# Patient Record
Sex: MALE | Race: WHITE | Employment: FULL TIME | URBAN - METROPOLITAN AREA
[De-identification: names, ages, dates, MRNs, and addresses within clinical notes are randomized per-mention and may not be internally consistent; named-entity substitution may affect disease eponyms.]

---

## 2017-01-04 ENCOUNTER — TELEPHONE (OUTPATIENT)
Dept: INTERNAL MEDICINE CLINIC | Age: 47
End: 2017-01-04

## 2017-01-04 ENCOUNTER — OFFICE VISIT (OUTPATIENT)
Dept: INTERNAL MEDICINE CLINIC | Age: 47
End: 2017-01-04

## 2017-01-04 VITALS
DIASTOLIC BLOOD PRESSURE: 88 MMHG | HEART RATE: 84 BPM | WEIGHT: 259 LBS | SYSTOLIC BLOOD PRESSURE: 138 MMHG | TEMPERATURE: 98.2 F | BODY MASS INDEX: 35.13 KG/M2

## 2017-01-04 DIAGNOSIS — J06.9 UPPER RESPIRATORY TRACT INFECTION, UNSPECIFIED TYPE: Primary | ICD-10-CM

## 2017-01-04 PROCEDURE — 99213 OFFICE O/P EST LOW 20 MIN: CPT | Performed by: NURSE PRACTITIONER

## 2017-01-04 RX ORDER — AZITHROMYCIN 250 MG/1
TABLET, FILM COATED ORAL
Qty: 6 TABLET | Refills: 0 | Status: SHIPPED | OUTPATIENT
Start: 2017-01-04 | End: 2017-01-10 | Stop reason: ALTCHOICE

## 2017-01-04 RX ORDER — BENZONATATE 100 MG/1
200 CAPSULE ORAL 3 TIMES DAILY PRN
Qty: 30 CAPSULE | Refills: 0 | Status: SHIPPED | OUTPATIENT
Start: 2017-01-04 | End: 2017-01-10 | Stop reason: ALTCHOICE

## 2017-01-04 RX ORDER — CODEINE PHOSPHATE AND GUAIFENESIN 10; 100 MG/5ML; MG/5ML
5 SOLUTION ORAL NIGHTLY PRN
Qty: 420 ML | Refills: 0 | Status: SHIPPED | OUTPATIENT
Start: 2017-01-04 | End: 2017-01-10 | Stop reason: ALTCHOICE

## 2017-01-10 ENCOUNTER — OFFICE VISIT (OUTPATIENT)
Dept: INTERNAL MEDICINE CLINIC | Age: 47
End: 2017-01-10

## 2017-01-10 VITALS
SYSTOLIC BLOOD PRESSURE: 120 MMHG | WEIGHT: 259 LBS | HEIGHT: 72 IN | DIASTOLIC BLOOD PRESSURE: 80 MMHG | BODY MASS INDEX: 35.08 KG/M2 | HEART RATE: 80 BPM | RESPIRATION RATE: 16 BRPM

## 2017-01-10 DIAGNOSIS — E11.9 TYPE 2 DIABETES MELLITUS WITHOUT COMPLICATION, WITHOUT LONG-TERM CURRENT USE OF INSULIN (HCC): Primary | ICD-10-CM

## 2017-01-10 DIAGNOSIS — N40.1 BENIGN NON-NODULAR PROSTATIC HYPERPLASIA WITH LOWER URINARY TRACT SYMPTOMS: ICD-10-CM

## 2017-01-10 DIAGNOSIS — M1A.0720 CHRONIC IDIOPATHIC GOUT INVOLVING TOE OF LEFT FOOT WITHOUT TOPHUS: ICD-10-CM

## 2017-01-10 DIAGNOSIS — I10 BENIGN ESSENTIAL HTN: ICD-10-CM

## 2017-01-10 DIAGNOSIS — E78.5 DYSLIPIDEMIA: ICD-10-CM

## 2017-01-10 DIAGNOSIS — N52.9 ERECTILE DYSFUNCTION, UNSPECIFIED ERECTILE DYSFUNCTION TYPE: ICD-10-CM

## 2017-01-10 LAB
A/G RATIO: 1.4 (ref 1.1–2.2)
ALBUMIN SERPL-MCNC: 4.6 G/DL (ref 3.4–5)
ALP BLD-CCNC: 70 U/L (ref 40–129)
ALT SERPL-CCNC: 55 U/L (ref 10–40)
ANION GAP SERPL CALCULATED.3IONS-SCNC: 19 MMOL/L (ref 3–16)
AST SERPL-CCNC: 43 U/L (ref 15–37)
BILIRUB SERPL-MCNC: 0.4 MG/DL (ref 0–1)
BUN BLDV-MCNC: 13 MG/DL (ref 7–20)
CALCIUM SERPL-MCNC: 10.2 MG/DL (ref 8.3–10.6)
CHLORIDE BLD-SCNC: 96 MMOL/L (ref 99–110)
CHOLESTEROL, TOTAL: 119 MG/DL (ref 0–199)
CO2: 25 MMOL/L (ref 21–32)
CREAT SERPL-MCNC: 0.8 MG/DL (ref 0.9–1.3)
GFR AFRICAN AMERICAN: >60
GFR NON-AFRICAN AMERICAN: >60
GLOBULIN: 3.4 G/DL
GLUCOSE BLD-MCNC: 176 MG/DL (ref 70–99)
HDLC SERPL-MCNC: 27 MG/DL (ref 40–60)
LDL CHOLESTEROL CALCULATED: 54 MG/DL
POTASSIUM SERPL-SCNC: 4.7 MMOL/L (ref 3.5–5.1)
SODIUM BLD-SCNC: 140 MMOL/L (ref 136–145)
TOTAL PROTEIN: 8 G/DL (ref 6.4–8.2)
TRIGL SERPL-MCNC: 191 MG/DL (ref 0–150)
TSH SERPL DL<=0.05 MIU/L-ACNC: 2.38 UIU/ML (ref 0.27–4.2)
URIC ACID, SERUM: 6.5 MG/DL (ref 3.5–7.2)
VLDLC SERPL CALC-MCNC: 38 MG/DL

## 2017-01-10 PROCEDURE — 99214 OFFICE O/P EST MOD 30 MIN: CPT | Performed by: INTERNAL MEDICINE

## 2017-01-10 RX ORDER — TAMSULOSIN HYDROCHLORIDE 0.4 MG/1
0.4 CAPSULE ORAL DAILY
Qty: 30 CAPSULE | Refills: 3 | Status: SHIPPED | OUTPATIENT
Start: 2017-01-10 | End: 2017-05-30 | Stop reason: ALTCHOICE

## 2017-01-10 RX ORDER — SILDENAFIL 100 MG/1
50 TABLET, FILM COATED ORAL PRN
Qty: 4 TABLET | Refills: 0 | COMMUNITY
Start: 2017-01-10 | End: 2018-06-13 | Stop reason: ALTCHOICE

## 2017-01-11 LAB
ESTIMATED AVERAGE GLUCOSE: 154.2 MG/DL
HBA1C MFR BLD: 7 %

## 2017-03-13 RX ORDER — LISINOPRIL 5 MG/1
TABLET ORAL
Qty: 90 TABLET | Refills: 3 | Status: SHIPPED | OUTPATIENT
Start: 2017-03-13 | End: 2017-04-04 | Stop reason: SDUPTHER

## 2017-03-14 ENCOUNTER — OFFICE VISIT (OUTPATIENT)
Dept: ORTHOPEDIC SURGERY | Age: 47
End: 2017-03-14

## 2017-03-14 VITALS — BODY MASS INDEX: 35.09 KG/M2 | WEIGHT: 259.04 LBS | HEIGHT: 72 IN

## 2017-03-14 DIAGNOSIS — M25.562 CHRONIC PAIN OF BOTH KNEES: ICD-10-CM

## 2017-03-14 DIAGNOSIS — M22.42 CHONDROMALACIA OF BOTH PATELLAE: Primary | ICD-10-CM

## 2017-03-14 DIAGNOSIS — M25.561 CHRONIC PAIN OF BOTH KNEES: ICD-10-CM

## 2017-03-14 DIAGNOSIS — G89.29 CHRONIC PAIN OF BOTH KNEES: ICD-10-CM

## 2017-03-14 DIAGNOSIS — M22.41 CHONDROMALACIA OF BOTH PATELLAE: Primary | ICD-10-CM

## 2017-03-14 DIAGNOSIS — M17.10 LOCALIZED OSTEOARTHROSIS, LOWER LEG: ICD-10-CM

## 2017-03-14 PROCEDURE — 99214 OFFICE O/P EST MOD 30 MIN: CPT | Performed by: FAMILY MEDICINE

## 2017-03-14 PROCEDURE — 73564 X-RAY EXAM KNEE 4 OR MORE: CPT | Performed by: FAMILY MEDICINE

## 2017-03-20 ENCOUNTER — TELEPHONE (OUTPATIENT)
Dept: INTERNAL MEDICINE CLINIC | Age: 47
End: 2017-03-20

## 2017-03-21 ENCOUNTER — OFFICE VISIT (OUTPATIENT)
Dept: ORTHOPEDIC SURGERY | Age: 47
End: 2017-03-21

## 2017-03-21 VITALS — BODY MASS INDEX: 35.09 KG/M2 | HEIGHT: 72 IN | WEIGHT: 259.04 LBS

## 2017-03-21 DIAGNOSIS — M22.42 CHONDROMALACIA OF BOTH PATELLAE: Primary | ICD-10-CM

## 2017-03-21 DIAGNOSIS — M25.561 CHRONIC PAIN OF BOTH KNEES: ICD-10-CM

## 2017-03-21 DIAGNOSIS — G89.29 CHRONIC PAIN OF BOTH KNEES: ICD-10-CM

## 2017-03-21 DIAGNOSIS — M17.10 LOCALIZED OSTEOARTHROSIS, LOWER LEG: ICD-10-CM

## 2017-03-21 DIAGNOSIS — M25.562 CHRONIC PAIN OF BOTH KNEES: ICD-10-CM

## 2017-03-21 DIAGNOSIS — M22.41 CHONDROMALACIA OF BOTH PATELLAE: Primary | ICD-10-CM

## 2017-03-21 PROCEDURE — 99213 OFFICE O/P EST LOW 20 MIN: CPT | Performed by: FAMILY MEDICINE

## 2017-03-21 PROCEDURE — 20610 DRAIN/INJ JOINT/BURSA W/O US: CPT | Performed by: FAMILY MEDICINE

## 2017-03-21 RX ORDER — ALLOPURINOL 100 MG/1
TABLET ORAL
Qty: 180 TABLET | Refills: 2 | Status: SHIPPED | OUTPATIENT
Start: 2017-03-21 | End: 2018-05-21 | Stop reason: SDUPTHER

## 2017-03-28 ENCOUNTER — OFFICE VISIT (OUTPATIENT)
Dept: ORTHOPEDIC SURGERY | Age: 47
End: 2017-03-28

## 2017-03-28 VITALS — BODY MASS INDEX: 35.09 KG/M2 | HEIGHT: 72 IN | WEIGHT: 259.04 LBS

## 2017-03-28 DIAGNOSIS — G89.29 CHRONIC PAIN OF BOTH KNEES: Primary | ICD-10-CM

## 2017-03-28 DIAGNOSIS — M25.562 CHRONIC PAIN OF BOTH KNEES: Primary | ICD-10-CM

## 2017-03-28 DIAGNOSIS — M25.561 CHRONIC PAIN OF BOTH KNEES: Primary | ICD-10-CM

## 2017-03-28 DIAGNOSIS — M22.42 CHONDROMALACIA OF BOTH PATELLAE: ICD-10-CM

## 2017-03-28 DIAGNOSIS — M22.41 CHONDROMALACIA OF BOTH PATELLAE: ICD-10-CM

## 2017-03-28 PROCEDURE — 20610 DRAIN/INJ JOINT/BURSA W/O US: CPT | Performed by: FAMILY MEDICINE

## 2017-03-28 PROCEDURE — 99213 OFFICE O/P EST LOW 20 MIN: CPT | Performed by: FAMILY MEDICINE

## 2017-04-03 ENCOUNTER — TELEPHONE (OUTPATIENT)
Dept: INTERNAL MEDICINE CLINIC | Age: 47
End: 2017-04-03

## 2017-04-04 ENCOUNTER — HOSPITAL ENCOUNTER (OUTPATIENT)
Dept: OTHER | Age: 47
Discharge: HOME | End: 2017-04-04
Attending: INTERNAL MEDICINE | Admitting: INTERNAL MEDICINE

## 2017-04-04 ENCOUNTER — OFFICE VISIT (OUTPATIENT)
Dept: INTERNAL MEDICINE CLINIC | Age: 47
End: 2017-04-04

## 2017-04-04 VITALS
DIASTOLIC BLOOD PRESSURE: 84 MMHG | WEIGHT: 250 LBS | SYSTOLIC BLOOD PRESSURE: 120 MMHG | BODY MASS INDEX: 33.86 KG/M2 | RESPIRATION RATE: 16 BRPM | TEMPERATURE: 98.3 F

## 2017-04-04 DIAGNOSIS — J40 BRONCHITIS: ICD-10-CM

## 2017-04-04 DIAGNOSIS — J40 BRONCHITIS: Primary | ICD-10-CM

## 2017-04-04 PROCEDURE — 99213 OFFICE O/P EST LOW 20 MIN: CPT | Performed by: INTERNAL MEDICINE

## 2017-04-04 RX ORDER — GUAIFENESIN AND DEXTROMETHORPHAN HYDROBROMIDE 600; 30 MG/1; MG/1
1 TABLET, EXTENDED RELEASE ORAL 2 TIMES DAILY
Qty: 28 TABLET | Refills: 0 | COMMUNITY
Start: 2017-04-04 | End: 2017-05-30 | Stop reason: ALTCHOICE

## 2017-04-04 RX ORDER — PROMETHAZINE HYDROCHLORIDE AND CODEINE PHOSPHATE 6.25; 1 MG/5ML; MG/5ML
5 SYRUP ORAL 4 TIMES DAILY PRN
Qty: 120 ML | Refills: 0 | Status: SHIPPED | OUTPATIENT
Start: 2017-04-04 | End: 2017-04-10

## 2017-04-04 RX ORDER — ALBUTEROL SULFATE 90 UG/1
2 AEROSOL, METERED RESPIRATORY (INHALATION) EVERY 6 HOURS PRN
Qty: 1 INHALER | Refills: 3 | Status: SHIPPED | OUTPATIENT
Start: 2017-04-04 | End: 2018-06-13 | Stop reason: ALTCHOICE

## 2017-04-04 RX ORDER — LEVOFLOXACIN 500 MG/1
500 TABLET, FILM COATED ORAL DAILY
Qty: 7 TABLET | Refills: 0 | Status: SHIPPED | OUTPATIENT
Start: 2017-04-04 | End: 2017-04-11

## 2017-04-10 ENCOUNTER — TELEPHONE (OUTPATIENT)
Dept: INTERNAL MEDICINE CLINIC | Age: 47
End: 2017-04-10

## 2017-04-11 ENCOUNTER — OFFICE VISIT (OUTPATIENT)
Dept: ORTHOPEDIC SURGERY | Age: 47
End: 2017-04-11

## 2017-04-11 VITALS — WEIGHT: 250 LBS | BODY MASS INDEX: 33.86 KG/M2 | HEIGHT: 72 IN

## 2017-04-11 DIAGNOSIS — M22.41 CHONDROMALACIA OF BOTH PATELLAE: Primary | ICD-10-CM

## 2017-04-11 DIAGNOSIS — M22.42 CHONDROMALACIA OF BOTH PATELLAE: Primary | ICD-10-CM

## 2017-04-11 PROCEDURE — 20610 DRAIN/INJ JOINT/BURSA W/O US: CPT | Performed by: FAMILY MEDICINE

## 2017-04-18 ENCOUNTER — OFFICE VISIT (OUTPATIENT)
Dept: ORTHOPEDIC SURGERY | Age: 47
End: 2017-04-18

## 2017-04-18 VITALS — HEIGHT: 72 IN | WEIGHT: 250 LBS | BODY MASS INDEX: 33.86 KG/M2

## 2017-04-18 DIAGNOSIS — M22.41 CHONDROMALACIA OF BOTH PATELLAE: Primary | ICD-10-CM

## 2017-04-18 DIAGNOSIS — M25.561 CHRONIC PAIN OF BOTH KNEES: ICD-10-CM

## 2017-04-18 DIAGNOSIS — M22.42 CHONDROMALACIA OF BOTH PATELLAE: Primary | ICD-10-CM

## 2017-04-18 DIAGNOSIS — G89.29 CHRONIC PAIN OF BOTH KNEES: ICD-10-CM

## 2017-04-18 DIAGNOSIS — M25.562 CHRONIC PAIN OF BOTH KNEES: ICD-10-CM

## 2017-04-18 PROCEDURE — 20610 DRAIN/INJ JOINT/BURSA W/O US: CPT | Performed by: FAMILY MEDICINE

## 2017-04-19 ENCOUNTER — TELEPHONE (OUTPATIENT)
Dept: ORTHOPEDIC SURGERY | Age: 47
End: 2017-04-19

## 2017-05-30 ENCOUNTER — OFFICE VISIT (OUTPATIENT)
Dept: INTERNAL MEDICINE CLINIC | Age: 47
End: 2017-05-30

## 2017-05-30 VITALS
DIASTOLIC BLOOD PRESSURE: 83 MMHG | SYSTOLIC BLOOD PRESSURE: 144 MMHG | OXYGEN SATURATION: 97 % | BODY MASS INDEX: 33.86 KG/M2 | HEART RATE: 77 BPM | WEIGHT: 250 LBS

## 2017-05-30 DIAGNOSIS — R11.2 NON-INTRACTABLE VOMITING WITH NAUSEA, UNSPECIFIED VOMITING TYPE: ICD-10-CM

## 2017-05-30 DIAGNOSIS — R19.7 DIARRHEA, UNSPECIFIED TYPE: ICD-10-CM

## 2017-05-30 DIAGNOSIS — R10.10 UPPER ABDOMINAL PAIN: Primary | ICD-10-CM

## 2017-05-30 DIAGNOSIS — R50.9 ELEVATED TEMPERATURE: ICD-10-CM

## 2017-05-30 LAB
A/G RATIO: 1.2 (ref 1.1–2.2)
ALBUMIN SERPL-MCNC: 4.3 G/DL (ref 3.4–5)
ALP BLD-CCNC: 57 U/L (ref 40–129)
ALT SERPL-CCNC: 31 U/L (ref 10–40)
AMYLASE: 42 U/L (ref 25–115)
ANION GAP SERPL CALCULATED.3IONS-SCNC: 19 MMOL/L (ref 3–16)
AST SERPL-CCNC: 22 U/L (ref 15–37)
BASOPHILS ABSOLUTE: 0.1 K/UL (ref 0–0.2)
BASOPHILS RELATIVE PERCENT: 0.4 %
BILIRUB SERPL-MCNC: 0.6 MG/DL (ref 0–1)
BUN BLDV-MCNC: 18 MG/DL (ref 7–20)
CALCIUM SERPL-MCNC: 9.3 MG/DL (ref 8.3–10.6)
CHLORIDE BLD-SCNC: 98 MMOL/L (ref 99–110)
CO2: 24 MMOL/L (ref 21–32)
CREAT SERPL-MCNC: 0.9 MG/DL (ref 0.9–1.3)
EOSINOPHILS ABSOLUTE: 0.8 K/UL (ref 0–0.6)
EOSINOPHILS RELATIVE PERCENT: 4.2 %
GFR AFRICAN AMERICAN: >60
GFR NON-AFRICAN AMERICAN: >60
GLOBULIN: 3.5 G/DL
GLUCOSE BLD-MCNC: 106 MG/DL (ref 70–99)
HCT VFR BLD CALC: 51.3 % (ref 40.5–52.5)
HEMOGLOBIN: 16.7 G/DL (ref 13.5–17.5)
LIPASE: 18 U/L (ref 13–60)
LYMPHOCYTES ABSOLUTE: 4.3 K/UL (ref 1–5.1)
LYMPHOCYTES RELATIVE PERCENT: 23.8 %
MCH RBC QN AUTO: 30.7 PG (ref 26–34)
MCHC RBC AUTO-ENTMCNC: 32.6 G/DL (ref 31–36)
MCV RBC AUTO: 94.2 FL (ref 80–100)
MONOCYTES ABSOLUTE: 0.8 K/UL (ref 0–1.3)
MONOCYTES RELATIVE PERCENT: 4.2 %
NEUTROPHILS ABSOLUTE: 12.2 K/UL (ref 1.7–7.7)
NEUTROPHILS RELATIVE PERCENT: 67.4 %
PDW BLD-RTO: 13.9 % (ref 12.4–15.4)
PLATELET # BLD: 308 K/UL (ref 135–450)
PMV BLD AUTO: 10 FL (ref 5–10.5)
POTASSIUM SERPL-SCNC: 4.7 MMOL/L (ref 3.5–5.1)
RBC # BLD: 5.45 M/UL (ref 4.2–5.9)
SODIUM BLD-SCNC: 141 MMOL/L (ref 136–145)
TOTAL PROTEIN: 7.8 G/DL (ref 6.4–8.2)
WBC # BLD: 18.1 K/UL (ref 4–11)

## 2017-05-30 PROCEDURE — 99213 OFFICE O/P EST LOW 20 MIN: CPT | Performed by: INTERNAL MEDICINE

## 2017-05-30 RX ORDER — ONDANSETRON 4 MG/1
4 TABLET, FILM COATED ORAL EVERY 6 HOURS PRN
Qty: 30 TABLET | Refills: 0 | Status: SHIPPED | OUTPATIENT
Start: 2017-05-30 | End: 2017-10-20 | Stop reason: ALTCHOICE

## 2017-05-30 RX ORDER — LOPERAMIDE HYDROCHLORIDE 2 MG/1
2 CAPSULE ORAL 4 TIMES DAILY PRN
COMMUNITY
Start: 2017-05-30 | End: 2017-10-20 | Stop reason: ALTCHOICE

## 2017-05-31 DIAGNOSIS — A09 DIARRHEA OF INFECTIOUS ORIGIN: Primary | ICD-10-CM

## 2017-06-01 ENCOUNTER — HOSPITAL ENCOUNTER (OUTPATIENT)
Dept: ULTRASOUND IMAGING | Age: 47
Discharge: HOME | End: 2017-06-01
Attending: INTERNAL MEDICINE | Admitting: INTERNAL MEDICINE

## 2017-06-01 DIAGNOSIS — R10.10 UPPER ABDOMINAL PAIN: ICD-10-CM

## 2017-06-01 DIAGNOSIS — R11.2 NON-INTRACTABLE VOMITING WITH NAUSEA, UNSPECIFIED VOMITING TYPE: ICD-10-CM

## 2017-06-02 ENCOUNTER — TELEPHONE (OUTPATIENT)
Dept: INTERNAL MEDICINE CLINIC | Age: 47
End: 2017-06-02

## 2017-06-02 DIAGNOSIS — R10.10 UPPER ABDOMINAL PAIN: Primary | ICD-10-CM

## 2017-06-02 DIAGNOSIS — R14.0 ABDOMINAL BLOATING: ICD-10-CM

## 2017-06-05 ENCOUNTER — HOSPITAL ENCOUNTER (OUTPATIENT)
Dept: OTHER | Age: 47
Discharge: HOME | End: 2017-06-05
Attending: INTERNAL MEDICINE | Admitting: INTERNAL MEDICINE

## 2017-06-05 ENCOUNTER — TELEPHONE (OUTPATIENT)
Dept: INTERNAL MEDICINE CLINIC | Age: 47
End: 2017-06-05

## 2017-06-05 LAB — OCCULT BLOOD SCREENING: NORMAL

## 2017-06-07 ENCOUNTER — TELEPHONE (OUTPATIENT)
Dept: INTERNAL MEDICINE CLINIC | Age: 47
End: 2017-06-07

## 2017-06-19 ENCOUNTER — OFFICE VISIT (OUTPATIENT)
Dept: INTERNAL MEDICINE CLINIC | Age: 47
End: 2017-06-19

## 2017-06-19 VITALS — DIASTOLIC BLOOD PRESSURE: 82 MMHG | SYSTOLIC BLOOD PRESSURE: 130 MMHG

## 2017-06-19 DIAGNOSIS — M54.41 ACUTE RIGHT-SIDED LOW BACK PAIN WITH RIGHT-SIDED SCIATICA: Primary | ICD-10-CM

## 2017-06-19 DIAGNOSIS — R35.0 URINARY FREQUENCY: ICD-10-CM

## 2017-06-19 DIAGNOSIS — N40.1 BENIGN NON-NODULAR PROSTATIC HYPERPLASIA WITH LOWER URINARY TRACT SYMPTOMS: ICD-10-CM

## 2017-06-19 PROCEDURE — 99212 OFFICE O/P EST SF 10 MIN: CPT | Performed by: INTERNAL MEDICINE

## 2017-06-19 RX ORDER — DICYCLOMINE HCL 20 MG
20 TABLET ORAL 3 TIMES DAILY
COMMUNITY
End: 2018-06-13 | Stop reason: ALTCHOICE

## 2017-08-07 RX ORDER — SITAGLIPTIN AND METFORMIN HYDROCHLORIDE 1000; 50 MG/1; MG/1
TABLET, FILM COATED ORAL
Qty: 180 TABLET | Refills: 2 | Status: SHIPPED | OUTPATIENT
Start: 2017-08-07 | End: 2017-09-12 | Stop reason: SDUPTHER

## 2017-09-12 ENCOUNTER — OFFICE VISIT (OUTPATIENT)
Dept: ORTHOPEDIC SURGERY | Age: 47
End: 2017-09-12

## 2017-09-12 VITALS
DIASTOLIC BLOOD PRESSURE: 81 MMHG | SYSTOLIC BLOOD PRESSURE: 130 MMHG | BODY MASS INDEX: 33.86 KG/M2 | HEART RATE: 71 BPM | WEIGHT: 250 LBS | HEIGHT: 72 IN

## 2017-09-12 DIAGNOSIS — M22.42 CHONDROMALACIA OF BOTH PATELLAE: Primary | ICD-10-CM

## 2017-09-12 DIAGNOSIS — M25.562 CHRONIC PAIN OF BOTH KNEES: ICD-10-CM

## 2017-09-12 DIAGNOSIS — M25.561 CHRONIC PAIN OF BOTH KNEES: ICD-10-CM

## 2017-09-12 DIAGNOSIS — G89.29 CHRONIC PAIN OF BOTH KNEES: ICD-10-CM

## 2017-09-12 DIAGNOSIS — S83.282A ACUTE LATERAL MENISCUS TEAR OF LEFT KNEE, INITIAL ENCOUNTER: ICD-10-CM

## 2017-09-12 DIAGNOSIS — M22.41 CHONDROMALACIA OF BOTH PATELLAE: Primary | ICD-10-CM

## 2017-09-12 PROCEDURE — 99213 OFFICE O/P EST LOW 20 MIN: CPT | Performed by: FAMILY MEDICINE

## 2017-09-12 PROCEDURE — 20610 DRAIN/INJ JOINT/BURSA W/O US: CPT | Performed by: FAMILY MEDICINE

## 2017-09-18 RX ORDER — PRAVASTATIN SODIUM 80 MG/1
TABLET ORAL
Qty: 90 TABLET | Refills: 3 | Status: SHIPPED | OUTPATIENT
Start: 2017-09-18 | End: 2018-10-29 | Stop reason: SDUPTHER

## 2017-09-20 ENCOUNTER — TELEPHONE (OUTPATIENT)
Dept: ORTHOPEDIC SURGERY | Age: 47
End: 2017-09-20

## 2017-10-20 ENCOUNTER — OFFICE VISIT (OUTPATIENT)
Dept: INTERNAL MEDICINE CLINIC | Age: 47
End: 2017-10-20

## 2017-10-20 VITALS
SYSTOLIC BLOOD PRESSURE: 126 MMHG | DIASTOLIC BLOOD PRESSURE: 73 MMHG | WEIGHT: 255 LBS | RESPIRATION RATE: 12 BRPM | BODY MASS INDEX: 34.54 KG/M2 | HEART RATE: 85 BPM | HEIGHT: 72 IN

## 2017-10-20 DIAGNOSIS — E78.5 DYSLIPIDEMIA: ICD-10-CM

## 2017-10-20 DIAGNOSIS — E11.9 TYPE 2 DIABETES MELLITUS WITHOUT COMPLICATION, WITHOUT LONG-TERM CURRENT USE OF INSULIN (HCC): Primary | ICD-10-CM

## 2017-10-20 DIAGNOSIS — I10 BENIGN ESSENTIAL HTN: ICD-10-CM

## 2017-10-20 DIAGNOSIS — M1A.0720 CHRONIC IDIOPATHIC GOUT INVOLVING TOE OF LEFT FOOT WITHOUT TOPHUS: ICD-10-CM

## 2017-10-20 DIAGNOSIS — E66.9 OBESITY (BMI 30-39.9): ICD-10-CM

## 2017-10-20 DIAGNOSIS — Z23 NEED FOR INFLUENZA VACCINATION: ICD-10-CM

## 2017-10-20 DIAGNOSIS — K75.81 NASH (NONALCOHOLIC STEATOHEPATITIS): ICD-10-CM

## 2017-10-20 LAB
A/G RATIO: 1.3 (ref 1.1–2.2)
ALBUMIN SERPL-MCNC: 4.5 G/DL (ref 3.4–5)
ALP BLD-CCNC: 73 U/L (ref 40–129)
ALT SERPL-CCNC: 74 U/L (ref 10–40)
ANION GAP SERPL CALCULATED.3IONS-SCNC: 17 MMOL/L (ref 3–16)
AST SERPL-CCNC: 62 U/L (ref 15–37)
BASOPHILS ABSOLUTE: 0.1 K/UL (ref 0–0.2)
BASOPHILS RELATIVE PERCENT: 0.6 %
BILIRUB SERPL-MCNC: 0.5 MG/DL (ref 0–1)
BUN BLDV-MCNC: 13 MG/DL (ref 7–20)
CALCIUM SERPL-MCNC: 9.9 MG/DL (ref 8.3–10.6)
CHLORIDE BLD-SCNC: 96 MMOL/L (ref 99–110)
CHOLESTEROL, TOTAL: 140 MG/DL (ref 0–199)
CO2: 27 MMOL/L (ref 21–32)
CREAT SERPL-MCNC: 0.9 MG/DL (ref 0.9–1.3)
CREATININE URINE: 139.8 MG/DL (ref 39–259)
EOSINOPHILS ABSOLUTE: 0.1 K/UL (ref 0–0.6)
EOSINOPHILS RELATIVE PERCENT: 1 %
GFR AFRICAN AMERICAN: >60
GFR NON-AFRICAN AMERICAN: >60
GLOBULIN: 3.4 G/DL
GLUCOSE BLD-MCNC: 181 MG/DL (ref 70–99)
HCT VFR BLD CALC: 44.6 % (ref 40.5–52.5)
HDLC SERPL-MCNC: 28 MG/DL (ref 40–60)
HEMOGLOBIN: 15.1 G/DL (ref 13.5–17.5)
LDL CHOLESTEROL CALCULATED: 67 MG/DL
LYMPHOCYTES ABSOLUTE: 3.4 K/UL (ref 1–5.1)
LYMPHOCYTES RELATIVE PERCENT: 29.5 %
MCH RBC QN AUTO: 31 PG (ref 26–34)
MCHC RBC AUTO-ENTMCNC: 33.8 G/DL (ref 31–36)
MCV RBC AUTO: 91.8 FL (ref 80–100)
MICROALBUMIN UR-MCNC: <1.2 MG/DL
MICROALBUMIN/CREAT UR-RTO: NORMAL MG/G (ref 0–30)
MONOCYTES ABSOLUTE: 0.6 K/UL (ref 0–1.3)
MONOCYTES RELATIVE PERCENT: 5.3 %
NEUTROPHILS ABSOLUTE: 7.3 K/UL (ref 1.7–7.7)
NEUTROPHILS RELATIVE PERCENT: 63.6 %
PDW BLD-RTO: 13.4 % (ref 12.4–15.4)
PLATELET # BLD: 230 K/UL (ref 135–450)
PMV BLD AUTO: 10.3 FL (ref 5–10.5)
POTASSIUM SERPL-SCNC: 4.4 MMOL/L (ref 3.5–5.1)
RBC # BLD: 4.86 M/UL (ref 4.2–5.9)
SODIUM BLD-SCNC: 140 MMOL/L (ref 136–145)
TOTAL PROTEIN: 7.9 G/DL (ref 6.4–8.2)
TRIGL SERPL-MCNC: 226 MG/DL (ref 0–150)
TSH SERPL DL<=0.05 MIU/L-ACNC: 2.41 UIU/ML (ref 0.27–4.2)
URIC ACID, SERUM: 6 MG/DL (ref 3.5–7.2)
VLDLC SERPL CALC-MCNC: 45 MG/DL
WBC # BLD: 11.5 K/UL (ref 4–11)

## 2017-10-20 PROCEDURE — 90471 IMMUNIZATION ADMIN: CPT | Performed by: INTERNAL MEDICINE

## 2017-10-20 PROCEDURE — 99214 OFFICE O/P EST MOD 30 MIN: CPT | Performed by: INTERNAL MEDICINE

## 2017-10-20 PROCEDURE — 90686 IIV4 VACC NO PRSV 0.5 ML IM: CPT | Performed by: INTERNAL MEDICINE

## 2017-10-20 NOTE — PROGRESS NOTES
[address] Internal Medicine  Patient Encounter  Dr. Stephan Hodgkins      Chief Complaint   Patient presents with    Check-Up    Medication Check    Diabetes         HPI: 50 y.o. male with multiple medical problems seen today for follow up regarding the status of those issues listed below along with a medication review. Patient has not had a checkup since January 2017. He is way overdue for review of these medical problems and lab work. He has since had a colonoscopy and EGD. He has seen Dr. Fide Valdez.  He addressed fatty liver. Pt is to enroll in a study for fatty liver. He never had a liver elastography. A drug for fatty liver is being investigated. He is no longer having N/V/D, abd pain. He has an appointment 11/7. Patient is not sure he wants to participate in the study. He is more concerned about what causes his intermittent nausea, vomiting, diarrhea, abdominal pain. In the absence of that any other etiology autonomic neuropathy comes to mind. Diabetes Mellitus Type II, Follow-up--   Lab Results   Component Value Date    LABA1C 7.0 01/10/2017        Lab Results   Component Value Date    .2 01/10/2017     He has been feeling well. Home sugars have been running--100-120?? He has slacked off checking. He has been focusing on low carb. He has not been exercising outside of work. He is taking the Janumet. The Glipizide was stopped. Last Eye Exam: Overdue. No new vision changes  U. Microalbumin/Cr: 10/10/2016, overdue  + ACEi-- Lisinopril-- No cough  Complications-- None. No new dysesthesias in feet. Insulin Treated? No.  + ASA  + Statin  Foot examOverdue    Hyperlipidemia:    Lab Results   Component Value Date    LDLCALC 54 01/10/2017     He has been taking the Pravastatin. He denies new development of myalgias, weakness, or cramps. He has not been adherent to LF diet. HTN-- He denies new concerns with regards to his BP. He denies cough from ACEi.   He denies lightheadedness, Urine Ratio  - Comprehensive Metabolic Panel  - Hemoglobin A1C  - Lipid Panel  - CBC Auto Differential  - TSH without Reflex    2. Benign essential HTN  Condition is well controlled  Continue current medication  Continue no added salt diet and exercise. Continue to observe for hypertensive symptoms  - Comprehensive Metabolic Panel  - Lipid Panel  - CBC Auto Differential  - TSH without Reflex    3. Chronic idiopathic gout involving toe of left foot without tophus  Condition is well controlled  Continue purine-restricted diet. Patient counseled  - Uric Acid    4. Dyslipidemia  Condition stability is uncertain. Due for lab  Continue pravastatin 80 mg daily  Continue low-fat diet as well  - Comprehensive Metabolic Panel  - Lipid Panel    5. KOEHLER (nonalcoholic steatohepatitis)  Condition stability is uncertain. He's not had a liver biopsy in a couple of years. Patient still needs to have his liver elastography. He is contemplating entering a study for fatty liver  Lifestyle modification discussed. Patient was counseled on how critically important this aspect of his care is  - Comprehensive Metabolic Panel    6. Obesity (BMI 30-39. 9)  As above    7.  Need for influenza vaccination    - INFLUENZA, QUADV, 3 YRS AND OLDER, IM, PF, PREFILL SYR OR SDV, 0.5ML (FLUZONE QUADV, PF)

## 2017-10-20 NOTE — PATIENT INSTRUCTIONS
Continue aggressive lifestyle modification with low carb, low-fat, portion control diet along with increasing cardiovascular exercise. Another option for weight loss assistance would be referral to the [id number] [place] [address] [facility] to discuss the possibility of medical weight loss versus bariatric surgery. Please schedule your diabetic retinal eye exam      Patient Education        Learning About Diabetes Food Guidelines  Your Care Instructions  Meal planning is important to manage diabetes. It helps keep your blood sugar at a target level (which you set with your doctor). You don't have to eat special foods. You can eat what your family eats, including sweets once in a while. But you do have to pay attention to how often you eat and how much you eat of certain foods. You may want to work with a dietitian or a certified diabetes educator (CDE) to help you plan meals and snacks. A dietitian or CDE can also help you lose weight if that is one of your goals. What should you know about eating carbs? Managing the amount of carbohydrate (carbs) you eat is an important part of healthy meals when you have diabetes. Carbohydrate is found in many foods. · Learn which foods have carbs. And learn the amounts of carbs in different foods. ¨ Bread, cereal, pasta, and rice have about 15 grams of carbs in a serving. A serving is 1 slice of bread (1 ounce), ½ cup of cooked cereal, or 1/3 cup of cooked pasta or rice. ¨ Fruits have 15 grams of carbs in a serving. A serving is 1 small fresh fruit, such as an apple or orange; ½ of a banana; ½ cup of cooked or canned fruit; ½ cup of fruit juice; 1 cup of melon or raspberries; or 2 tablespoons of dried fruit. ¨ Milk and no-sugar-added yogurt have 15 grams of carbs in a serving. A serving is 1 cup of milk or 2/3 cup of no-sugar-added yogurt. ¨ Starchy vegetables have 15 grams of carbs in a serving.  A serving is ½ cup of mashed potatoes or sweet potato; 1 cup winter squash; ½ exercise. This will help lower your blood sugar. What else should you know? · Limit saturated fat, such as the fat from meat and dairy products. This is a healthy choice because people who have diabetes are at higher risk of heart disease. So choose lean cuts of meat and nonfat or low-fat dairy products. Use olive or canola oil instead of butter or shortening when cooking. · Don't skip meals. Your blood sugar may drop too low if you skip meals and take insulin or certain medicines for diabetes. · Check with your doctor before you drink alcohol. Alcohol can cause your blood sugar to drop too low. Alcohol can also cause a bad reaction if you take certain diabetes medicines. Follow-up care is a key part of your treatment and safety. Be sure to make and go to all appointments, and call your doctor if you are having problems. It's also a good idea to know your test results and keep a list of the medicines you take. Where can you learn more? Go to https://[employer].VisionGate. org and sign in to your Boom.fm account. Enter F830 in the GateGuru box to learn more about \"Learning About Diabetes Food Guidelines. \"     If you do not have an account, please click on the \"Sign Up Now\" link. Current as of: March 13, 2017  Content Version: 11.3  © 5385-4151 [employer]. Care instructions adapted under license by [facility] ([facility]). If you have questions about a medical condition or this instruction, always ask your healthcare professional. [place] [address] any warranty or liability for your use of this information. Patient Education        Starting a Weight Loss Plan: Care Instructions  Your Care Instructions  If you are thinking about losing weight, it can be hard to know where to start. Your doctor can help you set up a weight loss plan that best meets your needs. You may want to take a class on nutrition or exercise, or join a weight loss support group.  If you have support group for people who are trying to lose weight. Your doctor can suggest groups in your area. Where can you learn more? Go to https://chpepicewsultana.Sanera. org and sign in to your [employer] account. Enter F965 in the [facility] box to learn more about \"Starting a Weight Loss Plan: Care Instructions. \"     If you do not have an account, please click on the \"Sign Up Now\" link. Current as of: October 13, 2016  Content Version: 11.3  © 3270-1384 [employer]. Care instructions adapted under license by [facility] ([facility]). If you have questions about a medical condition or this instruction, always ask your healthcare professional. [address] any warranty or liability for your use of this information. Patient Education        Nonalcoholic Steatohepatitis (KOEHLER): Care Instructions  Your Care Instructions    Nonalcoholic steatohepatitis (KOEHLER) is liver inflammation. It is caused by a buildup of fat in the liver. The fat buildup is not caused by drinking alcohol. Because of the inflammation, the liver does not work as well as it should. KOEHLER is part of a group of liver diseases called nonalcoholic fatty liver disease. In these diseases, fat builds up in the liver and sometimes causes liver damage. This damage can get worse over time. Follow-up care is a key part of your treatment and safety. Be sure to make and go to all appointments, and call your doctor if you are having problems. It's also a good idea to know your test results and keep a list of the medicines you take. How can you care for yourself at home? · Stay at a healthy weight. Or if you need to, slowly get to a healthy weight. · Control your cholesterol. Talk to your doctor about ways to lower your cholesterol, if needed. You might try getting active, taking medicines, and making healthy changes to your diet. · Eat healthy foods.  This includes fruits, vegetables, lean meats and dairy, and whole

## 2017-10-21 LAB
ESTIMATED AVERAGE GLUCOSE: 211.6 MG/DL
HBA1C MFR BLD: 9 %

## 2017-10-23 ENCOUNTER — TELEPHONE (OUTPATIENT)
Dept: ORTHOPEDIC SURGERY | Age: 47
End: 2017-10-23

## 2017-10-24 ENCOUNTER — OFFICE VISIT (OUTPATIENT)
Dept: ORTHOPEDIC SURGERY | Age: 47
End: 2017-10-24

## 2017-10-24 ENCOUNTER — TELEPHONE (OUTPATIENT)
Dept: INTERNAL MEDICINE CLINIC | Age: 47
End: 2017-10-24

## 2017-10-24 VITALS — WEIGHT: 255.07 LBS | BODY MASS INDEX: 34.55 KG/M2 | HEIGHT: 72 IN

## 2017-10-24 DIAGNOSIS — M25.561 CHRONIC PAIN OF BOTH KNEES: ICD-10-CM

## 2017-10-24 DIAGNOSIS — G89.29 CHRONIC PAIN OF BOTH KNEES: ICD-10-CM

## 2017-10-24 DIAGNOSIS — M22.42 CHONDROMALACIA OF BOTH PATELLAE: Primary | ICD-10-CM

## 2017-10-24 DIAGNOSIS — M25.562 CHRONIC PAIN OF BOTH KNEES: ICD-10-CM

## 2017-10-24 DIAGNOSIS — M22.41 CHONDROMALACIA OF BOTH PATELLAE: Primary | ICD-10-CM

## 2017-10-24 DIAGNOSIS — M17.10 LOCALIZED OSTEOARTHROSIS, LOWER LEG: ICD-10-CM

## 2017-10-24 PROCEDURE — 99213 OFFICE O/P EST LOW 20 MIN: CPT | Performed by: FAMILY MEDICINE

## 2017-10-24 PROCEDURE — 20610 DRAIN/INJ JOINT/BURSA W/O US: CPT | Performed by: FAMILY MEDICINE

## 2017-10-24 NOTE — PROGRESS NOTES
Chief Complaint  Knee Pain (EUFLEXXA #1 JUDSON KNEES)    followup recurrent bilateral knee pain with substantial patellofemoral arthropathy with underlying knee osteoarthritis    History of Present Illness:  Laura Del Real is a 50 y.o. male Janet Aguilar is a 56-year-old white male type II diabetic who is a walking  for the [employer]. He is being seen back today for a 10+ month hiatus for persistent recurrence of his bilateral anterior and medial knee pain. He does have known osteoarthritis about the knees with chondromalacia. He has been on his Celebrex but has been attempting to perform his exercise program.  He was doing some increased walking on very hilly routes at work and over the last month has noticed increasing pain about the knees bilaterally. Denies locking catching or interim history of injury. He is requesting a steroid injection but we do not have Workmen's Comp. approval for this as yet. Denies true locking catching or instability. He does periodically wear his knee brace. He was last seen in December 2014 and did have bilateral Euflexxa injections completed which did provide reasonable pain relief. Over the last couple of weeks he had some increasing achiness which she was attributing to there whether. He does have some swelling. He has done well with the Visco supplementation in the past is injected in pursuing this. He denies locking catching or instability symptoms. He does only periodically utilizes knee braces and does perform his home based exercises. He was seen in the office on 7/30/2015 and was continued on conservative treatment for his bilateral knee chondromalacia patella. He has no some increasing achiness over the past 4-5 weeks. Work his normal walking mail route. He is done well with Visco supplementation injections in the past.  He does take his anti-inflammatory medications with Celebrex 200 mg daily.   He does periodically utilizes knee throughout the day. He does take his Celebrex does periodically utilizes increases and performs his home-based exercises. Denies locking catching or instability. Medical History     Patient's medications, allergies, past medical, surgical, social and family histories were reviewed and updated as appropriate. Review of Systems  Relevant review of systems reviewed and available in the patient's chart    Vital Signs  There were no vitals filed for this visit. General Exam:       Constitutional: Patient is adequately groomed with no evidence of malnutrition  DTRs: Deep tendon reflexes are intact  Mental Status: The patient is oriented to time, place and person. The patient's mood and affect are appropriate. Vascular: Examination reveals no swelling or calf tenderness. Peripheral pulses are palpable and 2+. Neurological: The patient has good coordination. There is no weakness or sensory deficit. Knee Examination  Inspection:  He is in slight varus and has trace  effusions bilaterally. There is marked patellofemoral crepitation. No high-grade malalignment. Palpation: He has mild recurrent tenderness to palpation over the medial joint line and also over the retropatellar joint. He does have a mildly positive grind testing. This is certainly more prominent than last week. He is most tender along the anterior 3rd medial joint lines. He rates this at about of 5-6 out of 10. Rang of Motion:  Range of motion -5-120 bilaterally. Strength:  Strength testing 4+ out of 5 knee flexion extension. Special Tests:  He is a mildly positive grind test and pain with medial Helen's. I do not feel high-grade click but certainly this worsens his pain bilaterally. This is remarkable for crepitation than actual pop or click. No evidence of instability. Skin: There are no rashes, ulcerations or lesions. Distal neurovascular exam is intact. Gait: Improved gait.     Reflex reflexes are preserved her    Additional Comments:     Additional Examinations:  Right Lower Extremity: Examination of the right lower extremity does not show any tenderness, deformity or injury. Range of motion is unremarkable. There is no gross instability. There are no rashes, ulcerations or lesions. Strength and tone are normal.  Left Lower Extremity: Examination of the left lower extremity does not show any tenderness, deformity or injury. Range of motion is unremarkable. There is no gross instability. There are no rashes, ulcerations or lesions. Strength and tone are normal.  Lower Back: Examination of the lower back does not show any tenderness, deformity or injury. Range of motion is unremarkable. There is no gross instability. There are no rashes, ulcerations or lesions. Strength and tone are normal.  Examination of the bilateral hip reveals intact skin. The patient demonstrates full painless range of motion with regards to flexion, abduction, internal and external rotation. There is not tenderness about the greater trochanter. There is a negative straight leg raise against resistance. Strength is 5/5 thorough out all planes. Diagnostic Test Findings: Bilateral knee MRIs performed 5/5/2017 as listed above    Right knee MRI 5/5/2017  CONCLUSION:    1. Grade 3-4 chondromalacia of lateral facet of the patella with foci of osteochondral erosion    and chondral fissuring. 2. No meniscal tear or acute ligamentous injury. 3. Mild chronic lateral capsulitis and popliteal tendinopathy. 4. Enthesopathy of superior patellar tendon, no tear. Left knee MRI 5/5/2017  CONCLUSION:    1. Stable grade 3-4 chondromalacia of lateral facet of the patella with focal osteochondral    erosion, chondral thinning and fissuring. Grade 3 chondromalacia of superolateral trochlea with    chondral thinning and fissuring.     2. Stable chronic mild-moderate tendinopathy of superior patellar tendon with a 2cm slit-like

## 2017-10-24 NOTE — TELEPHONE ENCOUNTER
Pt stated that he was seen on Friday and 2 rx were called in for a new med. He stated that he called the pharmacy and they do not have it. Pt did not know the name of the med. He stated that the one med is at the pharmacy and he is coming to  the coupon for that one. Please call pt if any questions.

## 2017-10-31 ENCOUNTER — OFFICE VISIT (OUTPATIENT)
Dept: ORTHOPEDIC SURGERY | Age: 47
End: 2017-10-31

## 2017-10-31 VITALS — HEIGHT: 72 IN | WEIGHT: 255.07 LBS | BODY MASS INDEX: 34.55 KG/M2

## 2017-10-31 DIAGNOSIS — M17.10 LOCALIZED OSTEOARTHROSIS, LOWER LEG: ICD-10-CM

## 2017-10-31 DIAGNOSIS — M22.41 CHONDROMALACIA OF RIGHT PATELLA: Primary | ICD-10-CM

## 2017-10-31 DIAGNOSIS — M22.42 CHONDROMALACIA OF LEFT PATELLA: ICD-10-CM

## 2017-10-31 PROCEDURE — 20610 DRAIN/INJ JOINT/BURSA W/O US: CPT | Performed by: FAMILY MEDICINE

## 2017-10-31 NOTE — PROGRESS NOTES
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 Tena Rivas who is being seen today for reevaluation of his knees bilaterally and to continue with his Visco supplementation series. He is doing reasonably well. He did have some increasing achiness with prolonged walking and going up and down stairs and steps but for the most part is doing reasonably well. He has been performing his home exercise program on a fairly regular basis and does take his chronic Celebrex 200 mg daily. He occasionally utilizes his braces and is here today for his 2nd Euflexxa injection bilaterally. PE: no substantial change in exam.    Assessment:  Knee Osteoarthritis with viscosupplementation      PROCEDURE NOTE:    PRE-PROCEDURE DIAGNOSIS: DJD knee    POST-PROCEDURE DIAGNOSIS: DJD knee    Injection #2 of Euflexxa . PROCEDURE:  With the patient's permission, his bilaterally knee was prepped in standard sterile fashion with Betadine and Alcohol and the prefilled 2cc injection of Euflexxa was injected intra-articularly into the bilaterally knee via a superolateral approach without difficulty. The patient tolerated this well without difficulty. A band-aid was applied. POST-PROCEDURE INSTRUCTIONS GIVEN TO PATIENT: The patient was advised to ice the knee for 15-20 minutes to relieve any injection site related pain. FOLLOW-UP: as directed. He will continue with his Celebrex 200 mg daily and importance of complying with his home exercise program and periodic use of his brace and icing was also discussed. He'll be seen back in one week to complete is Euflexxa series.   He has done well with Visco supplementation in the past.

## 2017-11-07 ENCOUNTER — OFFICE VISIT (OUTPATIENT)
Dept: ORTHOPEDIC SURGERY | Age: 47
End: 2017-11-07

## 2017-11-07 VITALS — HEIGHT: 72 IN | WEIGHT: 255.07 LBS | BODY MASS INDEX: 34.55 KG/M2

## 2017-11-07 DIAGNOSIS — M22.42 CHONDROMALACIA OF LEFT PATELLA: ICD-10-CM

## 2017-11-07 DIAGNOSIS — M25.561 CHRONIC PAIN OF BOTH KNEES: Primary | ICD-10-CM

## 2017-11-07 DIAGNOSIS — M22.41 CHONDROMALACIA OF RIGHT PATELLA: ICD-10-CM

## 2017-11-07 DIAGNOSIS — M25.562 CHRONIC PAIN OF BOTH KNEES: Primary | ICD-10-CM

## 2017-11-07 DIAGNOSIS — G89.29 CHRONIC PAIN OF BOTH KNEES: Primary | ICD-10-CM

## 2017-11-07 PROCEDURE — 20610 DRAIN/INJ JOINT/BURSA W/O US: CPT | Performed by: FAMILY MEDICINE

## 2018-03-01 ENCOUNTER — TELEPHONE (OUTPATIENT)
Dept: ORTHOPEDIC SURGERY | Age: 48
End: 2018-03-01

## 2018-03-13 ENCOUNTER — OFFICE VISIT (OUTPATIENT)
Dept: ORTHOPEDIC SURGERY | Age: 48
End: 2018-03-13

## 2018-03-13 VITALS
HEART RATE: 74 BPM | SYSTOLIC BLOOD PRESSURE: 127 MMHG | WEIGHT: 255.07 LBS | DIASTOLIC BLOOD PRESSURE: 73 MMHG | HEIGHT: 72 IN | BODY MASS INDEX: 34.55 KG/M2

## 2018-03-13 DIAGNOSIS — M22.42 CHONDROMALACIA OF LEFT PATELLA: ICD-10-CM

## 2018-03-13 DIAGNOSIS — G89.29 CHRONIC PAIN OF BOTH KNEES: ICD-10-CM

## 2018-03-13 DIAGNOSIS — M17.10 LOCALIZED OSTEOARTHROSIS, LOWER LEG: ICD-10-CM

## 2018-03-13 DIAGNOSIS — M25.561 CHRONIC PAIN OF BOTH KNEES: ICD-10-CM

## 2018-03-13 DIAGNOSIS — M22.41 CHONDROMALACIA OF RIGHT PATELLA: Primary | ICD-10-CM

## 2018-03-13 DIAGNOSIS — M25.562 CHRONIC PAIN OF BOTH KNEES: ICD-10-CM

## 2018-03-13 PROCEDURE — 99213 OFFICE O/P EST LOW 20 MIN: CPT | Performed by: FAMILY MEDICINE

## 2018-03-13 PROCEDURE — 20610 DRAIN/INJ JOINT/BURSA W/O US: CPT | Performed by: FAMILY MEDICINE

## 2018-03-13 NOTE — PROGRESS NOTES
Injection administration details:  Date & Time: 3/13/18 10:35 AM  Site & Comments:Bilateral Knees administered by Dr Gamaliel Schmidt    Betamethasone (30mg/mL)  # of cc: 2  [facility]:4694-6141-56   Lot number: [id number]  EXP: 07/2019    Bupivacaine 0.25%  # of cc:2  [facility]: 3032-2697-78  Lot number: [id number]  EXP: 08/1/2018    1% Xylocaine(10mg/ mL)  # of cc:1  NDC: 39328-498-25  Lot number: [id number]  EXP: 07/2021
throughout the day. He does take his Celebrex does periodically utilizes increases and performs his home-based exercises. Denies locking catching or instability. He was last seen in the office on 11/7/2017 he completed Euflexxa to his knees bilaterally. Once again does have known bilateral knee grade 3-4 chondromalacia patella with primarily grade 2 medial compartment osteoarthritis. He did reasonably well but over the last few weeks has developed achiness involving his knees bilaterally which she rates at about a 5 out of 10. He has continued with his Celebrex and his home-based exercise program does utilize his knee braces. Catching or instability. He is to be done well with Visco supplementation in the past which was last completed on 11/7/2017. He would like to put off knee surgery as long as possible. Medical History     Patient's medications, allergies, past medical, surgical, social and family histories were reviewed and updated as appropriate. Review of Systems  Relevant review of systems reviewed and available in the patient's chart    Vital Signs  Vitals:    03/13/18 1029   BP: 127/73   Pulse: 74       General Exam:       Constitutional: Patient is adequately groomed with no evidence of malnutrition  DTRs: Deep tendon reflexes are intact  Mental Status: The patient is oriented to time, place and person. The patient's mood and affect are appropriate. Vascular: Examination reveals no swelling or calf tenderness. Peripheral pulses are palpable and 2+. Neurological: The patient has good coordination. There is no weakness or sensory deficit. Knee Examination  Inspection:  He is in slight varus and has trace  effusions bilaterally. There is marked patellofemoral crepitation. No high-grade malalignment. Palpation: He has recurrent tenderness to palpation over the medial joint line and also over the retropatellar joint. He does have a mildly positive grind testing.   This is certainly

## 2018-03-19 RX ORDER — LISINOPRIL 5 MG/1
TABLET ORAL
Qty: 30 TABLET | Refills: 0 | Status: SHIPPED | OUTPATIENT
Start: 2018-03-19 | End: 2018-04-20 | Stop reason: SDUPTHER

## 2018-04-24 RX ORDER — LISINOPRIL 5 MG/1
TABLET ORAL
Qty: 15 TABLET | Refills: 0 | Status: SHIPPED | OUTPATIENT
Start: 2018-04-24 | End: 2018-06-13 | Stop reason: SDUPTHER

## 2018-05-21 ENCOUNTER — TELEPHONE (OUTPATIENT)
Dept: INTERNAL MEDICINE CLINIC | Age: 48
End: 2018-05-21

## 2018-05-22 RX ORDER — ALLOPURINOL 100 MG/1
TABLET ORAL
Qty: 180 TABLET | Refills: 1 | Status: SHIPPED | OUTPATIENT
Start: 2018-05-22 | End: 2019-03-25 | Stop reason: SDUPTHER

## 2018-06-13 ENCOUNTER — OFFICE VISIT (OUTPATIENT)
Dept: INTERNAL MEDICINE CLINIC | Age: 48
End: 2018-06-13

## 2018-06-13 VITALS
BODY MASS INDEX: 32.98 KG/M2 | WEIGHT: 243.2 LBS | DIASTOLIC BLOOD PRESSURE: 80 MMHG | RESPIRATION RATE: 14 BRPM | HEART RATE: 62 BPM | SYSTOLIC BLOOD PRESSURE: 132 MMHG

## 2018-06-13 DIAGNOSIS — R55 VASOVAGAL SYNCOPE: ICD-10-CM

## 2018-06-13 DIAGNOSIS — E66.9 OBESITY (BMI 30-39.9): ICD-10-CM

## 2018-06-13 DIAGNOSIS — E78.5 DYSLIPIDEMIA: ICD-10-CM

## 2018-06-13 DIAGNOSIS — Z91.199 MEDICALLY NONCOMPLIANT: ICD-10-CM

## 2018-06-13 DIAGNOSIS — Z12.5 SCREENING FOR PROSTATE CANCER: ICD-10-CM

## 2018-06-13 DIAGNOSIS — I10 BENIGN ESSENTIAL HTN: ICD-10-CM

## 2018-06-13 PROBLEM — S83.282A ACUTE LATERAL MENISCUS TEAR OF LEFT KNEE: Status: RESOLVED | Noted: 2017-09-12 | Resolved: 2018-06-13

## 2018-06-13 LAB
A/G RATIO: 1.5 (ref 1.1–2.2)
ALBUMIN SERPL-MCNC: 4.6 G/DL (ref 3.4–5)
ALP BLD-CCNC: 58 U/L (ref 40–129)
ALT SERPL-CCNC: 52 U/L (ref 10–40)
ANION GAP SERPL CALCULATED.3IONS-SCNC: 17 MMOL/L (ref 3–16)
AST SERPL-CCNC: 50 U/L (ref 15–37)
BILIRUB SERPL-MCNC: 0.5 MG/DL (ref 0–1)
BUN BLDV-MCNC: 13 MG/DL (ref 7–20)
CALCIUM SERPL-MCNC: 9.9 MG/DL (ref 8.3–10.6)
CHLORIDE BLD-SCNC: 99 MMOL/L (ref 99–110)
CHOLESTEROL, TOTAL: 129 MG/DL (ref 0–199)
CO2: 25 MMOL/L (ref 21–32)
CREAT SERPL-MCNC: 0.7 MG/DL (ref 0.9–1.3)
GFR AFRICAN AMERICAN: >60
GFR NON-AFRICAN AMERICAN: >60
GLOBULIN: 3 G/DL
GLUCOSE BLD-MCNC: 121 MG/DL (ref 70–99)
HBA1C MFR BLD: 6.7 %
HDLC SERPL-MCNC: 32 MG/DL (ref 40–60)
LDL CHOLESTEROL CALCULATED: 66 MG/DL
POTASSIUM SERPL-SCNC: 4.6 MMOL/L (ref 3.5–5.1)
PROSTATE SPECIFIC ANTIGEN: 0.47 NG/ML (ref 0–4)
SODIUM BLD-SCNC: 141 MMOL/L (ref 136–145)
TOTAL PROTEIN: 7.6 G/DL (ref 6.4–8.2)
TRIGL SERPL-MCNC: 154 MG/DL (ref 0–150)
VLDLC SERPL CALC-MCNC: 31 MG/DL

## 2018-06-13 PROCEDURE — 83036 HEMOGLOBIN GLYCOSYLATED A1C: CPT | Performed by: INTERNAL MEDICINE

## 2018-06-13 PROCEDURE — 99214 OFFICE O/P EST MOD 30 MIN: CPT | Performed by: INTERNAL MEDICINE

## 2018-06-13 RX ORDER — LISINOPRIL 5 MG/1
TABLET ORAL
Qty: 90 TABLET | Refills: 3 | Status: SHIPPED | OUTPATIENT
Start: 2018-06-13 | End: 2019-06-27 | Stop reason: SDUPTHER

## 2018-06-13 RX ORDER — CELECOXIB 200 MG/1
200 CAPSULE ORAL DAILY
COMMUNITY
End: 2022-09-21 | Stop reason: ALTCHOICE

## 2018-06-13 ASSESSMENT — PATIENT HEALTH QUESTIONNAIRE - PHQ9
2. FEELING DOWN, DEPRESSED OR HOPELESS: 0
1. LITTLE INTEREST OR PLEASURE IN DOING THINGS: 0
SUM OF ALL RESPONSES TO PHQ QUESTIONS 1-9: 0
SUM OF ALL RESPONSES TO PHQ9 QUESTIONS 1 & 2: 0

## 2018-07-03 ENCOUNTER — TELEPHONE (OUTPATIENT)
Dept: INTERNAL MEDICINE CLINIC | Age: 48
End: 2018-07-03

## 2018-09-25 ENCOUNTER — OFFICE VISIT (OUTPATIENT)
Dept: ORTHOPEDIC SURGERY | Age: 48
End: 2018-09-25
Payer: OTHER GOVERNMENT

## 2018-09-25 VITALS
HEART RATE: 73 BPM | HEIGHT: 72 IN | DIASTOLIC BLOOD PRESSURE: 75 MMHG | BODY MASS INDEX: 32.51 KG/M2 | WEIGHT: 240 LBS | SYSTOLIC BLOOD PRESSURE: 128 MMHG

## 2018-09-25 DIAGNOSIS — M25.561 CHRONIC PAIN OF BOTH KNEES: ICD-10-CM

## 2018-09-25 DIAGNOSIS — M22.41 CHONDROMALACIA OF RIGHT PATELLA: Primary | ICD-10-CM

## 2018-09-25 DIAGNOSIS — M22.42 CHONDROMALACIA OF LEFT PATELLA: ICD-10-CM

## 2018-09-25 DIAGNOSIS — G89.29 CHRONIC PAIN OF BOTH KNEES: ICD-10-CM

## 2018-09-25 DIAGNOSIS — M25.562 CHRONIC PAIN OF BOTH KNEES: ICD-10-CM

## 2018-09-25 PROCEDURE — 99213 OFFICE O/P EST LOW 20 MIN: CPT | Performed by: FAMILY MEDICINE

## 2018-09-25 NOTE — PROGRESS NOTES
Chief Complaint  Knee Pain ([facility] JUDSON KNEES. FINISHED JUDSON KNEES EUFLEXXA 11/7/17, CORTISONE JUDSON KNEES 3/13/18)    followup recurrent bilateral knee pain with substantial patellofemoral arthropathy with underlying knee osteoarthritis    History of Present Illness:  Derek Harrison is a 46 y.o. male Jay Garcia is a 24-year-old white male type II diabetic who is a walking  for the [employer]. He is being seen back today for a 10+ month hiatus for persistent recurrence of his bilateral anterior and medial knee pain. He does have known osteoarthritis about the knees with chondromalacia. He has been on his Celebrex but has been attempting to perform his exercise program.  He was doing some increased walking on very hilly routes at work and over the last month has noticed increasing pain about the knees bilaterally. Denies locking catching or interim history of injury. He is requesting a steroid injection but we do not have Workmen's Comp. approval for this as yet. Denies true locking catching or instability. He does periodically wear his knee brace. He was last seen in December 2014 and did have bilateral Euflexxa injections completed which did provide reasonable pain relief. Over the last couple of weeks he had some increasing achiness which she was attributing to there whether. He does have some swelling. He has done well with the Visco supplementation in the past is injected in pursuing this. He denies locking catching or instability symptoms. He does only periodically utilizes knee braces and does perform his home based exercises. He was seen in the office on 7/30/2015 and was continued on conservative treatment for his bilateral knee chondromalacia patella. He has no some increasing achiness over the past 4-5 weeks. Work his normal walking mail route.   He is done well with Visco supplementation injections in the past.  He does take his anti-inflammatory medications with Celebrex 200 mg daily. He does periodically utilizes knee braces and does perform his home exercises. She would like to consider repeat Visco supplementation to his knees bilaterally as is helped him substantially the past.  His last round of Euflexxa was December 2014. He was last seen in the office on 10/8/2015 when we completed Euflexxa to his knees bilaterally. 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 Denies locking catching or instability. He is done very well with Visco supplementation in the past and has been maintaining his Celebrex 200 mg daily and periodically utilizing his knee brace. Denies locking catching or instability. He would like to resume Visco supplementation bilaterally. She was last seen in the office on 9/6/2016 when we completed Euflexxa to his knees bilaterally. He was doing very well but about a month ago began to notice increasing soreness about the knee particularly anteriorly. There is no interim history of injury or no activity prior to becoming symptomatic. He is not complaining of locking or catching. He does utilize his knee braces and has been performing his home based exercises. He does continue with his Celebrex 200 mg daily. Done recently good relief with physical supplementation in the past and is potentially interested in repeating this. Positional changes and prolonged standing and walking are painful and at times he will have 8 out of 10 pain coming home from work. 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 He was seen in the office on 3/14/2017 for repeat evaluation and consideration of Euflexxa. He is done well with these in the past but Workmen's Comp. wanted us to do a more thorough evaluation and updated x-rays.   He continues to have pain ranging between a 7-8 out of 10 with prolonged standing and walking. He has been working on his home-based exercises and taking his Celebrex. Denies active locking or catching. He does have difficulty with attempted squatting and going up and downstairs as well. He has no substantial rest or night pain currently. 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 He was seen in the office on 3/21/2017 and given his increasing pain did have steroid injections performed to his knees. He presents back today stating that his symptoms have improved. He is having less pain with prolonged standing walking and going up and downstairs. He has been working on his home-based exercises continued with his Celebrex. He is at least 70% improved overall. He is been contemplating repeat Visco supplementation which was last completed on 9/6/2016. He is done well for a number of years with physical supplementation would like to put off potential surgical intervention as long as possible. He was last seen in the office on 4/11/2017 and did receive his 2nd Euflexxa injection. Initially he was doing reasonably well but over the weekend on 4/15/2017 he did have recurrent bilateral anterior knee pain. There is no history of fall or additional activity which she can blame on his worsening knee pain. He did develop an effusion. He was having pseudo-buckling and then had to utilize his brace ice and continue with his medications over the weekend. He has had a couple of these episodes the past but this is the 1st one that is been lingering. He is not really complaining of locking or catching. His previous MRI back in 2012 revealed advanced patellofemoral arthropathy with less weightbearing osteoarthritic change and no meniscus tear. He may be developing some catching bilaterally. He has been icing. 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   He is been primarily driving. She does continue with his Celebrex. His pain ranges between a 3-7 out of 10 with occasional rest pain and occasional night pain. He was last seen in the office on 4/18/2017 and initially his symptoms did improve and he is continue with his Celebrex. He does walk and ride his job as a [employer] . More stairs he does more pain he will have. He is not complaining of substantial pain in night and is sporadically compliant with his home-based exercises. He does use his knee braces. He was having worsening pain in April 2017 and would send him for updated MRIs which he never returned for review. This was done today. His MRI of his right knee was performed on 5/9/2017 and did show a stable appearance to his great 3-4 chondromalacia lateral facet of patella with osteochondral erosion. There is no evidence of ligamentous or meniscal tearing on the right and he did have mild chronic lateral capsulitis. His MRI of his left knee was also obtained on 5/9/2017 and did show stable appearance to his great 3-4 chondromalacia to the lateral patellofemoral facet. He did have slightly more prominent lateral capsulitis with chronic tendinopathy to the popliteal tendon. 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 Denies locking catching or instability. Continue with his Celebrex. He has become a little bit more symptomatic since mid August 2017 and would not be eligible for Visco supplementation until after 10/11/2017. He was last seen in the office on 9/12/2017 was continuing a conservative treatment once again for his knee. He does both walk and ride for his job as a . He is doing much better compared with last month. He has typically got an excellent response to Visco supplementation to his knees bilaterally which was last completed on 4/18/2017.   He does have pain which ranges between 3-7 out of 10 depending on how much walking he does throughout the day. He does take his Celebrex does periodically utilizes increases and performs his home-based exercises. Denies locking catching or instability. He was last seen in the office on 11/7/2017 he completed Euflexxa to his knees bilaterally. Once again does have known bilateral knee grade 3-4 chondromalacia patella with primarily grade 2 medial compartment osteoarthritis. He did reasonably well but over the last few weeks has developed achiness involving his knees bilaterally which she rates at about a 5 out of 10. He has continued with his Celebrex and his home-based exercise program does utilize his knee braces. Catching or instability. He is to be done well with Visco supplementation in the past which was last completed on 11/7/2017. He would like to put off knee surgery as long as possible. He was last seen in the office on 3/13/2018 and was continued on conservative treatment for his bilateral knee grade 3-4 chondromalacia patella with medial compartment grade 2 osteoarthritis. He has noticed increasing pain over the last few weeks as he has been performing primarily is walking around doing frequent stair climbing and going up and down hills. He has been fairly consistent with his home-based exercises use of his knee braces and is chronic Celebrex. He last completed Visco supplementation to his knees on 11/7/2017. Last steroid injection was 3/13/2018. Eyes definitive locking or true instability symptoms. Medical History     Patient's medications, allergies, past medical, surgical, social and family histories were reviewed and updated as appropriate.     Review of Systems  Relevant review of systems reviewed and available in the patient's chart    Vital Signs  Vitals:    09/25/18 1341   BP: 128/75   Pulse: 73       General Exam:       Constitutional: Patient is adequately groomed with no evidence of malnutrition  DTRs: Deep

## 2018-10-16 ENCOUNTER — OFFICE VISIT (OUTPATIENT)
Dept: ORTHOPEDIC SURGERY | Age: 48
End: 2018-10-16
Payer: OTHER GOVERNMENT

## 2018-10-16 VITALS — BODY MASS INDEX: 31.83 KG/M2 | WEIGHT: 235 LBS | HEIGHT: 72 IN

## 2018-10-16 DIAGNOSIS — M22.41 CHONDROMALACIA OF RIGHT PATELLA: ICD-10-CM

## 2018-10-16 DIAGNOSIS — M25.562 CHRONIC PAIN OF BOTH KNEES: ICD-10-CM

## 2018-10-16 DIAGNOSIS — M22.42 CHONDROMALACIA OF LEFT PATELLA: Primary | ICD-10-CM

## 2018-10-16 DIAGNOSIS — M25.561 CHRONIC PAIN OF BOTH KNEES: ICD-10-CM

## 2018-10-16 DIAGNOSIS — G89.29 CHRONIC PAIN OF BOTH KNEES: ICD-10-CM

## 2018-10-16 PROCEDURE — 20610 DRAIN/INJ JOINT/BURSA W/O US: CPT | Performed by: FAMILY MEDICINE

## 2018-10-16 PROCEDURE — 99213 OFFICE O/P EST LOW 20 MIN: CPT | Performed by: FAMILY MEDICINE

## 2018-10-18 ENCOUNTER — OFFICE VISIT (OUTPATIENT)
Dept: ORTHOPEDIC SURGERY | Age: 48
End: 2018-10-18
Payer: OTHER GOVERNMENT

## 2018-10-18 VITALS
DIASTOLIC BLOOD PRESSURE: 77 MMHG | WEIGHT: 235 LBS | HEART RATE: 80 BPM | HEIGHT: 72 IN | BODY MASS INDEX: 31.83 KG/M2 | SYSTOLIC BLOOD PRESSURE: 128 MMHG

## 2018-10-18 DIAGNOSIS — G89.29 CHRONIC PAIN OF BOTH KNEES: ICD-10-CM

## 2018-10-18 DIAGNOSIS — M25.562 CHRONIC PAIN OF BOTH KNEES: ICD-10-CM

## 2018-10-18 DIAGNOSIS — M22.41 CHONDROMALACIA OF RIGHT PATELLA: ICD-10-CM

## 2018-10-18 DIAGNOSIS — M22.42 CHONDROMALACIA OF LEFT PATELLA: Primary | ICD-10-CM

## 2018-10-18 DIAGNOSIS — M22.41 CHONDROMALACIA OF BOTH PATELLAE: ICD-10-CM

## 2018-10-18 DIAGNOSIS — M25.561 CHRONIC PAIN OF BOTH KNEES: ICD-10-CM

## 2018-10-18 DIAGNOSIS — M22.42 CHONDROMALACIA OF BOTH PATELLAE: ICD-10-CM

## 2018-10-18 PROCEDURE — 99213 OFFICE O/P EST LOW 20 MIN: CPT | Performed by: FAMILY MEDICINE

## 2018-10-18 PROCEDURE — 20610 DRAIN/INJ JOINT/BURSA W/O US: CPT | Performed by: FAMILY MEDICINE

## 2018-10-19 NOTE — PROGRESS NOTES
Injection administration details:  Date & Time: 10/18/18 9:25 AM  Site & Comments:Bilateral Knees administered by Dr Martin Stuart    Betamethasone (30mg/mL)  # of cc: 2  [facility]:9803-6807-35   Lot number: [id number]  EXP: 03/2020    Bupivacaine 0.25%  # of cc:2  [facility]: 0031-7738-93  Lot number: [id number]  EXP: 02/1/2019    2% Lidocaine (20mg/ mL)  # of cc:1  [facility]: 617-8468-49  Lot number: [id number]  EXP: 04/2020
Procedures  LABOR EPIDURAL BLOCK NOTE    Antonina Palomares  [id number]    Procedure date: 6/25/2018    Location: Labor and Delivery    Preanesthetic Checklist: Patient identified, risk and benefits and other alternatives discussed with pt and/or representative. Questions answered and agreement obtained. Monitors and equipment checked, IV - Crytalloid infusing, Patient and fetus hemodynamically stable. Timeout performed. Patient reevaluated immediately prior to block and is ready for procedure. Patient in sitting position. Monitoring: NIBP, Pulse ox, Tocodynamometer, FHTs    Prep: Chloroprep,  Sterile drape, Sterile gloves, Mask and Hat    Interspace:  L3-L4    Local Anesthesia: 1% Lidocaine 3 cc    Epidural Needle: Liu 18 gauge 3.5 inches    Wet Tap: No    Loss of Resistance: with air at 8 cm    Epidural Catheter: Siegel 20 gauge  and distance in epidural space 5 cm     Epidural Bolus: Bupivicaine 0.25% 15 mL and Fentanyl 100 mcg    Heme: No    Paresthesias: No    Comments:     Patient vital signs and fetal heart tracings monitored continuously during procedure and immediately post procedure. Vital signs recorded every 3 minutes. Patient tolerated procedure.     Shan Vizcaino MD  6/25/2018
periodically utilizes knee braces and does perform his home exercises. She would like to consider repeat Visco supplementation to his knees bilaterally as is helped him substantially the past.  His last round of Euflexxa was December 2014. He was last seen in the office on 10/8/2015 when we completed Euflexxa to his knees bilaterally. 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 Denies locking catching or instability. He is done very well with Visco supplementation in the past and has been maintaining his Celebrex 200 mg daily and periodically utilizing his knee brace. Denies locking catching or instability. He would like to resume Visco supplementation bilaterally. She was last seen in the office on 9/6/2016 when we completed Euflexxa to his knees bilaterally. He was doing very well but about a month ago began to notice increasing soreness about the knee particularly anteriorly. There is no interim history of injury or no activity prior to becoming symptomatic. He is not complaining of locking or catching. He does utilize his knee braces and has been performing his home based exercises. He does continue with his Celebrex 200 mg daily. Done recently good relief with physical supplementation in the past and is potentially interested in repeating this. Positional changes and prolonged standing and walking are painful and at times he will have 8 out of 10 pain coming home from work. 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 He was seen in the office on 3/14/2017 for repeat evaluation and consideration of Euflexxa. He is done well with these in the past but Workmen's Comp. wanted us to do a more thorough evaluation and updated x-rays.   He continues to have pain ranging between a 7-8 out

## 2018-10-23 ENCOUNTER — OFFICE VISIT (OUTPATIENT)
Dept: ORTHOPEDIC SURGERY | Age: 48
End: 2018-10-23
Payer: OTHER GOVERNMENT

## 2018-10-23 VITALS — WEIGHT: 235.01 LBS | HEIGHT: 72 IN | BODY MASS INDEX: 31.83 KG/M2

## 2018-10-23 DIAGNOSIS — M25.561 CHRONIC PAIN OF BOTH KNEES: ICD-10-CM

## 2018-10-23 DIAGNOSIS — G89.29 CHRONIC PAIN OF BOTH KNEES: ICD-10-CM

## 2018-10-23 DIAGNOSIS — M22.42 CHONDROMALACIA OF LEFT PATELLA: Primary | ICD-10-CM

## 2018-10-23 DIAGNOSIS — M22.41 CHONDROMALACIA OF RIGHT PATELLA: ICD-10-CM

## 2018-10-23 DIAGNOSIS — M25.562 CHRONIC PAIN OF BOTH KNEES: ICD-10-CM

## 2018-10-23 DIAGNOSIS — M17.10 LOCALIZED OSTEOARTHROSIS, LOWER LEG: ICD-10-CM

## 2018-10-23 PROCEDURE — 20610 DRAIN/INJ JOINT/BURSA W/O US: CPT | Performed by: FAMILY MEDICINE

## 2018-10-23 PROCEDURE — 99999 PR OFFICE/OUTPT VISIT,PROCEDURE ONLY: CPT | Performed by: FAMILY MEDICINE

## 2018-10-23 NOTE — PROGRESS NOTES
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 Fang Sears who is being seen today for reevaluation of his knees bilaterally and to continue with his Visco supplementation series. He is doing much better and is at least 80% improved over last week. Previously, he did have some increasing achiness with prolonged walking and going up and down stairs and steps but for the most part is doing reasonably well. He has been performing his home exercise program on a fairly regular basis and does take his chronic Celebrex 200 mg daily. He occasionally utilizes his braces and is here today for his 2nd Euflexxa injection bilaterally. PE: no substantial change in exam.    Assessment:  Knee Osteoarthritis with viscosupplementation      PROCEDURE NOTE:    PRE-PROCEDURE DIAGNOSIS: DJD knee    POST-PROCEDURE DIAGNOSIS: DJD knee    Injection #2 of Euflexxa . PROCEDURE:  With the patient's permission, his bilaterally knee was prepped in standard sterile fashion with Betadine and Alcohol and the prefilled 2cc injection of Euflexxa was injected intra-articularly into the bilaterally knee via a superolateral approach without difficulty. The patient tolerated this well without difficulty. A band-aid was applied. POST-PROCEDURE INSTRUCTIONS GIVEN TO PATIENT: The patient was advised to ice the knee for 15-20 minutes to relieve any injection site related pain. FOLLOW-UP: as directed. He will continue with his Celebrex 200 mg daily and importance of complying with his home exercise program and periodic use of his brace and icing was also discussed. He'll be seen back in one week to complete is Euflexxa series.   He has done well with Visco supplementation in the past.

## 2018-10-29 RX ORDER — PRAVASTATIN SODIUM 80 MG/1
TABLET ORAL
Qty: 30 TABLET | Refills: 0 | Status: SHIPPED | OUTPATIENT
Start: 2018-10-29 | End: 2018-11-30 | Stop reason: SDUPTHER

## 2018-10-30 ENCOUNTER — OFFICE VISIT (OUTPATIENT)
Dept: ORTHOPEDIC SURGERY | Age: 48
End: 2018-10-30
Payer: OTHER GOVERNMENT

## 2018-10-30 VITALS — BODY MASS INDEX: 31.83 KG/M2 | HEIGHT: 72 IN | WEIGHT: 235.01 LBS

## 2018-10-30 DIAGNOSIS — M25.562 CHRONIC PAIN OF BOTH KNEES: ICD-10-CM

## 2018-10-30 DIAGNOSIS — G89.29 CHRONIC PAIN OF BOTH KNEES: ICD-10-CM

## 2018-10-30 DIAGNOSIS — M25.561 CHRONIC PAIN OF BOTH KNEES: ICD-10-CM

## 2018-10-30 DIAGNOSIS — M22.41 CHONDROMALACIA OF RIGHT PATELLA: ICD-10-CM

## 2018-10-30 DIAGNOSIS — M17.10 LOCALIZED OSTEOARTHROSIS, LOWER LEG: ICD-10-CM

## 2018-10-30 DIAGNOSIS — M22.42 CHONDROMALACIA OF LEFT PATELLA: Primary | ICD-10-CM

## 2018-10-30 PROCEDURE — 99999 PR OFFICE/OUTPT VISIT,PROCEDURE ONLY: CPT | Performed by: FAMILY MEDICINE

## 2018-10-30 PROCEDURE — 20610 DRAIN/INJ JOINT/BURSA W/O US: CPT | Performed by: FAMILY MEDICINE

## 2018-11-30 ENCOUNTER — OFFICE VISIT (OUTPATIENT)
Dept: INTERNAL MEDICINE CLINIC | Age: 48
End: 2018-11-30
Payer: COMMERCIAL

## 2018-11-30 VITALS
WEIGHT: 240 LBS | DIASTOLIC BLOOD PRESSURE: 72 MMHG | HEIGHT: 72 IN | BODY MASS INDEX: 32.51 KG/M2 | SYSTOLIC BLOOD PRESSURE: 120 MMHG | HEART RATE: 70 BPM | RESPIRATION RATE: 16 BRPM

## 2018-11-30 DIAGNOSIS — I10 BENIGN ESSENTIAL HTN: ICD-10-CM

## 2018-11-30 DIAGNOSIS — Z23 FLU VACCINE NEED: ICD-10-CM

## 2018-11-30 DIAGNOSIS — E78.5 DYSLIPIDEMIA: ICD-10-CM

## 2018-11-30 DIAGNOSIS — E66.9 OBESITY (BMI 30-39.9): ICD-10-CM

## 2018-11-30 DIAGNOSIS — E11.9 CONTROLLED TYPE 2 DIABETES MELLITUS WITHOUT COMPLICATION, WITHOUT LONG-TERM CURRENT USE OF INSULIN (HCC): Primary | ICD-10-CM

## 2018-11-30 DIAGNOSIS — M54.42 LEFT-SIDED LOW BACK PAIN WITH LEFT-SIDED SCIATICA, UNSPECIFIED CHRONICITY: ICD-10-CM

## 2018-11-30 LAB
CREATININE URINE: 81.1 MG/DL (ref 39–259)
MICROALBUMIN UR-MCNC: <1.2 MG/DL
MICROALBUMIN/CREAT UR-RTO: NORMAL MG/G (ref 0–30)

## 2018-11-30 PROCEDURE — 90471 IMMUNIZATION ADMIN: CPT | Performed by: INTERNAL MEDICINE

## 2018-11-30 PROCEDURE — 99214 OFFICE O/P EST MOD 30 MIN: CPT | Performed by: INTERNAL MEDICINE

## 2018-11-30 PROCEDURE — 90686 IIV4 VACC NO PRSV 0.5 ML IM: CPT | Performed by: INTERNAL MEDICINE

## 2018-11-30 RX ORDER — PRAVASTATIN SODIUM 80 MG/1
TABLET ORAL
Qty: 90 TABLET | Refills: 3 | Status: SHIPPED | OUTPATIENT
Start: 2018-11-30 | End: 2018-12-21 | Stop reason: SDUPTHER

## 2018-11-30 NOTE — PATIENT INSTRUCTIONS
Please get a diabetic retinal eye exam.          Patient Education        Back Care and Preventing Injuries: Care Instructions  Your Care Instructions    You can hurt your back doing many everyday activities: lifting a heavy box, bending down to garden, exercising at the gym, and even getting out of bed. But you can keep your back strong and healthy by doing some exercises. You also can follow a few tips for sitting, sleeping, and lifting to avoid hurting your back again. Talk to your doctor before you start an exercise program. Ask for help if you want to learn more about keeping your back healthy. Follow-up care is a key part of your treatment and safety. Be sure to make and go to all appointments, and call your doctor if you are having problems. It's also a good idea to know your test results and keep a list of the medicines you take. How can you care for yourself at home? · Stay at a healthy weight to avoid strain on your lower back. · Do not smoke. Smoking increases the risk of osteoporosis, which weakens the spine. If you need help quitting, talk to your doctor about stop-smoking programs and medicines. These can increase your chances of quitting for good. · Make sure you sleep in a position that maintains your back's normal curves and on a mattress that feels comfortable. Sleep on your side with a pillow between your knees, or sleep on your back with a pillow under your knees. These positions can reduce strain on your back. · When you get out of bed, lie on your side and bend both knees. Drop your feet over the edge of the bed as you push up with both arms. Scoot to the edge of the bed. Make sure your feet are in line with your rear end (buttocks), and then stand up. · If you must stand for a long time, put one foot on a stool, ledge, or box. Exercise to strengthen your back and other muscles  · Get at least 30 minutes of exercise on most days of the week. Walking is a good choice.  You also may want and safety. Be sure to make and go to all appointments, and call your doctor if you are having problems. It's also a good idea to know your test results and keep a list of the medicines you take. Where can you learn more? Go to https://chjamar.DevelopIntelligence. org and sign in to your License Buddy account. Enter R795 in the [employer] box to learn more about \"Low Back Pain: Exercises. \"     If you do not have an account, please click on the \"Sign Up Now\" link. Current as of: November 29, 2017  Content Version: 11.8  © 3962-0665 [employer]. Care instructions adapted under license by [facility] ([facility]). If you have questions about a medical condition or this instruction, always ask your healthcare professional. [address] any warranty or liability for your use of this information. Patient Education        Acute Low Back Pain: Exercises  Your Care Instructions  Here are some examples of typical rehabilitation exercises for your condition. Start each exercise slowly. Ease off the exercise if you start to have pain. Your doctor or physical therapist will tell you when you can start these exercises and which ones will work best for you. When you are not being active, find a comfortable position for rest. Some people are comfortable on the floor or a medium-firm bed with a small pillow under their head and another under their knees. Some people prefer to lie on their side with a pillow between their knees. Don't stay in one position for too long. Take short walks (10 to 20 minutes) every 2 to 3 hours. Avoid slopes, hills, and stairs until you feel better. Walk only distances you can manage without pain, especially leg pain. How to do the exercises  Back stretches    5. Get down on your hands and knees on the floor. 6. Relax your head and allow it to droop. Round your back up toward the ceiling until you feel a nice stretch in your upper, middle, and lower back. Hold this stretch for as long as it feels comfortable, or about 15 to 30 seconds. 7. Return to the starting position with a flat back while you are on your hands and knees. 8. Let your back sway by pressing your stomach toward the floor. Lift your buttocks toward the ceiling. 9. Hold this position for 15 to 30 seconds. 10. Repeat 2 to 4 times. Follow-up care is a key part of your treatment and safety. Be sure to make and go to all appointments, and call your doctor if you are having problems. It's also a good idea to know your test results and keep a list of the medicines you take. Where can you learn more? Go to https://Tynt.WelVU. org and sign in to your Medication Review account. Enter T935 in the [employer]catucanch[employer].com box to learn more about \"Acute Low Back Pain: Exercises. \"     If you do not have an account, please click on the \"Sign Up Now\" link. Current as of: November 29, 2017  Content Version: 11.8  © 4554-9185 [employer]. Care instructions adapted under license by [facility] ([facility]). If you have questions about a medical condition or this instruction, always ask your healthcare professional. [place] [address] any warranty or liability for your use of this information.

## 2018-12-21 ENCOUNTER — TELEPHONE (OUTPATIENT)
Dept: INTERNAL MEDICINE CLINIC | Age: 48
End: 2018-12-21

## 2018-12-21 DIAGNOSIS — E78.5 DYSLIPIDEMIA: ICD-10-CM

## 2018-12-21 RX ORDER — PRAVASTATIN SODIUM 80 MG/1
TABLET ORAL
Qty: 90 TABLET | Refills: 3 | Status: SHIPPED | OUTPATIENT
Start: 2018-12-21 | End: 2020-01-13

## 2019-02-18 ENCOUNTER — TELEPHONE (OUTPATIENT)
Dept: INTERNAL MEDICINE CLINIC | Age: 49
End: 2019-02-18

## 2019-02-18 RX ORDER — INDOMETHACIN 50 MG/1
50 CAPSULE ORAL 3 TIMES DAILY PRN
Qty: 30 CAPSULE | Refills: 1 | Status: SHIPPED | OUTPATIENT
Start: 2019-02-18

## 2019-03-25 RX ORDER — ALLOPURINOL 100 MG/1
TABLET ORAL
Qty: 180 TABLET | Refills: 1 | Status: SHIPPED | OUTPATIENT
Start: 2019-03-25 | End: 2020-05-20

## 2019-05-01 ENCOUNTER — TELEPHONE (OUTPATIENT)
Dept: ORTHOPEDIC SURGERY | Age: 49
End: 2019-05-01

## 2019-05-07 ENCOUNTER — OFFICE VISIT (OUTPATIENT)
Dept: ORTHOPEDIC SURGERY | Age: 49
End: 2019-05-07
Payer: OTHER GOVERNMENT

## 2019-05-07 VITALS — WEIGHT: 240.08 LBS | HEIGHT: 72 IN | BODY MASS INDEX: 32.52 KG/M2

## 2019-05-07 DIAGNOSIS — M22.42 CHONDROMALACIA OF LEFT PATELLA: Primary | ICD-10-CM

## 2019-05-07 DIAGNOSIS — M22.41 CHONDROMALACIA OF RIGHT PATELLA: ICD-10-CM

## 2019-05-07 PROCEDURE — 20610 DRAIN/INJ JOINT/BURSA W/O US: CPT | Performed by: FAMILY MEDICINE

## 2019-05-07 PROCEDURE — 99213 OFFICE O/P EST LOW 20 MIN: CPT | Performed by: FAMILY MEDICINE

## 2019-05-07 RX ORDER — CELECOXIB 200 MG/1
200 CAPSULE ORAL DAILY
Qty: 90 CAPSULE | Refills: 1 | Status: SHIPPED | OUTPATIENT
Start: 2019-05-07 | End: 2019-10-17 | Stop reason: SDUPTHER

## 2019-05-07 RX ORDER — HYALURONATE SODIUM 10 MG/ML
40 SYRINGE (ML) INTRAARTICULAR ONCE
Status: COMPLETED | OUTPATIENT
Start: 2019-05-07 | End: 2019-05-07

## 2019-05-07 RX ADMIN — Medication 40 MG: at 09:12

## 2019-05-07 NOTE — PROGRESS NOTES
Chief Complaint  Knee Pain ([facility] EUFLEXXA #1 BILATERAL KNEES)    followup recurrent bilateral knee pain with substantial patellofemoral arthropathy with underlying knee osteoarthritis with worsening pain    History of Present Illness:  Ericka Mitchell is a 44 y.o. male Roberto Dove is a 61-year-old white male type II diabetic who is a walking  for the [employer]. He is being seen back today for 6 month follow-up on his bilateral knee symptomatic chondromalacia patella with underlying knee osteoarthritis. He last completed his physical supplementation on 10/30/2018 which did provide fairly good pain relief for him. He has been working very long hours 12-14 hours per day and has been watching his diet which has resulted in about a 50 pound weight loss which is certainly help the situation. He has been compliant with his home-based exercises and does periodically utilize his knee brace as well as continue with the Celebrex 200 mg daily. There is no interim history of injury. He does have pain which is primarily more achy in nature in the range of 3-4 out of 10 with some occasional nighttime discomfort. He has been compliant with his home exercises. He has been considering viscous supplementation once again. He has been working full duty. Denies locking catching or true instability symptoms. Medical History     Patient's medications, allergies, past medical, surgical, social and family histories were reviewed and updated as appropriate. Review of Systems  Relevant review of systems reviewed and available in the patient's chart    Vital Signs  There were no vitals filed for this visit. General Exam:       Constitutional: Patient is adequately groomed with no evidence of malnutrition  DTRs: Deep tendon reflexes are intact  Mental Status: The patient is oriented to time, place and person. The patient's mood and affect are appropriate.   Vascular: Examination reveals no swelling or calf tenderness. Peripheral pulses are palpable and 2+. Neurological: The patient has good coordination. There is no weakness or sensory deficit. Knee Examination  Inspection:  He is in slight varus and has trace  effusions bilaterally. There is marked patellofemoral crepitation. No high-grade malalignment. Palpation: He has recurrent tenderness to palpation over the medial joint line and also over the retropatellar joint. He does have a mildly to moderately positive grind testing. This is certainly more prominent than last week. He is most tender along the anterior 3rd medial joint lines. He rates this at about of 5-6 out of 10. Rang of Motion:  Range of motion -5-120 bilaterally. Strength:  Strength testing 4+ out of 5 knee flexion extension. Special Tests:  He is a mildly to moderately positive grind test and pain with medial Helen's. I do not feel high-grade click but certainly this worsens his pain bilaterally. This is remarkable for crepitation than actual pop or click. No evidence of instability. Skin: There are no rashes, ulcerations or lesions. Distal neurovascular exam is intact. Gait: Improved gait. Reflex reflexes are preserved her    Additional Comments:     Additional Examinations:  Right Lower Extremity: Examination of the right lower extremity does not show any tenderness, deformity or injury. Range of motion is unremarkable. There is no gross instability. There are no rashes, ulcerations or lesions. Strength and tone are normal.  Left Lower Extremity: Examination of the left lower extremity does not show any tenderness, deformity or injury. Range of motion is unremarkable. There is no gross instability. There are no rashes, ulcerations or lesions. Strength and tone are normal.  Lower Back: Examination of the lower back does not show any tenderness, deformity or injury. Range of motion is unremarkable. There is no gross instability.   There are no rashes, ulcerations or lesions. Strength and tone are normal.  Examination of the bilateral hip reveals intact skin. The patient demonstrates full painless range of motion with regards to flexion, abduction, internal and external rotation. There is not tenderness about the greater trochanter. There is a negative straight leg raise against resistance. Strength is 5/5 thorough out all planes. Diagnostic Test Findings: Bilateral knee MRIs performed 5/5/2017 as listed above    Right knee MRI 5/5/2017  CONCLUSION:    1. Grade 3-4 chondromalacia of lateral facet of the patella with foci of osteochondral erosion    and chondral fissuring. 2. No meniscal tear or acute ligamentous injury. 3. Mild chronic lateral capsulitis and popliteal tendinopathy. 4. Enthesopathy of superior patellar tendon, no tear. Left knee MRI 5/5/2017  CONCLUSION:    1. Stable grade 3-4 chondromalacia of lateral facet of the patella with focal osteochondral    erosion, chondral thinning and fissuring. Grade 3 chondromalacia of superolateral trochlea with    chondral thinning and fissuring. 2. Stable chronic mild-moderate tendinopathy of superior patellar tendon with a 2cm slit-like    interstitial tear. 3. Progressed lateral capsulitis with chronic moderate tendinopathy of popliteal tendon. Does    the patient have gout? 4. 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 5. Stable grade 2 chondromalacia of weightbearing medial femorotibial compartment. Assessment :  #1. Recurrent symptomatic bilateral knee aggravation osteoarthritis with advanced bilateral knee chondromalacia patella with recurrent mild synovitis    #2. Well-controlled Type II diabetes. Impression:  Encounter Diagnoses   Name Primary?     Chondromalacia of left patella Yes    Chondromalacia of right patella        Office Procedures:  Orders Placed This Encounter   Procedures    [place] ARTHROCENTESIS ASPIR&/INJ MAJOR

## 2019-05-14 ENCOUNTER — OFFICE VISIT (OUTPATIENT)
Dept: ORTHOPEDIC SURGERY | Age: 49
End: 2019-05-14
Payer: OTHER GOVERNMENT

## 2019-05-14 VITALS — WEIGHT: 240.08 LBS | BODY MASS INDEX: 32.52 KG/M2 | HEIGHT: 72 IN

## 2019-05-14 DIAGNOSIS — G89.29 CHRONIC PAIN OF BOTH KNEES: ICD-10-CM

## 2019-05-14 DIAGNOSIS — M25.562 CHRONIC PAIN OF BOTH KNEES: ICD-10-CM

## 2019-05-14 DIAGNOSIS — M22.42 CHONDROMALACIA OF BOTH PATELLAE: ICD-10-CM

## 2019-05-14 DIAGNOSIS — M25.561 CHRONIC PAIN OF BOTH KNEES: ICD-10-CM

## 2019-05-14 DIAGNOSIS — M17.10 LOCALIZED OSTEOARTHROSIS, LOWER LEG: Primary | ICD-10-CM

## 2019-05-14 DIAGNOSIS — M22.41 CHONDROMALACIA OF BOTH PATELLAE: ICD-10-CM

## 2019-05-14 PROCEDURE — 20610 DRAIN/INJ JOINT/BURSA W/O US: CPT | Performed by: FAMILY MEDICINE

## 2019-05-14 PROCEDURE — 99999 PR OFFICE/OUTPT VISIT,PROCEDURE ONLY: CPT | Performed by: FAMILY MEDICINE

## 2019-05-14 RX ORDER — HYALURONATE SODIUM 10 MG/ML
40 SYRINGE (ML) INTRAARTICULAR ONCE
Status: COMPLETED | OUTPATIENT
Start: 2019-05-14 | End: 2019-05-14

## 2019-05-14 RX ADMIN — Medication 40 MG: at 09:49

## 2019-05-14 NOTE — PROGRESS NOTES
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 Reena Johnson who is being seen today for reevaluation of his knees bilaterally and to continue with his Visco supplementation series. He is doing much better and is at least 80% improved over last week. Previously, he did have some increasing achiness with prolonged walking and going up and down stairs and steps but for the most part is doing reasonably well. He has been performing his home exercise program on a fairly regular basis and does take his chronic Celebrex 200 mg daily. He occasionally utilizes his braces and is here today for his 2nd Euflexxa injection bilaterally. PE: no substantial change in exam.    Assessment:  Knee Osteoarthritis with viscosupplementation      PROCEDURE NOTE:    PRE-PROCEDURE DIAGNOSIS: DJD knee    POST-PROCEDURE DIAGNOSIS: DJD knee    Injection #2 of Euflexxa . PROCEDURE:  With the patient's permission, his bilaterally knee was prepped in standard sterile fashion with Betadine and Alcohol and the prefilled 2cc injection of Euflexxa was injected intra-articularly into the bilaterally knee via a superolateral approach without difficulty. The patient tolerated this well without difficulty. A band-aid was applied. POST-PROCEDURE INSTRUCTIONS GIVEN TO PATIENT: The patient was advised to ice the knee for 15-20 minutes to relieve any injection site related pain. FOLLOW-UP: as directed. He will continue with his Celebrex 200 mg daily and importance of complying with his home exercise program and periodic use of his brace and icing was also discussed. He'll be seen back in one week to complete is Euflexxa series.   He has done well with Visco supplementation in the past.

## 2019-05-21 ENCOUNTER — OFFICE VISIT (OUTPATIENT)
Dept: ORTHOPEDIC SURGERY | Age: 49
End: 2019-05-21
Payer: OTHER GOVERNMENT

## 2019-05-21 VITALS — BODY MASS INDEX: 32.52 KG/M2 | HEIGHT: 72 IN | WEIGHT: 240.08 LBS

## 2019-05-21 DIAGNOSIS — M25.561 CHRONIC PAIN OF BOTH KNEES: ICD-10-CM

## 2019-05-21 DIAGNOSIS — G89.29 CHRONIC PAIN OF BOTH KNEES: ICD-10-CM

## 2019-05-21 DIAGNOSIS — M25.562 CHRONIC PAIN OF BOTH KNEES: ICD-10-CM

## 2019-05-21 DIAGNOSIS — M17.10 LOCALIZED OSTEOARTHROSIS, LOWER LEG: ICD-10-CM

## 2019-05-21 DIAGNOSIS — M22.42 CHONDROMALACIA OF BOTH PATELLAE: Primary | ICD-10-CM

## 2019-05-21 DIAGNOSIS — M22.41 CHONDROMALACIA OF BOTH PATELLAE: Primary | ICD-10-CM

## 2019-05-21 PROCEDURE — 99213 OFFICE O/P EST LOW 20 MIN: CPT | Performed by: FAMILY MEDICINE

## 2019-05-21 PROCEDURE — 20610 DRAIN/INJ JOINT/BURSA W/O US: CPT | Performed by: FAMILY MEDICINE

## 2019-05-21 RX ORDER — CELECOXIB 200 MG/1
200 CAPSULE ORAL DAILY
Qty: 30 CAPSULE | Refills: 6 | Status: SHIPPED | OUTPATIENT
Start: 2019-05-21 | End: 2019-10-17 | Stop reason: SDUPTHER

## 2019-05-21 RX ORDER — HYALURONATE SODIUM 10 MG/ML
40 SYRINGE (ML) INTRAARTICULAR ONCE
Status: COMPLETED | OUTPATIENT
Start: 2019-05-21 | End: 2019-05-21

## 2019-05-21 RX ADMIN — Medication 40 MG: at 13:34

## 2019-05-21 NOTE — PROGRESS NOTES
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 Dennise Lundborg who is being seen today for reevaluation of his knees bilaterally and to continue with his Visco supplementation series. He is doing much better and is at least 80% improved over last week. Previously, he did have some increasing achiness with prolonged walking and going up and down stairs and steps but for the most part is doing reasonably well. He has been performing his home exercise program on a fairly regular basis and does take his chronic Celebrex 200 mg daily. He occasionally utilizes his braces and is here today for his third Euflexxa injection bilaterally. PE: no substantial change in exam.    Assessment:  Knee Osteoarthritis with viscosupplementation      PROCEDURE NOTE:    PRE-PROCEDURE DIAGNOSIS: DJD knee    POST-PROCEDURE DIAGNOSIS: DJD knee    Injection #3 of Euflexxa . PROCEDURE:  With the patient's permission, his bilaterally knee was prepped in standard sterile fashion with Betadine and Alcohol and the prefilled 2cc injection of Euflexxa was injected intra-articularly into the bilaterally knee via a superolateral approach without difficulty. The patient tolerated this well without difficulty. A band-aid was applied. POST-PROCEDURE INSTRUCTIONS GIVEN TO PATIENT: The patient was advised to ice the knee for 15-20 minutes to relieve any injection site related pain. FOLLOW-UP: as directed. He will continue with his Celebrex 200 mg daily and importance of complying with his home exercise program and periodic use of his brace and icing was also discussed. He has done well with Visco supplementation in the past.  He is aware that he can have repeat Visco supplementation in 6 months or potentially even an intra-articular steroid injection if necessary.

## 2019-06-27 DIAGNOSIS — I10 BENIGN ESSENTIAL HTN: ICD-10-CM

## 2019-06-28 RX ORDER — LISINOPRIL 5 MG/1
TABLET ORAL
Qty: 30 TABLET | Refills: 0 | Status: SHIPPED | OUTPATIENT
Start: 2019-06-28 | End: 2019-08-22 | Stop reason: SDUPTHER

## 2019-06-28 RX ORDER — EMPAGLIFLOZIN 10 MG/1
TABLET, FILM COATED ORAL
Qty: 30 TABLET | Refills: 0 | Status: SHIPPED | OUTPATIENT
Start: 2019-06-28 | End: 2019-08-22 | Stop reason: SDUPTHER

## 2019-07-09 ENCOUNTER — TELEPHONE (OUTPATIENT)
Dept: INTERNAL MEDICINE CLINIC | Age: 49
End: 2019-07-09

## 2019-08-22 DIAGNOSIS — I10 BENIGN ESSENTIAL HTN: ICD-10-CM

## 2019-08-23 RX ORDER — LISINOPRIL 5 MG/1
TABLET ORAL
Qty: 15 TABLET | Refills: 0 | Status: SHIPPED | OUTPATIENT
Start: 2019-08-23 | End: 2019-09-24 | Stop reason: SDUPTHER

## 2019-08-23 RX ORDER — EMPAGLIFLOZIN 10 MG/1
TABLET, FILM COATED ORAL
Qty: 15 TABLET | Refills: 0 | Status: SHIPPED | OUTPATIENT
Start: 2019-08-23 | End: 2019-09-24 | Stop reason: SDUPTHER

## 2019-09-24 ENCOUNTER — TELEPHONE (OUTPATIENT)
Dept: INTERNAL MEDICINE CLINIC | Age: 49
End: 2019-09-24

## 2019-09-24 DIAGNOSIS — I10 BENIGN ESSENTIAL HTN: ICD-10-CM

## 2019-09-24 RX ORDER — LISINOPRIL 5 MG/1
TABLET ORAL
Qty: 30 TABLET | Refills: 0 | Status: SHIPPED | OUTPATIENT
Start: 2019-09-24 | End: 2020-04-21 | Stop reason: SDUPTHER

## 2019-09-24 NOTE — TELEPHONE ENCOUNTER
He is way overdue for his checkup and lab. We can keep doing this. Too much time goes in between these visits and lab testing.   This will be the last time I refill the medication without an appropriately timed visit

## 2019-09-26 DIAGNOSIS — I10 BENIGN ESSENTIAL HTN: ICD-10-CM

## 2019-09-26 RX ORDER — LISINOPRIL 5 MG/1
TABLET ORAL
Qty: 90 TABLET | Refills: 0 | Status: CANCELLED | OUTPATIENT
Start: 2019-09-26

## 2019-10-17 ENCOUNTER — OFFICE VISIT (OUTPATIENT)
Dept: INTERNAL MEDICINE CLINIC | Age: 49
End: 2019-10-17
Payer: COMMERCIAL

## 2019-10-17 VITALS
HEART RATE: 63 BPM | DIASTOLIC BLOOD PRESSURE: 76 MMHG | HEIGHT: 72 IN | WEIGHT: 233 LBS | SYSTOLIC BLOOD PRESSURE: 120 MMHG | RESPIRATION RATE: 12 BRPM | BODY MASS INDEX: 31.56 KG/M2

## 2019-10-17 DIAGNOSIS — E79.0 HYPERURICEMIA: ICD-10-CM

## 2019-10-17 DIAGNOSIS — K75.81 NASH (NONALCOHOLIC STEATOHEPATITIS): ICD-10-CM

## 2019-10-17 DIAGNOSIS — R19.7 DIARRHEA, UNSPECIFIED TYPE: ICD-10-CM

## 2019-10-17 DIAGNOSIS — E78.5 DYSLIPIDEMIA: ICD-10-CM

## 2019-10-17 DIAGNOSIS — E11.9 CONTROLLED TYPE 2 DIABETES MELLITUS WITHOUT COMPLICATION, WITHOUT LONG-TERM CURRENT USE OF INSULIN (HCC): Primary | ICD-10-CM

## 2019-10-17 DIAGNOSIS — Z23 NEED FOR SHINGLES VACCINE: ICD-10-CM

## 2019-10-17 DIAGNOSIS — Z12.5 SCREENING FOR PROSTATE CANCER: ICD-10-CM

## 2019-10-17 DIAGNOSIS — I10 BENIGN ESSENTIAL HTN: ICD-10-CM

## 2019-10-17 DIAGNOSIS — Z23 FLU VACCINE NEED: ICD-10-CM

## 2019-10-17 LAB
CREATININE URINE: 124 MG/DL (ref 39–259)
MICROALBUMIN UR-MCNC: <1.2 MG/DL
MICROALBUMIN/CREAT UR-RTO: NORMAL MG/G (ref 0–30)

## 2019-10-17 PROCEDURE — 90471 IMMUNIZATION ADMIN: CPT | Performed by: INTERNAL MEDICINE

## 2019-10-17 PROCEDURE — 99214 OFFICE O/P EST MOD 30 MIN: CPT | Performed by: INTERNAL MEDICINE

## 2019-10-17 PROCEDURE — 90686 IIV4 VACC NO PRSV 0.5 ML IM: CPT | Performed by: INTERNAL MEDICINE

## 2019-12-17 ENCOUNTER — OFFICE VISIT (OUTPATIENT)
Dept: ORTHOPEDIC SURGERY | Age: 49
End: 2019-12-17
Payer: OTHER GOVERNMENT

## 2019-12-17 VITALS — BODY MASS INDEX: 31.56 KG/M2 | HEIGHT: 72 IN | WEIGHT: 233.03 LBS

## 2019-12-17 DIAGNOSIS — M22.42 CHONDROMALACIA OF BOTH PATELLAE: Primary | ICD-10-CM

## 2019-12-17 DIAGNOSIS — M25.562 CHRONIC PAIN OF BOTH KNEES: ICD-10-CM

## 2019-12-17 DIAGNOSIS — M22.41 CHONDROMALACIA OF BOTH PATELLAE: Primary | ICD-10-CM

## 2019-12-17 DIAGNOSIS — M25.561 CHRONIC PAIN OF BOTH KNEES: ICD-10-CM

## 2019-12-17 DIAGNOSIS — G89.29 CHRONIC PAIN OF BOTH KNEES: ICD-10-CM

## 2019-12-17 PROCEDURE — 99213 OFFICE O/P EST LOW 20 MIN: CPT | Performed by: FAMILY MEDICINE

## 2019-12-17 RX ORDER — METHYLPREDNISOLONE 4 MG/1
TABLET ORAL
Qty: 21 KIT | Refills: 0 | Status: SHIPPED | OUTPATIENT
Start: 2019-12-17 | End: 2020-04-21 | Stop reason: ALTCHOICE

## 2020-01-13 RX ORDER — PRAVASTATIN SODIUM 80 MG/1
TABLET ORAL
Qty: 90 TABLET | Refills: 3 | Status: SHIPPED | OUTPATIENT
Start: 2020-01-13 | End: 2020-12-11

## 2020-01-13 NOTE — TELEPHONE ENCOUNTER
Last appointment: 10/17/2019  Next appointment: Visit date not found  Last refill: 12/21/18 # 90 with 3 refills

## 2020-03-24 ENCOUNTER — TELEPHONE (OUTPATIENT)
Dept: ORTHOPEDIC SURGERY | Age: 50
End: 2020-03-24

## 2020-04-01 ENCOUNTER — TELEPHONE (OUTPATIENT)
Dept: INTERNAL MEDICINE CLINIC | Age: 50
End: 2020-04-01

## 2020-04-03 RX ORDER — SILDENAFIL CITRATE 20 MG/1
40-100 TABLET ORAL PRN
Qty: 30 TABLET | Refills: 1 | Status: SHIPPED | OUTPATIENT
Start: 2020-04-03 | End: 2020-04-21 | Stop reason: SDUPTHER

## 2020-04-14 ENCOUNTER — TELEPHONE (OUTPATIENT)
Dept: ORTHOPEDIC SURGERY | Age: 50
End: 2020-04-14

## 2020-04-14 NOTE — TELEPHONE ENCOUNTER
PATIENT MADE AWARE OF DENIAL FOR CORTISONE AND EUFLEXXA INJECTIONS. ADVISED TO SPEAK TO  TO SEE WHAT THE REASONING IS AND WAS INSTRUCTED TO CALL BACK IF WE NEEDED TO RESUBMIT.  PATIENT AGREED TO THIS PLAN

## 2020-04-16 RX ORDER — EMPAGLIFLOZIN 10 MG/1
TABLET, FILM COATED ORAL
Qty: 30 TABLET | Refills: 0 | Status: SHIPPED | OUTPATIENT
Start: 2020-04-16 | End: 2020-04-17

## 2020-04-17 RX ORDER — EMPAGLIFLOZIN 10 MG/1
TABLET, FILM COATED ORAL
Qty: 90 TABLET | Refills: 0 | Status: SHIPPED | OUTPATIENT
Start: 2020-04-17 | End: 2020-07-27

## 2020-04-21 ENCOUNTER — TELEMEDICINE (OUTPATIENT)
Dept: INTERNAL MEDICINE CLINIC | Age: 50
End: 2020-04-21
Payer: COMMERCIAL

## 2020-04-21 PROCEDURE — 99214 OFFICE O/P EST MOD 30 MIN: CPT | Performed by: INTERNAL MEDICINE

## 2020-04-21 RX ORDER — SITAGLIPTIN AND METFORMIN HYDROCHLORIDE 1000; 50 MG/1; MG/1
TABLET, FILM COATED ORAL
Qty: 180 TABLET | Refills: 3 | Status: SHIPPED | OUTPATIENT
Start: 2020-04-21 | End: 2021-06-01

## 2020-04-21 RX ORDER — SILDENAFIL CITRATE 20 MG/1
100 TABLET ORAL PRN
Qty: 60 TABLET | Refills: 1 | Status: SHIPPED | OUTPATIENT
Start: 2020-04-21 | End: 2021-03-10 | Stop reason: ALTCHOICE

## 2020-04-21 RX ORDER — LISINOPRIL 5 MG/1
TABLET ORAL
Qty: 90 TABLET | Refills: 3 | Status: SHIPPED | OUTPATIENT
Start: 2020-04-21 | End: 2021-06-01

## 2020-04-21 NOTE — PATIENT INSTRUCTIONS
To Do:    #1 obtain your lab tests as soon as the state home restrictions are lifted. All you have to do is drop into the lab. If the office lab is still not open, you can go to the hospital outpatient lab    #2 continue focusing on Lifestyle modification including low calorie diet focusing on Low fat/low cholesterol and low carbohydrate intake, along with  increasing cardiovascular (aerobic) exercise. #3 update your diabetic retinal eye exam as soon as possible. This is overdue    #4 at your next visit we will talk about hepatitis B vaccination which is recommended for all diabetics    #5 it is likely time to revisit with the liver specialist.  You saw Dr. Marisabel Monroe in 2017.

## 2020-04-21 NOTE — PROGRESS NOTES
without long-term current use of insulin ([address]  Diabetes seems well controlled based on his reported home blood sugar readings. Continue current medications. Monitor for hypoglycemia  Monitor for adverse side effects from the medication particularly the Jardiance. Monitor for genital mycotic infections, hypotension  Continue lifestyle modification approach. Patient counseled  Due for lab. Patient advised to obtain lab as soon as the state home restrictions during the coronavirus pandemic emergency are lifted. - sitaGLIPtan-metformin (JANUMET)  MG per tablet; TAKE 1 TABLET BY MOUTH TWICE A DAY WITH FOOD  Dispense: 180 tablet; Refill: 3  - Hemoglobin A1C; Future  - Comprehensive Metabolic Panel; Future  - Lipid Panel; Future    2. Benign essential HTN  Blood pressure is uncontrolled  Restart lisinopril 5 mg daily. Patient should monitor blood pressure at home  Counseled on the DASH diet  - lisinopril (PRINIVIL;ZESTRIL) 5 MG tablet; TAKE 1 TABLET BY MOUTH EVERY DAY (Patient not taking: Reported on 4/21/2020)  Dispense: 90 tablet; Refill: 3  - Comprehensive Metabolic Panel; Future  - Lipid Panel; Future    3. Erectile dysfunction, unspecified erectile dysfunction type  Condition is uncontrolled  Refill sildenafil 100 mg as needed. - sildenafil (REVATIO) 20 MG tablet; Take 5 tablets by mouth as needed (Erectyle dysfunction)  Dispense: 60 tablet; Refill: 1    4. KOEHLER (nonalcoholic steatohepatitis)  Condition stability is uncertain. His last lab tests had mild elevation of his transaminases  Patient may need to follow-up with liver specialist.  Consider repeating liver ultrasound. Elastography may be needed. Patient was seen by Dr. Alla Louis back in 2017  - Comprehensive Metabolic Panel; Future    5. Hyperlipidemia, mixed  Condition stability and control are uncertain. Due for lab  Continue lifestyle approach as noted above.   Continue statin therapy as part of his cardiovascular disease risk reduction

## 2020-05-20 RX ORDER — ALLOPURINOL 100 MG/1
TABLET ORAL
Qty: 180 TABLET | Refills: 1 | Status: SHIPPED | OUTPATIENT
Start: 2020-05-20 | End: 2022-05-06 | Stop reason: SDUPTHER

## 2020-07-27 RX ORDER — EMPAGLIFLOZIN 10 MG/1
TABLET, FILM COATED ORAL
Qty: 90 TABLET | Refills: 0 | Status: SHIPPED | OUTPATIENT
Start: 2020-07-27 | End: 2020-12-10

## 2020-07-27 NOTE — PROGRESS NOTES
I have personally performed a face to face diagnostic evaluation on this patient. I have reviewed the ACP note and agree with the history, exam and plan of care, except as noted. walking. He has been working on his home-based exercises and taking his Celebrex. Denies active locking or catching. He does have difficulty with attempted squatting and going up and downstairs as well. He has no substantial rest or night pain currently. 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 He was seen in the office on 3/21/2017 and given his increasing pain did have steroid injections performed to his knees. He presents back today stating that his symptoms have improved. He is having less pain with prolonged standing walking and going up and downstairs. He has been working on his home-based exercises continued with his Celebrex. He is at least 70% improved overall. He is been contemplating repeat Visco supplementation which was last completed on 9/6/2016. He is done well for a number of years with physical supplementation would like to put off potential surgical intervention as long as possible. He was last seen in the office on 4/11/2017 and did receive his 2nd Euflexxa injection. Initially he was doing reasonably well but over the weekend on 4/15/2017 he did have recurrent bilateral anterior knee pain. There is no history of fall or additional activity which she can blame on his worsening knee pain. He did develop an effusion. He was having pseudo-buckling and then had to utilize his brace ice and continue with his medications over the weekend. He has had a couple of these episodes the past but this is the 1st one that is been lingering. He is not really complaining of locking or catching. His previous MRI back in 2012 revealed advanced patellofemoral arthropathy with less weightbearing osteoarthritic change and no meniscus tear. He may be developing some catching bilaterally. He has been icing. 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 She does continue with his Celebrex.   His pain ranges the day. He does take his Celebrex does periodically utilizes increases and performs his home-based exercises. Denies locking catching or instability. He was last seen in the office on 11/7/2017 he completed Euflexxa to his knees bilaterally. Once again does have known bilateral knee grade 3-4 chondromalacia patella with primarily grade 2 medial compartment osteoarthritis. He did reasonably well but over the last few weeks has developed achiness involving his knees bilaterally which she rates at about a 5 out of 10. He has continued with his Celebrex and his home-based exercise program does utilize his knee braces. Catching or instability. He is to be done well with Visco supplementation in the past which was last completed on 11/7/2017. He would like to put off knee surgery as long as possible. He was last seen in the office on 3/13/2018 and was continued on conservative treatment for his bilateral knee grade 3-4 chondromalacia patella with medial compartment grade 2 osteoarthritis. He has noticed increasing pain over the last few weeks as he has been performing primarily is walking around doing frequent stair climbing and going up and down hills. He has been fairly consistent with his home-based exercises use of his knee braces and is chronic Celebrex. He last completed Visco supplementation to his knees on 11/7/2017. Last steroid injection was 3/13/2018. Eyes definitive locking or true instability symptoms. He was last seen in the office on 9/25/2018 and was continued on conservative treatment for his bilateral knee grade 3-4 chondromalacia patella with medial compartment grade 2 osteoarthritis. 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 Denies interim history of injury.   He once again has done reasonably well with interstitial tear. 3. Progressed lateral capsulitis with chronic moderate tendinopathy of popliteal tendon. Does    the patient have gout? 4. 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 5. Stable grade 2 chondromalacia of weightbearing medial femorotibial compartment. Assessment :  #1. Recurrent symptomatic bilateral knee aggravation osteoarthritis with synovitis and advanced bilateral knee chondromalacia patella with recurrent Mild To moderate synovitis with bilateral knee Euflexxa No. 1    #2. Well-controlled Type II diabetes. Impression:  Encounter Diagnoses   Name Primary?  Chondromalacia of left patella Yes    Chondromalacia of right patella     Chronic pain of both knees        Office Procedures:  Orders Placed This Encounter   Procedures    EUFLEXXA INJ PER DOSE    [place] ARTHROCENTESIS ASPIR&/INJ MAJOR JT/BURSA W/O US       Treatment Plan:  Treatment options were discussed with Bárbara Godfrey. Clinically at this point he Is feeling a little bit more sore Over the last few weeks. It's been almost 11.5 months since his last round of Visco supplementation which was completed bilaterally on 11/7/2017. He has done well with these in the past.  After discussion of pros and cons of viscous augmentation, he did receive his 1st injection of Euflexxa to his knees bilaterally. This was performed using the standard prefilled 2 mL syringe. He is uncertain as to whether or not he needs steroid injection initially but will think about this over the next few days when they perform this in the interim as we go through his Euflexxa series. He will continue with his Celebrex and will continue with periodic use of his knee bracing his home-based exercise program.  He is effectively working full duty without restrictions and even doing his walking route.   He will be seen back each the next 2 weeks to complete his Euflexxa series unless he decides to have the interim steroid injection

## 2020-08-19 RX ORDER — EMPAGLIFLOZIN 10 MG/1
TABLET, FILM COATED ORAL
Qty: 90 TABLET | Refills: 0 | OUTPATIENT
Start: 2020-08-19

## 2020-09-29 ENCOUNTER — OFFICE VISIT (OUTPATIENT)
Dept: INTERNAL MEDICINE CLINIC | Age: 50
End: 2020-09-29
Payer: COMMERCIAL

## 2020-09-29 VITALS
BODY MASS INDEX: 30.07 KG/M2 | SYSTOLIC BLOOD PRESSURE: 120 MMHG | HEART RATE: 62 BPM | DIASTOLIC BLOOD PRESSURE: 74 MMHG | RESPIRATION RATE: 12 BRPM | HEIGHT: 72 IN | WEIGHT: 222 LBS | TEMPERATURE: 98.7 F

## 2020-09-29 LAB
CREATININE URINE: 94.1 MG/DL (ref 39–259)
MICROALBUMIN UR-MCNC: <1.2 MG/DL
MICROALBUMIN/CREAT UR-RTO: NORMAL MG/G (ref 0–30)

## 2020-09-29 PROCEDURE — 93000 ELECTROCARDIOGRAM COMPLETE: CPT | Performed by: INTERNAL MEDICINE

## 2020-09-29 PROCEDURE — 90471 IMMUNIZATION ADMIN: CPT | Performed by: INTERNAL MEDICINE

## 2020-09-29 PROCEDURE — 99396 PREV VISIT EST AGE 40-64: CPT | Performed by: INTERNAL MEDICINE

## 2020-09-29 PROCEDURE — 90686 IIV4 VACC NO PRSV 0.5 ML IM: CPT | Performed by: INTERNAL MEDICINE

## 2020-09-29 RX ORDER — TADALAFIL 20 MG/1
20 TABLET ORAL DAILY PRN
Qty: 10 TABLET | Refills: 0 | Status: SHIPPED | OUTPATIENT
Start: 2020-09-29 | End: 2021-03-10 | Stop reason: ALTCHOICE

## 2020-09-29 RX ORDER — ZOSTER VACCINE RECOMBINANT, ADJUVANTED 50 MCG/0.5
0.5 KIT INTRAMUSCULAR SEE ADMIN INSTRUCTIONS
Qty: 0.5 ML | Refills: 0 | Status: SHIPPED | OUTPATIENT
Start: 2020-09-29 | End: 2021-03-28

## 2020-09-29 ASSESSMENT — PATIENT HEALTH QUESTIONNAIRE - PHQ9
SUM OF ALL RESPONSES TO PHQ QUESTIONS 1-9: 0
2. FEELING DOWN, DEPRESSED OR HOPELESS: 0
1. LITTLE INTEREST OR PLEASURE IN DOING THINGS: 0
SUM OF ALL RESPONSES TO PHQ9 QUESTIONS 1 & 2: 0
SUM OF ALL RESPONSES TO PHQ QUESTIONS 1-9: 0

## 2020-09-29 NOTE — PROGRESS NOTES
Vaccine Information Sheet, \"Influenza - Inactivated\"  given to Brian Brian, or parent/legal guardian of  Brian Brian and verbalized understanding. Patient responses:    Have you ever had a reaction to a flu vaccine? No  Do you have any current illness? No  Have you ever had Guillian Stockton Syndrome? No  Do you have a serious allergy to any of the follow: Neomycin, Polymyxin, Thimerosal, eggs or egg products? No    Flu vaccine given per order. Please see immunization tab. Risks and benefits explained. Current VIS given.       Immunizations Administered     Name Date Dose Route    Influenza, Quadv, IM, PF (6 mo and older Fluzone, Flulaval, Fluarix, and 3 yrs and older Afluria) 9/29/2020 0.5 mL Intramuscular    Site: Deltoid- Left    Lot: [id number]    NDC: 01466-148-20

## 2020-09-29 NOTE — PATIENT INSTRUCTIONS
Preventive plan of care for Alessio Purdy        9/29/2020           Preventive Measures Status       Recommendations for screening   Prostate Cancer Screen  Lab Results   Component Value Date    PSA 0.51 10/17/2019     Test recommended and ordered    Colon Cancer Screen  Last colonoscopy: 6/21/2017 Test is due every 5 years. Next colonoscopy due in June 2022   Diabetes Screen  Glucose (mg/dL)   Date Value   10/17/2019 114 (H)    Test recommended and ordered   Cholesterol Screen  Lab Results   Component Value Date    CHOL 114 10/17/2019    TRIG 153 (H) 10/17/2019    HDL 28 (L) 10/17/2019    LDLCALC 55 10/17/2019    LDLDIRECT 122 (H) 11/21/2014    Test recommended and ordered   Hepatitis C screening: None on file Recommended for patients born between [address] and 1967--Not clinically indicated   HIV screening: 10/10/2016 Recommended for patients between the ages of 15-65 who have never been tested regardless of risk--No need to repeat at this time   Aspirin for Cardiovascular Prevention   Yes Continue daily aspirin    Recommended Immunizations    Immunization History   Administered Date(s) Administered    Influenza, Quadv, IM, PF (6 mo and older Fluzone, Flulaval, Fluarix, and 3 yrs and older Afluria) 10/20/2017, 11/30/2018, 10/17/2019, 09/29/2020    Pneumococcal Polysaccharide ([id number]) 10/21/2015    Tdap (Boostrix, Adacel) 10/10/2016    Influenza vaccine: recommended every fall     Pneumonia vaccine: Due at age 72    Shingles vaccine: Shingrix is due. Please check pharmacy for availability    Tetanus vaccine: tetanus and diptheria/pertussis vaccine (Td/Tdap) recommended every 10 years- due 2026              Additional Recommendations   1. Use Sunscreen daily when exposed to the sun to reduce the risk of skin cancer. 2. Continue a healthy lifestyle including a healthy diet and aerobic exercise  3. Always wear a seat belt while in a car  4. Always wear a helmet when riding a bike or motorcycle  5.  Update eye exam-- OVERDUE. 6. Perform monthly self testicle checks  7. See your dentist for routine check up and cleaning. Here are a few  Reliable websites with a variety of health and wellness information:     www.mylifecheck. heart. org     www.nutritionsource. org     www. americanheart. org     www. diabetes. org     www.[email or web address]     www.[employer] ([facility] site)           Patient Education        Diabetes Foot Health: Care Instructions  Your Care Instructions     When you have diabetes, your feet need extra care and attention. Diabetes can damage the nerve endings and blood vessels in your feet, making you less likely to notice when your feet are injured. Diabetes also limits your body's ability to fight infection and get blood to areas that need it. If you get a minor foot injury, it could become an ulcer or a serious infection. With good foot care, you can prevent most of these problems. Caring for your feet can be quick and easy. Most of the care can be done when you are bathing or getting ready for bed. Follow-up care is a key part of your treatment and safety. Be sure to make and go to all appointments, and call your doctor if you are having problems. It's also a good idea to know your test results and keep a list of the medicines you take. How can you care for yourself at home? · Keep your blood sugar close to normal by watching what and how much you eat, monitoring blood sugar, taking medicines if prescribed, and getting regular exercise. · Do not smoke. Smoking affects blood flow and can make foot problems worse. If you need help quitting, talk to your doctor about stop-smoking programs and medicines. These can increase your chances of quitting for good. · Eat a diet that is low in fats. High fat intake can cause fat to build up in your blood vessels and decrease blood flow. · Inspect your feet daily for blisters, cuts, cracks, or sores.  If you cannot see well, use a mirror or have someone help you. · Take care of your feet:  ? Wash your feet every day. Use warm (not hot) water. Check the water temperature with your wrists or other part of your body, not your feet. ? Dry your feet well. Pat them dry. Do not rub the skin on your feet too hard. Dry well between your toes. If the skin on your feet stays moist, bacteria or a fungus can grow, which can lead to infection. ? Keep your skin soft. Use moisturizing skin cream to keep the skin on your feet soft and prevent calluses and cracks. But do not put the cream between your toes, and stop using any cream that causes a rash. ? Clean underneath your toenails carefully. Do not use a sharp object to clean underneath your toenails. Use the blunt end of a nail file or other rounded tool. ? Trim and file your toenails straight across to prevent ingrown toenails. Use a nail clipper, not scissors. Use an emery board to smooth the edges. · Change socks daily. Socks without seams are best, because seams often rub the feet. You can find socks for people with diabetes from specialty catalogs. · Look inside your shoes every day for things like gravel or torn linings, which could cause blisters or sores. · Buy shoes that fit well:  ? Look for shoes that have plenty of space around the toes. This helps prevent bunions and blisters. ? Try on shoes while wearing the kind of socks you will usually wear with the shoes. ? Avoid plastic shoes. They may rub your feet and cause blisters. Good shoes should be made of materials that are flexible and breathable, such as leather or cloth. ? Break in new shoes slowly by wearing them for no more than an hour a day for several days. Take extra time to check your feet for red areas, blisters, or other problems after you wear new shoes. · Do not go barefoot. Do not wear sandals, and do not wear shoes with very thin soles. Thin soles are easy to puncture.  They also do not protect your feet from hot pavement or cold weather. · Have your doctor check your feet during each visit. If you have a foot problem, see your doctor. Do not try to treat an early foot problem at home. Home remedies or treatments that you can buy without a prescription (such as corn removers) can be harmful. · Always get early treatment for foot problems. A minor irritation can lead to a major problem if not properly cared for early. When should you call for help? Call your doctor now or seek immediate medical care if:  · You have a foot sore, an ulcer or break in the skin that is not healing after 4 days, bleeding corns or calluses, or an ingrown toenail. · You have blue or black areas, which can mean bruising or blood flow problems. · You have peeling skin or tiny blisters between your toes or cracking or oozing of the skin. · You have a fever for more than 24 hours and a foot sore. · You have new numbness or tingling in your feet that does not go away after you move your feet or change positions. · You have unexplained or unusual swelling of the foot or ankle. Watch closely for changes in your health, and be sure to contact your doctor if:  · You cannot do proper foot care. Where can you learn more? Go to https://Sound2Light ProductionspeKeek.Alt12 Apps. org and sign in to your Fast [employer] account. Enter A739 in the [facility] box to learn more about \"Diabetes Foot Health: Care Instructions. \"     If you do not have an account, please click on the \"Sign Up Now\" link. Current as of: December 20, 2019               Content Version: 12.5  © 2006-2020 Healthwise, Incorporated. Care instructions adapted under license by [facility] ([facility]). If you have questions about a medical condition or this instruction, always ask your healthcare professional. [place] [address] any warranty or liability for your use of this information.          Patient Education        Diabetic Neuropathy: Care Instructions  Your Care Instructions     When you have diabetes, your blood sugar level may get too high. Over time, high blood sugar levels can damage nerves. This is called diabetic neuropathy. Nerve damage can cause pain, burning, tingling, and numbness and may leave you feeling weak. The feet are often affected. When you have nerve damage in your feet, you cannot feel your feet and toes as well as normal and may not notice cuts or sores. Even a small injury can lead to a serious infection. It is very important that you follow your doctor's advice on foot care. Sometimes diabetes damages nerves that help the body function. If this happens, your blood pressure, sweating, digestion, and urination might be affected. Your doctor may give you a target blood sugar level that is higher or lower than you are used to. Try to keep your blood sugar very close to this target level to prevent more damage. Follow-up care is a key part of your treatment and safety. Be sure to make and go to all appointments, and call your doctor if you are having problems. It's also a good idea to know your test results and keep a list of the medicines you take. How can you care for yourself at home? · Take your medicines exactly as prescribed. Call your doctor if you think you are having a problem with your medicine. It is very important that you take your insulin or diabetes pills as your doctor tells you. · Try to keep blood sugar at your target level. ? Eat a variety of healthy foods, with carbohydrate spread out in your meals. A dietitian can help you plan meals. ? Try to get at least 30 minutes of exercise on most days. ? Check your blood sugar as many times each day as your doctor recommends. · Take and record your blood pressure at home if your doctor tells you to. Learn the importance of the two measures of blood pressure (such as 130 over 80, or 130/80). To take your blood pressure at home:  ?  Ask your doctor to check your blood pressure monitor to be sure it is accurate and the cuff fits you. Also ask your doctor to watch you to make sure that you are using it right. ? Do not use medicine known to raise blood pressure (such as some nasal decongestant sprays) before taking your blood pressure. ? Avoid taking your blood pressure if you have just exercised or are nervous or upset. Rest at least 15 minutes before you take a reading. · Take pain medicines exactly as directed. ? If the doctor gave you a prescription medicine for pain, take it as prescribed. ? If you are not taking a prescription pain medicine, ask your doctor if you can take an over-the-counter medicine. · Do not smoke. Smoking can increase your chance for a heart attack or stroke. If you need help quitting, talk to your doctor about stop-smoking programs and medicines. These can increase your chances of quitting for good. · Limit alcohol to 2 drinks a day for men and 1 drink a day for women. Too much alcohol can cause health problems. · Eat small meals often, rather than 2 or 3 large meals a day. To care for your feet  · Prevent injury by wearing shoes at all times, even when you are indoors. · Do foot care as part of your daily routine. Wash your feet and then rub lotion on your feet, but not between your toes. Use a handheld mirror or magnifying mirror to inspect your feet for blisters, cuts, cracks, or sores. · Have your toenails trimmed and filed straight across. · Wear shoes and socks that fit well. Soft shoes that have good support and that fit well (such as tennis shoes) are best for your feet. · Check your shoes for any loose objects or rough edges before you put them on. · Ask your doctor to check your feet during each visit. Your doctor may notice a foot problem you have missed. · Get early treatment for any foot problem, even a minor one. When should you call for help?    Call your doctor now or seek immediate medical care if:  · You have symptoms of infection, such as:  ? Increased pain, swelling, warmth, or redness. ? Red streaks leading from the area. ? Pus draining from the area. ? A fever. · You have new or worse numbness, pain, or tingling in any part of your body. Watch closely for changes in your health, and be sure to contact your doctor if:  · You have a new problem with your feet, such as:  ? A new sore or ulcer. ? A break in the skin that is not healing after several days. ? Bleeding corns or calluses. ? An ingrown toenail. · You do not get better as expected. Where can you learn more? Go to https://Rainbowpepiceweb.StyleCaster. org and sign in to your Blink for iPhone and Android account. Enter X654 in the [employer] box to learn more about \"Diabetic Neuropathy: Care Instructions. \"     If you do not have an account, please click on the \"Sign Up Now\" link. Current as of: December 20, 2019               Content Version: 12.5  © 2006-2020 [employer]. Care instructions adapted under license by [facility] ([facility]). If you have questions about a medical condition or this instruction, always ask your healthcare professional. [place] [address] any warranty or liability for your use of this information. Patient Education        Diabetic Retinopathy: Care Instructions  Your Care Instructions     Diabetes can damage the small blood vessels in part of your eye. This part of the eye is called the retina. It detects light that enters the eye. Then it sends signals to your brain about what the eye sees. When this type of eye damage happens, it's called diabetic retinopathy. It can lead to poor vision and even blindness. But if you keep your blood sugar and blood pressure levels in your target range, you can help avoid or slow the damage. Follow-up care is a key part of your treatment and safety. Be sure to make and go to all appointments, and call your doctor if you are having problems.  It's also a good idea to know your test results and keep a list of the medicines you take.  How can you care for yourself at home? · Have regular eye exams. Tell your doctor about any changes in your vision. · Keep blood sugar in your target range. ? Eat a variety of healthy foods, and spread carbohydrate throughout the day. You may want to have a dietitian help you plan meals. ? If your doctor recommends it, get more exercise. Walking is a good choice. Bit by bit, increase the amount you walk every day. Try for at least 30 minutes on most days of the week. ? Be safe with medicines. Take your medicine exactly as prescribed. Call your doctor if you think you are having a problem with your medicine. ? Check your blood sugar as often as your doctor recommends. · Eat a low-salt diet. This may help keep your blood pressure at a normal level. You may also need to take medicines to reach your goals. · Do not smoke. Smoking can make this condition worse. If you need help quitting, talk to your doctor about stop-smoking programs and medicines. These can increase your chances of quitting for good. · Avoid risky activities. These include things like weight lifting and some contact sports. They may trigger bleeding in the eye through impact or increased pressure. When should you call for help? Call your doctor now or seek immediate medical care if:  · You have vision changes. Watch closely for changes in your health, and be sure to contact your doctor if:  · You do not get better as expected. Where can you learn more? Go to https://Seamlessjamar.SynapSense. org and sign in to your CoreObjects Software account. Enter R396 in the [facility] box to learn more about \"Diabetic Retinopathy: Care Instructions. \"     If you do not have an account, please click on the \"Sign Up Now\" link. Current as of: December 20, 2019               Content Version: 12.5  © 3488-2557 Healthwise, Incorporated. Care instructions adapted under license by [facility] ([facility]).  If you have questions about a medical condition or this instruction, always ask your healthcare professional. [address] any warranty or liability for your use of this information. Patient Education        Erectile Dysfunction: Care Instructions  Your Care Instructions     A man has erectile dysfunction (ED) when he routinely can't get or keep an erection that allows satisfactory sex. He may not be able to have an erection at any time. Or he may not be able to have one that is firm enough or lasts long enough to complete intercourse. ED is not the same as having trouble getting an erection now and then. That's common. It happens to most men at some time. ED can be caused by problems with the blood vessels, nerves, or hormones. It can be caused by diabetes, heart disease, and injuries. Nerve disorders, such as multiple sclerosis or Parkinson's disease, can also cause it. ED can also be caused by medicines, alcohol, and tobacco. Or it may be caused by depression, stress, grief, or relationship problems. Follow-up care is a key part of your treatment and safety. Be sure to make and go to all appointments, and call your doctor if you are having problems. It's also a good idea to know your test results and keep a list of the medicines you take. How can you care for yourself at home? Lifestyle  · Limit alcohol. Have no more than 2 drinks a day. · Do not smoke. Smoking makes it harder for the blood vessels in the penis to relax and let blood flow in. If you need help quitting, talk to your doctor about stop-smoking programs and medicines. These can increase your chances of quitting for good. · Do not use cocaine, heroin, or other illegal drugs. · Try to reduce stress. · Give yourself time to adjust to change. Changes in your job, family, relationships, home life, and other areas can cause stress. And stress can cause erection problems.   Work with your partner  · Don't assume that you know what your partner likes when it comes to Care instructions adapted under license by [facility] ([facility]). If you have questions about a medical condition or this instruction, always ask your healthcare professional. [place] [address] any warranty or liability for your use of this information. Patient Education        Recombinant Zoster (Shingles) Vaccine: What You Need to Know  Why get vaccinated? Recombinant zoster (shingles) vaccine can prevent shingles. Shingles (also called herpes zoster, or just zoster) is a painful skin rash, usually with blisters. In addition to the rash, shingles can cause fever, headache, chills, or upset stomach. More rarely, shingles can lead to pneumonia, hearing problems, blindness, brain inflammation (encephalitis), or death. The most common complication of shingles is long-term nerve pain called postherpetic neuralgia (PHN). PHN occurs in the areas where the shingles rash was, even after the rash clears up. It can last for months or years after the rash goes away. The pain from PHN can be severe and debilitating. About 10 to 18% of people who get shingles will experience PHN. The risk of PHN increases with age. An older adult with shingles is more likely to develop PHN and have longer lasting and more severe pain than a younger person with shingles. Shingles is caused by the varicella zoster virus, the same virus that causes chickenpox. After you have chickenpox, the virus stays in your body and can cause shingles later in life. Shingles cannot be passed from one person to another, but the virus that causes shingles can spread and cause chickenpox in someone who had never had chickenpox or received chickenpox vaccine. Recombinant shingles vaccine  Recombinant shingles vaccine provides strong protection against shingles. By preventing shingles, recombinant shingles vaccine also protects against PHN. Recombinant shingles vaccine is the preferred vaccine for the prevention of shingles.  However, a different vaccine, live shingles vaccine, may be used in some circumstances. The recombinant shingles vaccine is recommended for adults 50 years and older without serious immune problems. It is given as a two-dose series. This vaccine is also recommended for people who have already gotten another type of shingles vaccine, the live shingles vaccine. There is no live virus in this vaccine. Shingles vaccine may be given at the same time as other vaccines. Talk with your health care provider  Tell your vaccine provider if the person getting the vaccine:  · Has had an allergic reaction after a previous dose of recombinant shingles vaccine, or has any severe, life-threatening allergies. · Is pregnant or breastfeeding. · Is currently experiencing an episode of shingles. In some cases, your health care provider may decide to postpone shingles vaccination to a future visit. People with minor illnesses, such as a cold, may be vaccinated. People who are moderately or severely ill should usually wait until they recover before getting recombinant shingles vaccine. Your health care provider can give you more information. Risks of a vaccine reaction  · A sore arm with mild or moderate pain is very common after recombinant shingles vaccine, affecting about 80% of vaccinated people. Redness and swelling can also happen at the site of the injection. · Tiredness, muscle pain, headache, shivering, fever, stomach pain, and nausea happen after vaccination in more than half of people who receive recombinant shingles vaccine. In clinical trials, about 1 out of 6 people who got recombinant zoster vaccine experienced side effects that prevented them from doing regular activities. Symptoms usually went away on their own in 2 to 3 days. You should still get the second dose of recombinant zoster vaccine even if you had one of these reactions after the first dose.   People sometimes faint after medical procedures, including Instructions  Your Care Instructions     Prostate cancer is the abnormal growth of cells in the prostate gland. This is an organ found just below a man's bladder. Screening can help find prostate cancer early. When it is found and treated early, the cancer may be cured. But it's not always treated. That's because the treatments can cause serious side effects. For most men, prostate cancer won't shorten their lives, especially if they are older and the cancer is growing slowly. Prostate cancer is the second most common type of cancer in men. Most cases occur in men older than 72. The disease runs in families. And it's more common in -American men. Follow-up care is a key part of your treatment and safety. Be sure to make and go to all appointments, and call your doctor if you are having problems. It's also a good idea to know your test results and keep a list of the medicines you take. What is the screening test for prostate cancer? The main screening test for prostate cancer is the prostate-specific antigen (PSA) test. This is a blood test that measures how much PSA is in your blood. A high level may mean that you have an enlarged prostate, an infection, or cancer. Along with the PSA test, you may have a digital (finger) rectal exam. This exam checks for anything abnormal in your prostate. To do the exam, the doctor puts a lubricated, gloved finger into your rectum. If these tests suggest cancer, you may need a prostate biopsy. How is prostate cancer diagnosed? In a biopsy, the doctor takes small tissue samples from your prostate gland. Another doctor then looks at the tissue under a microscope to see if there are cancer cells, signs of infection, or other problems. The results help diagnose prostate cancer. What are the pros and cons of screening? Neither a PSA test nor a digital rectal exam can tell you for sure that you do or do not have cancer.  But they can help you decide if you need more tests, such as a prostate biopsy. Screening tests may be useful because most men with prostate cancer don't have symptoms. It can be hard to know if you have cancer until it is more advanced. And then it's harder to treat. But having a PSA test can also cause harm. The test may show high levels of PSA that aren't caused by cancer. So you could have a prostate biopsy you didn't need. Or the PSA test might be normal when there is cancer, so a cancer might not be found early. The test can also find cancers that would never have caused a problem during your lifetime. So you might have treatment that was not needed. Prostate cancer usually develops late in life and grows slowly. For many men, it does not shorten their lives. Some experts advise screening only for men who are at high risk. Talk with your doctor to see if screening is right for you. Where can you learn more? Go to https://[employer]". org and sign in to your [facility] account. Enter R550 in the [employer] box to learn more about \"Prostate Cancer Screening: Care Instructions. \"     If you do not have an account, please click on the \"Sign Up Now\" link. Current as of: August 22, 2019               Content Version: 12.5  © 2006-2020 [employer]. Care instructions adapted under license by [facility] ([facility]). If you have questions about a medical condition or this instruction, always ask your healthcare professional. [place] [address] any warranty or liability for your use of this information. Patient Education        Well Visit, Men 48 to 72: Care Instructions  Your Care Instructions     Physical exams can help you stay healthy. Your doctor has checked your overall health and may have suggested ways to take good care of yourself. He or she also may have recommended tests. At home, you can help prevent illness with healthy eating, regular exercise, and other steps.   Follow-up care is a key part of your treatment and safety. Be sure to make and go to all appointments, and call your doctor if you are having problems. It's also a good idea to know your test results and keep a list of the medicines you take. How can you care for yourself at home? · Reach and stay at a healthy weight. This will lower your risk for many problems, such as obesity, diabetes, heart disease, and high blood pressure. · Get at least 30 minutes of exercise on most days of the week. Walking is a good choice. You also may want to do other activities, such as running, swimming, cycling, or playing tennis or team sports. · Do not smoke. Smoking can make health problems worse. If you need help quitting, talk to your doctor about stop-smoking programs and medicines. These can increase your chances of quitting for good. · Protect your skin from too much sun. When you're outdoors from 10 a.m. to 4 p.m., stay in the shade or cover up with clothing and a hat with a wide brim. Wear sunglasses that block UV rays. Even when it's cloudy, put broad-spectrum sunscreen (SPF 30 or higher) on any exposed skin. · See a dentist one or two times a year for checkups and to have your teeth cleaned. · Wear a seat belt in the car. Follow your doctor's advice about when to have certain tests. These tests can spot problems early. · Cholesterol. Your doctor will tell you how often to have this done based on your overall health and other things that can increase your risk for heart attack and stroke. · Blood pressure. Have your blood pressure checked during a routine doctor visit. Your doctor will tell you how often to check your blood pressure based on your age, your blood pressure results, and other factors. · Prostate exam. Talk to your doctor about whether you should have a blood test (called a PSA test) for prostate cancer.  Experts recommend that you discuss the benefits and risks of the test with your doctor before you decide whether to have this test.  · Diabetes. Ask your doctor whether you should have tests for diabetes. · Vision. Some experts recommend that you have yearly exams for glaucoma and other age-related eye problems starting at age 48. · Hearing. Tell your doctor if you notice any change in your hearing. You can have tests to find out how well you hear. · Colorectal cancer. Your risk for colorectal cancer gets higher as you get older. Some experts say that adults should start regular screening at age 48 and stop at age 76. Others say to start before age 48 or continue after age 76. Talk with your doctor about your risk and when to start and stop screening. · Heart attack and stroke risk. At least every 4 to 6 years, you should have your risk for heart attack and stroke assessed. Your doctor uses factors such as your age, blood pressure, cholesterol, and whether you smoke or have diabetes to show what your risk for a heart attack or stroke is over the next 10 years. · Abdominal aortic aneurysm. Ask your doctor whether you should have a test to check for an aneurysm. You may need a test if you ever smoked or if your parent, brother, sister, or child has had an aneurysm. When should you call for help? Watch closely for changes in your health, and be sure to contact your doctor if you have any problems or symptoms that concern you. Where can you learn more? Go to [email or web address]. org and sign in to your [employer] account. Enter I221 in the [facility] box to learn more about \"Well Visit, Men 48 to 72: Care Instructions. \"     If you do not have an account, please click on the \"Sign Up Now\" link. Current as of: August 22, 2019               Content Version: 12.5  © 9167-0130 Healthwise, Incorporated. Care instructions adapted under license by [facility] ([facility]).  If you have questions about a medical condition or this instruction, always ask your healthcare professional. [address] any warranty or liability for your use of this information.

## 2020-09-29 NOTE — PROGRESS NOTES
[facility] ([facility]) Physicians  Internal Medicine  Patient Encounter  Messi Galeas D.O., [facility] Preventative Physical    Chief Complaint   Patient presents with    Annual Exam       HPI-- 44 y.o. male presents today for a complete annual physical.      Medical/Surgical Histories     Past Medical History:   Diagnosis Date    Chronic idiopathic gout involving toe of left foot without tophus 10/10/2016    Depression     Hyperlipidemia     Hypertension     IBS (irritable bowel syndrome)     KOEHLER (nonalcoholic steatohepatitis) 10/17/2019    Osteoarthritis     Type 2 diabetes mellitus without complication, without long-term current use of insulin ([address] 10/10/2016    Vitamin D deficiency           Past Surgical History:   Procedure Laterality Date    COLONOSCOPY  06/21/2017    Dr. Rebecca Aguayo  06/21/2017    Dr. Nery Richard 10 MG tablet TAKE 1 TABLET BY MOUTH EVERY DAY    allopurinol (ZYLOPRIM) 100 MG tablet TAKE 1 TABLET BY MOUTH TWICE A DAY    lisinopril (PRINIVIL;ZESTRIL) 5 MG tablet TAKE 1 TABLET BY MOUTH EVERY DAY    sitaGLIPtan-metformin (JANUMET)  MG per tablet TAKE 1 TABLET BY MOUTH TWICE A DAY WITH FOOD    pravastatin (PRAVACHOL) 80 MG tablet TAKE 1 TABLET BY MOUTH EVERY DAY    celecoxib (CELEBREX) 200 MG capsule Take 200 mg by mouth daily    aspirin EC 81 MG EC tablet Take 1 tablet by mouth daily.  sildenafil (REVATIO) 20 MG tablet Take 5 tablets by mouth as needed (Erectyle dysfunction)    blood glucose test strips (GLUCOMETER ELITE TEST STRIPS) strip Use one tid to check blood sugars.   Patient uses ONE TOUCH STRIP    indomethacin (INDOCIN) 50 MG capsule Take 1 capsule by mouth 3 times daily as needed (gout attack) Take as needed for gout    ONE TOUCH ULTRA TEST strip TEST BLOOD SUGAR TWICE A DAY     No current facility-administered medications for this visit. No Known Allergies      Substance Use History     Social History     Tobacco Use    Smoking status: Never Smoker    Smokeless tobacco: Never Used   Substance Use Topics    Alcohol use: Yes     Alcohol/week: 0.0 standard drinks     Comment: occasionally    Drug use: No          Family History     Family History   Problem Relation Age of Onset    Cancer Maternal Grandfather     High Cholesterol Neg Hx     High Blood Pressure Neg Hx     Heart Disease Neg Hx               REVIEW OF SYSTEMS:    CONSTITUTIONAL:  Neg   Recent weight changes, fatigue, fever, chills or night sweats, anorexia, Sleep difficulties  EYES: Neg  Blurry vision, loss of vision, double vision, tearing, itching, eye pain. EARS:  Neg Hearing loss, tinnitus, vertigo, discharge or otalgia. NOSE:  Neg  Rhinorrhea, sneezing, itching, allergy, epistaxis, or snoring  MOUTH/THROAT:  Neg Bleeding gums, hoarseness, sore throat, dysphagia, throat infections, or dentures  RESPIRATORY:  Neg SOB ,wheeze, cough, sputum, hemoptysis. No report of + TB test.    CARDIOVASCULAR:  Neg Chest pain, palpitations, heart murmur, dyspnea on exertion, orthopnea, paroxysmal nocturnal dyspnea or edema of extremities, or claudication  GASTROINTESTINAL:  Neg   Nausea, vomiting, or diarrhea, constipation,  hematemesis, heart burn, dysphagia,change in bowel movements or stool caliber, hematochezia, melena, abdominal pain, or food intolerance. Colonoscopy: Yes,   GENITOURINARY:  Neg  Urinary frequency, hesitancy, urgency, polyuria, dysuria, hematuria, nocturia, incontinence, change in stream, genital pain or swelling, kidney stones, STD's. WILMA: Yes, PSA: Yes  HEMATOLOGIC/LYMPHATIC:  Neg  Anemia, bleeding dyscrasias, easy bruising, blood clots (DVT/PE), transfusions, or enlarged lymph nodes  MUSCULOSKELETAL:  Neg  New myalgias, bone pain, joint pain, joint swelling, low back pain, neck pain, radicular pain, or fractures.   NEUROLOGICAL:  Neg Loss of Consciousness, memeory loss or forgetfulness, confusion, difficulty concentrating, seizures, insomina, aphasia or dysarthria unilateral weakness or paresthesias, ataxia, headaches, syncope, tremor, or H/O head trauma. PSYCHIATRIC:  Neg  Depression, anxiety, personality changes, nervousness, drug or alcohol use/abuse, H/O psych counseling: No, Psychotropics: No  SKIN :  Neg  Rash, nail changes, sun burns, tattoos, change in moles, or skin color changes. ENDOCRINE:  Neg  Polydipsia,polyuria,abnormal weight changes,heat /cold intolerance, Hair changes. No H/O Thyroid disease.  + ED. Flushing with Sildenafil. Takes 100 mg with variable effects. Good Libido. Sugars are good--. Lab Results   Component Value Date    LABA1C 6.6 10/17/2019     Lab Results   Component Value Date    .7 10/17/2019           Preventive Care:    Health Maintenance   Topic Date Due    Diabetic retinal exam  03/15/1978    Hepatitis B vaccine (1 of 3 - Risk 3-dose series) 03/15/1987    Flu vaccine (1) 09/01/2020    Shingles Vaccine (1 of 2) 10/17/2020 (Originally 3/15/2018)    Diabetic foot exam  10/17/2020    A1C test (Diabetic or Prediabetic)  10/17/2020    Diabetic microalbuminuria test  10/17/2020    Lipid screen  10/17/2020    Potassium monitoring  10/17/2020    Creatinine monitoring  10/17/2020    Statin Therapy  01/13/2021    Colon cancer screen colonoscopy  06/21/2022    DTaP/Tdap/Td vaccine (2 - Td) 10/10/2026    Pneumococcal 0-64 years Vaccine  Completed    HIV screen  Completed    Hepatitis A vaccine  Aged Out    Hib vaccine  Aged Out    Meningococcal (ACWY) vaccine  Aged Out      Self-testicular exams: Yes  Sexual activity: Multiple partners, condoms 100% of the time. Last eye exam: never, OVERDUE  Exercise: Walking with work-- mail delivery. Seatbelt use: Yes  Sunscreen Use: Not regularly. Dentist: Not UTD.       Physical Exam    Vitals:    09/29/20 1103   BP: 120/74 Pulse: 62   Resp: 12   Temp: 98.7 °F (37.1 °C)   Weight: 222 lb (100.7 kg)   Height: 6' (1.829 m)     Body mass index is 30.11 kg/m². Wt Readings from Last 3 Encounters:   09/29/20 222 lb (100.7 kg)   12/17/19 233 lb 0.4 oz (105.7 kg)   10/17/19 233 lb (105.7 kg)     BP Readings from Last 3 Encounters:   09/29/20 120/74   10/17/19 120/76   11/30/18 120/72        GEN:  44 y.o. male who is in NAD, A&O. He appears stated age and well nourished. He is cooperative and pleasant. HEAD:  NC/AT, no lesions. EYES:  UCHE, EOMI, No scleral icterus or conjunctival injection or discharge. Visual fields in tact to confrontation. Funduscopic exam difficult due to constricting pupils  EARS:  EAC's clear, TM's normal.  NOSE:  Nasal cavity is clear. No mucosal congestion or discharge. Sinuses are nontender. MOUTH & THROAT:  Oral cavity is clear without mucosal lesions. Tongue is midline. Dentition is in good repair. No pharyngeal erythema or exudate. NECK:  Supple. Full ROM. Trachea is midline. No increased JVD. No thyromegaly or nodules. No masses  LYMPH: No C/SC/A/F nodes  CARDIAC:  S1S2 NL. Regular rhythm. No murmur/clicks/rubs. No ectopy. PMI is non-displaced. VASC:  Pedal pulses 2/4. Carotid upstrokes 2+. No bruits noted. PULM:  Lungs are CTA. Symmetric breath sounds noted. AP Diameter NL. GI:  Abdomen is soft and nontender. No distension. No organomegaly. No masses. No pulsatile masses. : External genitalia NL. Cystic structure above the right testicle. BREAST:  No masses  EXT:  No Cyanosis or clubbing. No edema. SKIN: Warm and dry, normal turgor, no rash or lesions of concern. Feet normal color and temperature, no large calluses, ulcers or wounds   NEURO:  Cranial nerves 2-12 are NL. Speech fluent and coherent. Strength is 5/5 in all muscle groups. No sensory deficits. No focal or lateralizing deficits. Reflexes 2/4 and symmetric.   Gait is normal.Slightly diminished vibratory sensation left foot. Monofilament testing intact both feet. MS:  No C/T/L paraspinal tenderness. No scoliosis. No joint effusions. Full joint ROM. PSYCH:  Mood and affect NL. Judgement and insight NL. Assessment/Plan:  Yoni Bustillos was seen today for annual exam.    Diagnoses and all orders for this visit:    Annual physical exam  --All care gaps identified and addressed  --Strongly encourage patient update his diabetic retinal eye exam  --All cancer screenings up-to-date  --Discussed flu vaccine. Given today  --Discussed Shingrix   -     EKG 12 Lead  -     INFLUENZA, QUADV, 3 YRS AND OLDER, IM PF, PREFILL SYR OR SDV, 0.5ML (AFLURIA QUADV, PF)  -     HIV Screen; Future  -     CBC Auto Differential; Future  -     Comprehensive Metabolic Panel; Future  -     Lipid Panel; Future  -     Hemoglobin A1C; Future  -     TSH without Reflex; Future  -     Psa screening; Future    Flu vaccine need  -     INFLUENZA, QUADV, 3 YRS AND OLDER, IM PF, PREFILL SYR OR SDV, 0.5ML (AFLURIA QUADV, PF)    Type 2 diabetes mellitus with diabetic autonomic neuropathy, without long-term current use of insulin (HCC)  -     Microalbumin / Creatinine Urine Ratio  -     Comprehensive Metabolic Panel; Future  -     Lipid Panel; Future  -     Hemoglobin A1C; Future    Erectile dysfunction, unspecified erectile dysfunction type  --Consider endocrine referral  -     tadalafil (CIALIS) 20 MG tablet; Take 1 tablet by mouth daily as needed for Erectile Dysfunction  -    Check testosterone    Type 2 diabetes mellitus with diabetic neuropathy, without long-term current use of insulin (HCC)  -     Comprehensive Metabolic Panel; Future  -     Lipid Panel; Future  -     Hemoglobin A1C; Future  -     Diabetic foot exam today    Need for shingles vaccine  -     zoster recombinant adjuvanted vaccine [facility]) 50 MCG/0.5ML SUSR injection;  Inject 0.5 mLs into the muscle See Admin Instructions 1 dose now and repeat in 2-6 months    Immunity status testing  -     Hepatitis A Antibody, Total; Future  -     Hepatitis B Surface Antibody; Future    Screening for HIV (human immunodeficiency virus)  -     HIV Screen; Future    Benign essential HTN  -     CBC Auto Differential; Future  -     Comprehensive Metabolic Panel; Future  -     Lipid Panel; Future  -     TSH without Reflex; Future    KOEHLER (nonalcoholic steatohepatitis)  -     Comprehensive Metabolic Panel; Future    Hyperlipidemia, mixed  -     Comprehensive Metabolic Panel; Future    Screening for prostate cancer  -     Psa screening; Future               Preventive plan of care for Gonsalo Peterson        9/29/2020           Preventive Measures Status       Recommendations for screening   Prostate Cancer Screen  Lab Results   Component Value Date    PSA 0.51 10/17/2019     Test recommended and ordered    Colon Cancer Screen  Last colonoscopy: 6/21/2017 Test is due every 5 years.   Next colonoscopy due in June 2022   Diabetes Screen  Glucose (mg/dL)   Date Value   10/17/2019 114 (H)    Test recommended and ordered   Cholesterol Screen  Lab Results   Component Value Date    CHOL 114 10/17/2019    TRIG 153 (H) 10/17/2019    HDL 28 (L) 10/17/2019    LDLCALC 55 10/17/2019    LDLDIRECT 122 (H) 11/21/2014    Test recommended and ordered   Hepatitis C screening: None on file Recommended for patients born between [address] and 1967--Not clinically indicated   HIV screening: 10/10/2016 Recommended for patients between the ages of 15-65 who have never been tested regardless of risk--No need to repeat at this time   Aspirin for Cardiovascular Prevention   Yes Continue daily aspirin    Recommended Immunizations    Immunization History   Administered Date(s) Administered    Influenza, Quadv, IM, PF (6 mo and older Fluzone, Flulaval, Fluarix, and 3 yrs and older Afluria) 10/20/2017, 11/30/2018, 10/17/2019, 09/29/2020    Pneumococcal Polysaccharide ([id number]) 10/21/2015    Tdap (Boostrix, Adacel) 10/10/2016

## 2020-10-22 ENCOUNTER — OFFICE VISIT (OUTPATIENT)
Dept: ORTHOPEDIC SURGERY | Age: 50
End: 2020-10-22
Payer: OTHER GOVERNMENT

## 2020-10-22 VITALS — BODY MASS INDEX: 30.07 KG/M2 | WEIGHT: 222 LBS | HEIGHT: 72 IN

## 2020-10-22 DIAGNOSIS — E11.43 TYPE 2 DIABETES MELLITUS WITH DIABETIC AUTONOMIC NEUROPATHY, WITHOUT LONG-TERM CURRENT USE OF INSULIN (HCC): ICD-10-CM

## 2020-10-22 DIAGNOSIS — E11.40 TYPE 2 DIABETES MELLITUS WITH DIABETIC NEUROPATHY, WITHOUT LONG-TERM CURRENT USE OF INSULIN (HCC): ICD-10-CM

## 2020-10-22 DIAGNOSIS — I10 BENIGN ESSENTIAL HTN: ICD-10-CM

## 2020-10-22 DIAGNOSIS — Z12.5 SCREENING FOR PROSTATE CANCER: ICD-10-CM

## 2020-10-22 DIAGNOSIS — Z00.00 ANNUAL PHYSICAL EXAM: ICD-10-CM

## 2020-10-22 DIAGNOSIS — Z01.84 IMMUNITY STATUS TESTING: ICD-10-CM

## 2020-10-22 DIAGNOSIS — E78.2 HYPERLIPIDEMIA, MIXED: ICD-10-CM

## 2020-10-22 DIAGNOSIS — Z11.4 SCREENING FOR HIV (HUMAN IMMUNODEFICIENCY VIRUS): ICD-10-CM

## 2020-10-22 DIAGNOSIS — K75.81 NASH (NONALCOHOLIC STEATOHEPATITIS): ICD-10-CM

## 2020-10-22 DIAGNOSIS — N52.9 ERECTILE DYSFUNCTION, UNSPECIFIED ERECTILE DYSFUNCTION TYPE: ICD-10-CM

## 2020-10-22 LAB
A/G RATIO: 1.4 (ref 1.1–2.2)
ALBUMIN SERPL-MCNC: 4.6 G/DL (ref 3.4–5)
ALP BLD-CCNC: 75 U/L (ref 40–129)
ALT SERPL-CCNC: 32 U/L (ref 10–40)
ANION GAP SERPL CALCULATED.3IONS-SCNC: 11 MMOL/L (ref 3–16)
AST SERPL-CCNC: 32 U/L (ref 15–37)
BASOPHILS ABSOLUTE: 0 K/UL (ref 0–0.2)
BASOPHILS RELATIVE PERCENT: 0.4 %
BILIRUB SERPL-MCNC: 0.5 MG/DL (ref 0–1)
BUN BLDV-MCNC: 17 MG/DL (ref 7–20)
CALCIUM SERPL-MCNC: 10.1 MG/DL (ref 8.3–10.6)
CHLORIDE BLD-SCNC: 96 MMOL/L (ref 99–110)
CHOLESTEROL, TOTAL: 117 MG/DL (ref 0–199)
CO2: 28 MMOL/L (ref 21–32)
CREAT SERPL-MCNC: 1.1 MG/DL (ref 0.9–1.3)
EOSINOPHILS ABSOLUTE: 0.1 K/UL (ref 0–0.6)
EOSINOPHILS RELATIVE PERCENT: 0.8 %
GFR AFRICAN AMERICAN: >60
GFR NON-AFRICAN AMERICAN: >60
GLOBULIN: 3.3 G/DL
GLUCOSE BLD-MCNC: 101 MG/DL (ref 70–99)
HBV SURFACE AB TITR SER: <3.5 MIU/ML
HCT VFR BLD CALC: 49.7 % (ref 40.5–52.5)
HDLC SERPL-MCNC: 36 MG/DL (ref 40–60)
HEMOGLOBIN: 16.2 G/DL (ref 13.5–17.5)
LDL CHOLESTEROL CALCULATED: 52 MG/DL
LYMPHOCYTES ABSOLUTE: 2.8 K/UL (ref 1–5.1)
LYMPHOCYTES RELATIVE PERCENT: 28.5 %
MCH RBC QN AUTO: 30.4 PG (ref 26–34)
MCHC RBC AUTO-ENTMCNC: 32.7 G/DL (ref 31–36)
MCV RBC AUTO: 93.1 FL (ref 80–100)
MONOCYTES ABSOLUTE: 0.6 K/UL (ref 0–1.3)
MONOCYTES RELATIVE PERCENT: 5.8 %
NEUTROPHILS ABSOLUTE: 6.4 K/UL (ref 1.7–7.7)
NEUTROPHILS RELATIVE PERCENT: 64.5 %
PDW BLD-RTO: 13.2 % (ref 12.4–15.4)
PLATELET # BLD: 232 K/UL (ref 135–450)
PMV BLD AUTO: 10.2 FL (ref 5–10.5)
POTASSIUM SERPL-SCNC: 4.6 MMOL/L (ref 3.5–5.1)
PROSTATE SPECIFIC ANTIGEN: 0.55 NG/ML (ref 0–4)
RBC # BLD: 5.34 M/UL (ref 4.2–5.9)
SODIUM BLD-SCNC: 135 MMOL/L (ref 136–145)
TOTAL PROTEIN: 7.9 G/DL (ref 6.4–8.2)
TRIGL SERPL-MCNC: 146 MG/DL (ref 0–150)
TSH SERPL DL<=0.05 MIU/L-ACNC: 1.86 UIU/ML (ref 0.27–4.2)
VLDLC SERPL CALC-MCNC: 29 MG/DL
WBC # BLD: 9.9 K/UL (ref 4–11)

## 2020-10-22 PROCEDURE — 99213 OFFICE O/P EST LOW 20 MIN: CPT | Performed by: FAMILY MEDICINE

## 2020-10-22 RX ORDER — METHYLPREDNISOLONE 4 MG/1
TABLET ORAL
Qty: 21 KIT | Refills: 0 | Status: SHIPPED | OUTPATIENT
Start: 2020-10-22 | End: 2021-03-10 | Stop reason: ALTCHOICE

## 2020-10-22 NOTE — PROGRESS NOTES
Chief Complaint  Knee Pain ([facility] BILATERAL KNEES)    followup recurrent bilateral knee pain with substantial patellofemoral arthropathy with underlying knee osteoarthritis with worsening pain    Beatriz Castellanos is a 44 y.o. male Christine Retana is a 40-year-old white male type II diabetic who is a walking  for the [employer]. He is being seen back today for 6 month follow-up on his bilateral knee symptomatic chondromalacia patella with underlying knee osteoarthritis. He last completed his Visco supplementation on 10/30/2018 which did provide fairly good pain relief for him but over the last few weeks with the weather change he has become a little bit more sore primarily anteriorly involving his knees. .      History of Present Illness for Follow Up Patient:      Christine Retana is being seen in follow up today for [facility] claim for chronic bilateral knee pain. The patient rates their current pain as a 5-6 out of 10 on the pain scale. Activities aggravating current symptoms include walking long durations and after sitting for long periods of time. Activities relieving current symptoms include rest. Conservative treatment to date includes: rest, ice, NSAID X2 100 mg daily, knee bracing, home exercises, cortisone injections and Euflexxa most recently completed on 5/21/2019. Denies locking catching or true instability symptoms. He has continue with his exercise program does utilize his knee braces periodically. He is continued to lose weight and has lost about 50 pounds. Denies locking catching or instability symptoms. Apple Prieto was last seen in the office on 12/17/2019 for follow-up on his bilateral knee significant osteoarthritis with patellofemoral arthropathy. His last round of viscosupplementation was completed on 5/21/2019 and provided very good pain relief for him.   He is continue with his anti-inflammatories most.  Increasing his home-based exercise program.  He is continue to lose weight and induration. Vascular: Examination reveals no swelling or calf tenderness. Peripheral pulses are palpable and 2+. Neurological: The patient has good coordination. There is no weakness or sensory deficit. Knee Examination  Inspection:  He is in slight varus and has trace  effusions bilaterally. There is marked patellofemoral crepitation. No high-grade malalignment.     Palpation: He has recurrent tenderness to palpation over the medial joint line and also over the retropatellar joint. He does have a mildly to moderately positive grind testing. This is certainly more prominent than last week. He is most tender along the anterior 3rd medial joint lines. He rates this at about of 5-6 out of 10.     Rang of Motion:  Range of motion -5-120 bilaterally.     Strength:  Strength testing 4+ out of 5 knee flexion extension.     Special Tests:  He is a mildly to moderately positive grind test and pain with medial Helen's. I do not feel high-grade click but certainly this worsens his pain bilaterally. This is remarkable for crepitation than actual pop or click. No evidence of instability.     Skin: There are no rashes, ulcerations or lesions. Distal neurovascular exam is intact.     Gait: Improved gait.     Reflex reflexes are preserved her     Additional Comments:      Additional Examinations:  Right Lower Extremity: Examination of the right lower extremity does not show any tenderness, deformity or injury. Range of motion is unremarkable. There is no gross instability. There are no rashes, ulcerations or lesions. Strength and tone are normal.  Left Lower Extremity: Examination of the left lower extremity does not show any tenderness, deformity or injury. Range of motion is unremarkable. There is no gross instability. There are no rashes, ulcerations or lesions. Strength and tone are normal.  Lower Back: Examination of the lower back does not show any tenderness, deformity or injury.   Range of motion placed in this encounter. Treatment Plan:  Treatment options were discussed with Yokasta Jernigan. Clinically at this point he Is feeling worse compared to his last visit. His knees have become a little bit more achy recently but reports no interim history of actual injury or no activity prior to becoming symptomatic. His worsening pain recently, we did place him on a Medrol Dosepak to be followed by continuing with his chronic Celebrex 200 mg daily. He has done an excellent job with weight loss and has lost about 50 pounds. He does utilize his knee braces periodically. We will put through a request via [facility]. for bilateral knee intra-articular steroid injections and Visco supplementation with Euflexxa once again. He is typically done very well with this. He will continue with his home-based exercises empiric use of his knee braces. He will contact us in the interim with questions or concerns. He will monitor for transient hyperglycemia with his Medrol pack and eventually steroid injections. He will call us with questions or concerns. 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 This dictation was performed with a verbal recognition program (DRAGON) and it was checked for errors. It is possible that there are still dictated errors within this office note. If so, please bring any errors to my attention for an addendum. All efforts were made to ensure that this office note is accurate.

## 2020-10-23 LAB
ESTIMATED AVERAGE GLUCOSE: 122.6 MG/DL
HBA1C MFR BLD: 5.9 %
HIV AG/AB: NORMAL
HIV ANTIGEN: NORMAL
HIV-1 ANTIBODY: NORMAL
HIV-2 AB: NORMAL

## 2020-10-24 LAB
HAV AB SERPL IA-ACNC: NEGATIVE
TESTOSTERONE TOTAL: 374 NG/DL (ref 220–1000)

## 2020-11-03 ENCOUNTER — OFFICE VISIT (OUTPATIENT)
Dept: ORTHOPEDIC SURGERY | Age: 50
End: 2020-11-03
Payer: OTHER GOVERNMENT

## 2020-11-03 VITALS — WEIGHT: 222 LBS | TEMPERATURE: 96.9 F | HEIGHT: 72 IN | BODY MASS INDEX: 30.07 KG/M2

## 2020-11-03 PROCEDURE — 99214 OFFICE O/P EST MOD 30 MIN: CPT | Performed by: FAMILY MEDICINE

## 2020-11-03 PROCEDURE — 20610 DRAIN/INJ JOINT/BURSA W/O US: CPT | Performed by: FAMILY MEDICINE

## 2020-11-03 RX ORDER — BUPIVACAINE HYDROCHLORIDE 2.5 MG/ML
4 INJECTION, SOLUTION INFILTRATION; PERINEURAL ONCE
Status: COMPLETED | OUTPATIENT
Start: 2020-11-03 | End: 2020-11-03

## 2020-11-03 RX ORDER — LIDOCAINE HYDROCHLORIDE 10 MG/ML
2 INJECTION, SOLUTION INFILTRATION; PERINEURAL ONCE
Status: COMPLETED | OUTPATIENT
Start: 2020-11-03 | End: 2020-11-03

## 2020-11-03 RX ORDER — BETAMETHASONE SODIUM PHOSPHATE AND BETAMETHASONE ACETATE 3; 3 MG/ML; MG/ML
24 INJECTION, SUSPENSION INTRA-ARTICULAR; INTRALESIONAL; INTRAMUSCULAR; SOFT TISSUE ONCE
Status: COMPLETED | OUTPATIENT
Start: 2020-11-03 | End: 2020-11-03

## 2020-11-03 RX ADMIN — LIDOCAINE HYDROCHLORIDE 2 ML: 10 INJECTION, SOLUTION INFILTRATION; PERINEURAL at 09:02

## 2020-11-03 RX ADMIN — BETAMETHASONE SODIUM PHOSPHATE AND BETAMETHASONE ACETATE 24 MG: 3; 3 INJECTION, SUSPENSION INTRA-ARTICULAR; INTRALESIONAL; INTRAMUSCULAR; SOFT TISSUE at 09:00

## 2020-11-03 RX ADMIN — BUPIVACAINE HYDROCHLORIDE 10 MG: 2.5 INJECTION, SOLUTION INFILTRATION; PERINEURAL at 09:01

## 2020-11-03 NOTE — PROGRESS NOTES
lose weight and has lost 50 pounds and has maintained this. Distance walking going up and down stairs can produce pain in the range of 6-7 out of 10 and he does have discomfort which is more achy in nature anteriorly and anterior medially with weather changes. Stairclimbing is also problematic. He does try to perform his home-based exercises. Apparently be did try to admit for repeat steroid injections and viscosupplementation earlier this year however this was denied because [employer]'s [employer]. apparently had inadvertently closed his claim due to the coronavirus pandemic. It took him 3 days to get that straightened out he is seen today for evaluation complaining of increasing pain and discomfort involving his knees. He is also having pain at night episodically. At baseline his symptoms are more achy but can be more sharp towards in the day or if he is doing lots of stairs. Michelle bennett last seen in the office on 10/22/2020 was continued on conservative treatment for his underlying significant bilateral knee patellofemoral arthropathy with osteoarthritis. As noted previously for some reason tried to submit for repeat steroid injections and viscosupplementation she has helped him substantially for years but he will be secondary to the coronavirus outbreak, for some reason his claim was closed. He is in the process of getting this reopened and we have gotten approval for the steroid injections however there has been some underlying difficulty in getting approval for viscosupplementation once again. We have been doing this for many years and this is helped him substantially. He has continued with his weight loss and has lost over 50 to 60 pounds and has maintained this. He is still having pain with repetitive stair climbing and distance walking and he is a walking .   He does have crepitation and popping but does work on his home-based exercise program and has continued with his anti-inflammatories. Attest: I have reviewed and attest the documentation of the HPI documented by my . I will make any changes if necessary. Enc Date: 11/3/2020  Time: 8:55 AM  Provider: Matty Hendrickson MD        Social History     Tobacco Use    Smoking status: Never Smoker    Smokeless tobacco: Never Used   Substance Use Topics    Alcohol use: Yes     Alcohol/week: 0.0 standard drinks     Comment: occasionally    Drug use: No        Review of Systems  Pertinent items are noted in HPI  Review of systems reviewed from Patient History Form dated on 12/17/2019 and available in the patient's chart under the Media tab. Vital Signs     Temp 96.9 °F (36.1 °C) (Temporal)   Ht 6' (1.829 m)   Wt 222 lb (100.7 kg)   BMI 30.11 kg/m²       General Exam:   Constitutional: Patient is adequately groomed with no evidence of malnutrition  DTRs: Deep tendon reflexes are intact  Mental Status: The patient is oriented to time, place and person. The patient's mood and affect are appropriate. Lymphatic: The lymphatic examination bilaterally reveals all areas to be without enlargement or induration. Vascular: Examination reveals no swelling or calf tenderness. Peripheral pulses are palpable and 2+. Neurological: The patient has good coordination. There is no weakness or sensory deficit. Knee Examination  Inspection:  He is in slight varus and has trace  effusions bilaterally. There is marked patellofemoral crepitation. No high-grade malalignment.     Palpation: He has recurrent tenderness to palpation over the medial joint line and also over the retropatellar joint. He does have a mildly to moderately positive grind testing. This is certainly more prominent than last week. He is most tender along the anterior 3rd medial joint lines.   He rates this at about of 5-6 out of 10.     Rang of Motion:  Range of motion -5-120 bilaterally.     Strength:  Strength testing 4+ out of 5 knee flexion extension.     Special Tests:  He is a mildly to moderately positive grind test and pain with medial Helen's. I do not feel high-grade click but certainly this worsens his pain bilaterally. This is remarkable for crepitation than actual pop or click. No evidence of instability.     Skin: There are no rashes, ulcerations or lesions. Distal neurovascular exam is intact.     Gait: Improved gait.     Reflex reflexes are preserved her     Additional Comments:      Additional Examinations:  Right Lower Extremity: Examination of the right lower extremity does not show any tenderness, deformity or injury. Range of motion is unremarkable. There is no gross instability. There are no rashes, ulcerations or lesions. Strength and tone are normal.  Left Lower Extremity: Examination of the left lower extremity does not show any tenderness, deformity or injury. Range of motion is unremarkable. There is no gross instability. There are no rashes, ulcerations or lesions. Strength and tone are normal.  Lower Back: Examination of the lower back does not show any tenderness, deformity or injury. Range of motion is unremarkable. There is no gross instability. There are no rashes, ulcerations or lesions. Strength and tone are normal.  Examination of the bilateral hip reveals intact skin. The patient demonstrates full painless range of motion with regards to flexion, abduction, internal and external rotation. There is not tenderness about the greater trochanter. There is a negative straight leg raise against resistance. Strength is 5/5 thorough out all planes.        Diagnostic Test Findings: Bilateral knee MRIs performed 5/5/2017 as listed above     Right knee MRI 5/5/2017  CONCLUSION:    1. Grade 3-4 chondromalacia of lateral facet of the patella with foci of osteochondral erosion    and chondral fissuring. 2. No meniscal tear or acute ligamentous injury.     3. Mild chronic lateral capsulitis and popliteal I encouraged him to check with his  regarding approval for his Euflexxa. We have been doing this for many years and he has got an excellent response to this and given his job as a walking , I think it is in his medical best interest and necessary to pursue viscosupplementation once again. He is not ready for knee surgery. Its been over 3 years since her last MRI which did show significant patellofemoral arthropathy/osteoarthritis but with also underlying knee osteoarthritis. We will also to amend underlying the osteoarthritis to his claim. Icing and activity modification as well as continuation of his home-based exercise program was discussed. 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 He will contact us in the interim with questions or concerns after speaking with his Workmen's [employer]. representative. Hopefully we will be able to pursue viscosupplementation at that time. This dictation was performed with a verbal recognition program (DRAGON) and it was checked for errors. It is possible that there are still dictated errors within this office note. If so, please bring any errors to my attention for an addendum. All efforts were made to ensure that this office note is accurate.

## 2020-12-10 NOTE — TELEPHONE ENCOUNTER
Last appointment: 9/29/2020  Next appointment: Visit date not found                                Due back 1/29/21  Last refill:

## 2020-12-11 RX ORDER — EMPAGLIFLOZIN 10 MG/1
TABLET, FILM COATED ORAL
Qty: 90 TABLET | Refills: 3 | Status: SHIPPED | OUTPATIENT
Start: 2020-12-11 | End: 2021-12-14

## 2020-12-11 RX ORDER — PRAVASTATIN SODIUM 80 MG/1
TABLET ORAL
Qty: 90 TABLET | Refills: 3 | Status: SHIPPED | OUTPATIENT
Start: 2020-12-11 | End: 2022-09-30 | Stop reason: ALTCHOICE

## 2021-03-10 ENCOUNTER — OFFICE VISIT (OUTPATIENT)
Dept: INTERNAL MEDICINE CLINIC | Age: 51
End: 2021-03-10
Payer: COMMERCIAL

## 2021-03-10 VITALS
RESPIRATION RATE: 12 BRPM | HEART RATE: 66 BPM | WEIGHT: 222 LBS | TEMPERATURE: 98.2 F | DIASTOLIC BLOOD PRESSURE: 72 MMHG | BODY MASS INDEX: 30.07 KG/M2 | HEIGHT: 72 IN | SYSTOLIC BLOOD PRESSURE: 120 MMHG

## 2021-03-10 DIAGNOSIS — E78.5 DYSLIPIDEMIA: ICD-10-CM

## 2021-03-10 DIAGNOSIS — E11.40 TYPE 2 DIABETES MELLITUS WITH DIABETIC NEUROPATHY, WITHOUT LONG-TERM CURRENT USE OF INSULIN (HCC): Primary | ICD-10-CM

## 2021-03-10 DIAGNOSIS — N52.9 ERECTILE DYSFUNCTION, UNSPECIFIED ERECTILE DYSFUNCTION TYPE: ICD-10-CM

## 2021-03-10 DIAGNOSIS — K75.81 NASH (NONALCOHOLIC STEATOHEPATITIS): ICD-10-CM

## 2021-03-10 DIAGNOSIS — E11.43 TYPE 2 DIABETES MELLITUS WITH DIABETIC AUTONOMIC NEUROPATHY, WITHOUT LONG-TERM CURRENT USE OF INSULIN (HCC): ICD-10-CM

## 2021-03-10 DIAGNOSIS — I10 BENIGN ESSENTIAL HTN: ICD-10-CM

## 2021-03-10 PROCEDURE — 99214 OFFICE O/P EST MOD 30 MIN: CPT | Performed by: INTERNAL MEDICINE

## 2021-03-10 RX ORDER — AVANAFIL 200 MG/1
1 TABLET ORAL PRN
Qty: 3 TABLET | Refills: 0 | COMMUNITY
Start: 2021-03-10 | End: 2022-05-06 | Stop reason: ALTCHOICE

## 2021-03-10 ASSESSMENT — PATIENT HEALTH QUESTIONNAIRE - PHQ9
SUM OF ALL RESPONSES TO PHQ QUESTIONS 1-9: 0
SUM OF ALL RESPONSES TO PHQ9 QUESTIONS 1 & 2: 0
2. FEELING DOWN, DEPRESSED OR HOPELESS: 0
SUM OF ALL RESPONSES TO PHQ QUESTIONS 1-9: 0

## 2021-03-10 NOTE — PROGRESS NOTES
[facility]) Physicians  Internal Medicine  Patient Encounter  Miguel Diana D.O., [facility]         Chief Complaint   Patient presents with   Philomena Range    Medication Check         HPI: 44 y.o. male who again has been noncompliant with routine follow-up regarding his chronic medical problems is seen today requesting his checkup and medication review. He is bitterly complaining of severe ED. Cialis at 20 mg does not help much. Sildenafil at 100 mg does not help. In all likelihood this is reflective of autonomic dysfunction from diabetic neuropathy. Patient has difficulty acquiring and maintaining erection successful enough for intercourse or masturbation. Health maintenance items overdue:  Diabetic retinal eye exam  Hepatitis B vaccine  Shingles vaccine  Covid vaccine      Diabetes Mellitus Type II, Follow-up--   Lab Results   Component Value Date    LABA1C 5.9 10/22/2020     Lab Results   Component Value Date    .6 10/22/2020      Previous history---->  his A1c in October 2017 was 9.0%. His highest A1c was in 2014 at 11.3%. Patient was able to make significant changes and was able to get his A1c down tremendously. Back in October his A1c was down to 5.9% indicating superb control of his diabetes  Patient denies any symptoms suggestive of glucose toxicity such as polyuria polydipsia. Has had no blurry vision. Has had no significant weight changes since his last visit. Sugars-- 110-130. U. Microalbumin/Cr--9/29/2020  Last Eye exam-- Overdue  + ACEi-- Lisinopril  Complications-- None. Insulin Treated? No.  + ASA  + Statin  Last LDL76  Foot exam9/29/2020    Hyperlipidemia:    Lab Results   Component Value Date    LDLCALC 52 10/22/2020     Patient indicates that he has been taking his pravastatin as prescribed. He denies any new side effects from the medication such as muscle aches, muscle cramping, or muscle weakness. HTN-- He does not check BP at home.   He denies HA's, dizziness, swelling. He denies CP, SOB. KOEHLER-- Liver enzymes had gotten better with weight loss. Gout-- He had a couple of flares in 2020. He is on Allopurinol. He will have [employer] paperwork. OA-- he has OA in both knees. He is seeing Dr. Germaine Arndt. This problem is under [employer]. Past Medical History:   Diagnosis Date    Chronic idiopathic gout involving toe of left foot without tophus 10/10/2016    Depression     Hyperlipidemia     Hypertension     IBS (irritable bowel syndrome)     KOEHLER (nonalcoholic steatohepatitis) 10/17/2019    Osteoarthritis     Type 2 diabetes mellitus without complication, without long-term current use of insulin (HCC) 10/10/2016    Vitamin D deficiency          MEDICATIONS:    Medication Sig   JARDIANCE 10 MG tablet TAKE 1 TABLET BY MOUTH EVERY DAY   pravastatin (PRAVACHOL) 80 MG tablet TAKE 1 TABLET BY MOUTH EVERY DAY   tadalafil (CIALIS) 20 MG tablet Take 1 tablet by mouth daily as needed for Erectile Dysfunction   allopurinol (ZYLOPRIM) 100 MG tablet TAKE 1 TABLET BY MOUTH TWICE A DAY   lisinopril (PRINIVIL;ZESTRIL) 5 MG tablet TAKE 1 TABLET BY MOUTH EVERY DAY   sildenafil (REVATIO) 20 MG tablet Take 5 tablets by mouth as needed (Erectyle dysfunction)   sitaGLIPtan-metformin (JANUMET)  MG per tablet TAKE 1 TABLET BY MOUTH TWICE A DAY WITH FOOD   celecoxib (CELEBREX) 200 MG capsule Take 200 mg by mouth daily   aspirin EC 81 MG EC tablet Take 1 tablet by mouth daily. zoster recombinant adjuvanted vaccine [facility]) 50 MCG/0.5ML SUSR injection Inject 0.5 mLs into the muscle See Admin Instructions 1 dose now and repeat in 2-6 months   blood glucose test strips (GLUCOMETER ELITE TEST STRIPS) strip Use one tid to check blood sugars.   Patient uses ONE TOUCH STRIP   indomethacin (INDOCIN) 50 MG capsule Take 1 capsule by mouth 3 times daily as needed (gout attack) Take as needed for gout   ONE TOUCH ULTRA TEST strip TEST BLOOD SUGAR TWICE A DAY          Review of Systems -as per HPI        OBJECTIVE:  Vitals:    03/10/21 0938   BP: 120/72   Pulse: 66   Resp: 12   Temp: 98.2 °F (36.8 °C)   Weight: 222 lb (100.7 kg)   Height: 6' (1.829 m)     Body mass index is 30.11 kg/m². Wt Readings from Last 3 Encounters:   03/10/21 222 lb (100.7 kg)   11/03/20 222 lb (100.7 kg)   10/22/20 222 lb (100.7 kg)     BP Readings from Last 3 Encounters:   03/10/21 120/72   09/29/20 120/74   10/17/19 120/76      Down 22# since 10/2017. GEN: NAD, A&O, Non-toxic, Obese  HEENT: NC/AT, CANDY, Anicteric. Oral cavity Clear without lesions,  TM's NL. Throat is NL. NECK: Supple. No thyromegaly or nodules. No soft tissue masses. No JVD. LYMPH: No C/SC nodes. CV: Regular rhythm. Rate normal.  No murmurs, No ectopy. PULM: CTA  EXT: No edema. GI: Abdomen is soft, NT, BS+, No hepatomegaly. NEURO: No focal or lateralizing deficits. Monofilament testing intact both feet. Vibratory sensation in both feet. VASC:  No carotid bruits. Pedal Pulses symmetric  SKIN:  No rashes. Feet normal color and temperature, no large calluses, ulcers or wounds   MS: No synovitis. ASSESSMENT/PLAN:    1. Type 2 diabetes mellitus with diabetic neuropathy, without long-term current use of insulin (HCC)  Diabetes is under excellent control based on reported home blood sugar readings and last A1c  Repeat lab  Patient is up-to-date on eye examination and foot exam as well as U. MALB/Cr   - CBC Auto Differential; Future  - Comprehensive Metabolic Panel; Future  - Lipid Panel; Future  - Hemoglobin A1C; Future    2. Type 2 diabetes mellitus with diabetic autonomic neuropathy, without long-term current use of insulin (HCC)  As above  Continue lifestyle modification.  - CBC Auto Differential; Future  - Comprehensive Metabolic Panel; Future  - Lipid Panel; Future  - Hemoglobin A1C; Future    3. Benign essential HTN  Blood pressure is well controlled  Continue lisinopril.   The Mynor Arreguin is probably providing some help with his blood pressure.  - CBC Auto Differential; Future  - Comprehensive Metabolic Panel; Future    4. KOEHLER (nonalcoholic steatohepatitis)  Condition stability and control are uncertain. Due for lab  Continue a low-carb and low-fat diet along with regular exercise. - Comprehensive Metabolic Panel; Future    5. Dyslipidemia  Condition stability and control are uncertain  Due for lab  Continue pravastatin as part of his overall cardiovascular disease risk reduction strategy. Lifestyle approach  - Comprehensive Metabolic Panel; Future  - Lipid Panel; Future    6. Erectile dysfunction, unspecified erectile dysfunction type  Addition remains uncontrolled  Patient reports that this is his biggest concern  Discontinue the Cialis and sildenafil  Trial of Stendra  Referral to urology for alternative options  Recheck free and total testosterone level  - Avanafil (STENDRA) 200 MG TABS; Take 1 tablet by mouth as needed (ED)  Dispense: 3 tablet;  Refill: 0  - AFL - Tolu Pennington DO, [facility], [facility] [place]  - Testosterone, free, total; Future

## 2021-03-10 NOTE — PATIENT INSTRUCTIONS
TO Do List:    #1 Please schedule eye exam as soon as possible. You can see Optometry or Ophthalmology    #2 focus on getting your Covid vaccine    #3 trial of Stendra 200 mg as needed for the erectile dysfunction    #4 referral to urology for further consultation  Patient Education        Diabetic Neuropathy: Care Instructions  Your Care Instructions     When you have diabetes, your blood sugar level may get too high. Over time, high blood sugar levels can damage nerves. This is called diabetic neuropathy. Nerve damage can cause pain, burning, tingling, and numbness and may leave you feeling weak. The feet are often affected. When you have nerve damage in your feet, you cannot feel your feet and toes as well as normal and may not notice cuts or sores. Even a small injury can lead to a serious infection. It is very important that you follow your doctor's advice on foot care. Sometimes diabetes damages nerves that help the body function. If this happens, your blood pressure, sweating, digestion, and urination might be affected. Your doctor may give you a target blood sugar level that is higher or lower than you are used to. Try to keep your blood sugar very close to this target level to prevent more damage. Follow-up care is a key part of your treatment and safety. Be sure to make and go to all appointments, and call your doctor if you are having problems. It's also a good idea to know your test results and keep a list of the medicines you take. How can you care for yourself at home? · Take your medicines exactly as prescribed. Call your doctor if you think you are having a problem with your medicine. It is very important that you take your insulin or diabetes pills as your doctor tells you. · Try to keep blood sugar at your target level. ? Eat a variety of healthy foods, with carbohydrate spread out in your meals. A dietitian can help you plan meals.   ? Try to get at least 30 minutes of exercise on most days.  ? Check your blood sugar as many times each day as your doctor recommends. · Take and record your blood pressure at home if your doctor tells you to. Learn the importance of the two measures of blood pressure (such as 130 over 80, or 130/80). To take your blood pressure at home:  ? Ask your doctor to check your blood pressure monitor to be sure it is accurate and the cuff fits you. Also ask your doctor to watch you to make sure that you are using it right. ? Do not use medicine known to raise blood pressure (such as some nasal decongestant sprays) before taking your blood pressure. ? Avoid taking your blood pressure if you have just exercised or are nervous or upset. Rest at least 15 minutes before you take a reading. · Take pain medicines exactly as directed. ? If the doctor gave you a prescription medicine for pain, take it as prescribed. ? If you are not taking a prescription pain medicine, ask your doctor if you can take an over-the-counter medicine. · Do not smoke. Smoking can increase your chance for a heart attack or stroke. If you need help quitting, talk to your doctor about stop-smoking programs and medicines. These can increase your chances of quitting for good. · Limit alcohol to 2 drinks a day for men and 1 drink a day for women. Too much alcohol can cause health problems. · Eat small meals often, rather than 2 or 3 large meals a day. To care for your feet  · Prevent injury by wearing shoes at all times, even when you are indoors. · Do foot care as part of your daily routine. Wash your feet and then rub lotion on your feet, but not between your toes. Use a handheld mirror or magnifying mirror to inspect your feet for blisters, cuts, cracks, or sores. · Have your toenails trimmed and filed straight across. · Wear shoes and socks that fit well. Soft shoes that have good support and that fit well (such as tennis shoes) are best for your feet.   · Check your shoes for any loose objects or rough edges before you put them on. · Ask your doctor to check your feet during each visit. Your doctor may notice a foot problem you have missed. · Get early treatment for any foot problem, even a minor one. When should you call for help? Call your doctor now or seek immediate medical care if:    · You have symptoms of infection, such as:  ? Increased pain, swelling, warmth, or redness. ? Red streaks leading from the area. ? Pus draining from the area. ? A fever.     · You have new or worse numbness, pain, or tingling in any part of your body. Watch closely for changes in your health, and be sure to contact your doctor if:    · You have a new problem with your feet, such as:  ? A new sore or ulcer. ? A break in the skin that is not healing after several days. ? Bleeding corns or calluses. ? An ingrown toenail.     · You do not get better as expected. Where can you learn more? Go to https://[email or web address].Veezeon. org and sign in to your [email or web address] account. Enter G592 in the [employer] box to learn more about \"Diabetic Neuropathy: Care Instructions. \"     If you do not have an account, please click on the \"Sign Up Now\" link. Current as of: December 20, 2019               Content Version: 12.6  © 4255-3171 [employer], [employer]. Care instructions adapted under license by [facility] ([facility]). If you have questions about a medical condition or this instruction, always ask your healthcare professional. [place] [address] any warranty or liability for your use of this information. Patient Education        Erection Problems: Care Instructions  Overview     A man has erection problems when he routinely can't get or keep an erection that allows satisfactory sex. He may not be able to have an erection at any time. Or he may not be able to have one that is firm enough or lasts long enough to complete intercourse. The problem is also called erectile dysfunction (ED).   It's not the same as having trouble getting an erection now and then. That's common. It happens to most men at some time. Erection problems can be caused by problems with the blood vessels, nerves, or hormones. They can be caused by diabetes, heart disease, and injuries. Nerve problems also can cause them. Medicines, alcohol, and tobacco also can cause problems. So can depression, stress, grief, or relationship problems. Follow-up care is a key part of your treatment and safety. Be sure to make and go to all appointments, and call your doctor if you are having problems. It's also a good idea to know your test results and keep a list of the medicines you take. How can you care for yourself at home? Lifestyle    · Limit alcohol. Have no more than 2 drinks a day.     · Do not smoke. Smoking makes it harder for the blood vessels in the penis to relax and let blood flow in. If you need help quitting, talk to your doctor about stop-smoking programs and medicines. These can increase your chances of quitting for good.     · Do not use cocaine, heroin, or other illegal drugs.     · Try to reduce stress.     · Give yourself time to adjust to change. Changes in your job, family, relationships, home life, and other areas can cause stress. And stress can cause erection problems. Work with your partner    · Don't assume that you know what your partner likes when it comes to sex. You may be wrong. Talk about what each of you does and does not enjoy.     · Make time outside of the bedroom to talk about your sex life. If you avoid sex because you are afraid of having erection problems, your partner may worry that you are no longer interested.     · If you and your partner have trouble talking about sex, see a therapist. They may help you talk about it. Reading books with your partner about sexual health may also help.     · Relax. Take time for more foreplay. Worrying about your erections may only make things worse.    Medicines    · Tell your doctor about all the medicines that you take. ? Some medicines can cause erection problems. ? Some medicines can have dangerous interactions with medicines that are prescribed for erection problems. These include over-the-counter medicines and herbal products.     · Be safe with medicines. Take your medicines exactly as prescribed. Call your doctor if you think you are having a problem with your medicine.     · Talk to your doctor about trying a medicine to help you keep an erection. This could be a medicine like sildenafil (such as Viagra), tadalafil (such as Cialis), or vardenafil (such as Levitra). If you have a heart problem, ask your doctor if these are safe for you. Do not take these medicines if you take nitroglycerin or other nitrate medicine. When should you call for help? Call your doctor now or seek immediate medical care if:    · You took a medicine for erection problems and you have an erection that lasts longer than 3 hours. Watch closely for changes in your health, and be sure to contact your doctor if you have any problems. Where can you learn more? Go to https://Comuto.[email or web address] org and sign in to your Akampus account. Enter [phone] [id number] [phone] in the [facility] box to learn more about \"Erection Problems: Care Instructions. \"     If you do not have an account, please click on the \"Sign Up Now\" link. Current as of: February 11, 2020               Content Version: 12.6  © 2103-7875 [employer], [employer]. Care instructions adapted under license by [facility] ([facility]). If you have questions about a medical condition or this instruction, always ask your healthcare professional. [place] [address] any warranty or liability for your use of this information.

## 2021-04-09 DIAGNOSIS — N52.9 ERECTILE DYSFUNCTION, UNSPECIFIED ERECTILE DYSFUNCTION TYPE: ICD-10-CM

## 2021-04-09 RX ORDER — TADALAFIL 20 MG/1
20 TABLET ORAL DAILY PRN
Qty: 10 TABLET | Refills: 3 | Status: SHIPPED | OUTPATIENT
Start: 2021-04-09 | End: 2022-05-06 | Stop reason: ALTCHOICE

## 2021-05-11 ENCOUNTER — OFFICE VISIT (OUTPATIENT)
Dept: ORTHOPEDIC SURGERY | Age: 51
End: 2021-05-11
Payer: OTHER GOVERNMENT

## 2021-05-11 VITALS — BODY MASS INDEX: 29.8 KG/M2 | WEIGHT: 220 LBS | HEIGHT: 72 IN

## 2021-05-11 DIAGNOSIS — M25.562 CHRONIC PAIN OF LEFT KNEE: ICD-10-CM

## 2021-05-11 DIAGNOSIS — M22.42 CHONDROMALACIA OF BOTH PATELLAE: ICD-10-CM

## 2021-05-11 DIAGNOSIS — M25.561 CHRONIC PAIN OF RIGHT KNEE: Primary | ICD-10-CM

## 2021-05-11 DIAGNOSIS — M22.41 CHONDROMALACIA OF BOTH PATELLAE: ICD-10-CM

## 2021-05-11 DIAGNOSIS — G89.29 CHRONIC PAIN OF LEFT KNEE: ICD-10-CM

## 2021-05-11 DIAGNOSIS — G89.29 CHRONIC PAIN OF RIGHT KNEE: Primary | ICD-10-CM

## 2021-05-11 PROCEDURE — 99213 OFFICE O/P EST LOW 20 MIN: CPT | Performed by: FAMILY MEDICINE

## 2021-05-11 RX ORDER — METHYLPREDNISOLONE 4 MG/1
TABLET ORAL
Qty: 21 KIT | Refills: 0 | Status: SHIPPED | OUTPATIENT
Start: 2021-05-11 | End: 2021-11-23 | Stop reason: ALTCHOICE

## 2021-05-11 NOTE — PROGRESS NOTES
Chief Complaint  Knee Pain ([facility] JUDSON KNEES)    followup recurrent bilateral knee pain with substantial patellofemoral arthropathy with underlying knee osteoarthritis with worsening pain    Abundio Rees is a 46 y.o. male Buddy Lion is a 49-year-old white male type II diabetic who is a walking  for the [employer]. He is being seen back today for 6 month follow-up on his bilateral knee symptomatic chondromalacia patella with underlying knee osteoarthritis. He last completed his Visco supplementation on 10/30/2018 which did provide fairly good pain relief for him but over the last few weeks with the weather change he has become a little bit more sore primarily anteriorly involving his knees. .      History of Present Illness for Follow Up Patient:      Buddy Lion is being seen in follow up today for [facility] claim for chronic bilateral knee pain. The patient rates their current pain as a 5-6 out of 10 on the pain scale. Activities aggravating current symptoms include walking long durations and after sitting for long periods of time. Activities relieving current symptoms include rest. Conservative treatment to date includes: rest, ice, NSAID X2 100 mg daily, knee bracing, home exercises, cortisone injections and Euflexxa most recently completed on 5/21/2019. Denies locking catching or true instability symptoms. He has continue with his exercise program does utilize his knee braces periodically. He is continued to lose weight and has lost about 50 pounds. Denies locking catching or instability symptoms. Neha Oh was last seen in the office on 12/17/2019 for follow-up on his bilateral knee significant osteoarthritis with patellofemoral arthropathy. His last round of viscosupplementation was completed on 5/21/2019 and provided very good pain relief for him.   He is continue with his anti-inflammatories most.  Increasing his home-based exercise program.  He is continue to lose weight and has lost 50 seen in the office on 11/3/2020 was continued on his conservative treatment for his underlying significant bilateral knee patellofemoral arthritis with less degrees of weightbearing osteoarthritis. 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 Once again they are in the process and have reopened his [employer] claim but we are awaiting approval for any type of additional steroid injections and because this claim is not yet allowed for underlying osteoarthritis which is what chondromalacia is, he is yet to be approved for repeat viscosupplementation which we have been doing on him since 2014 and was never a problem up until [employer] inadvertently closed his case during the coronavirus pandemic. He continues have crepitation and popping difficulty with distance walking going up and down stairs squatting and kneeling. He has continued with his Celebrex. He does work on his home-based exercise program and does utilize his bracing. He is being seen today for repeat evaluation. He may have noticed some catching on his right knee at times. Attest: I have reviewed and attest the documentation of the HPI documented by my . I will make any changes if necessary. Enc Date: 5/11/2021  Time: 4:04 PM  Provider: Heike Escobar MD        Social History     Tobacco Use    Smoking status: Never Smoker    Smokeless tobacco: Never Used   Substance Use Topics    Alcohol use: Yes     Alcohol/week: 0.0 standard drinks     Comment: occasionally    Drug use: No        Review of Systems  Pertinent items are noted in HPI  Review of systems reviewed from Patient History Form dated on 12/17/2019 and available in the patient's chart under the Media tab.        Vital Signs     Ht 6' (1.829 m)   Wt 220 lb (99.8 kg)   BMI 29.84 kg/m²       General Exam: Constitutional: Patient is adequately groomed with no evidence of malnutrition  DTRs: Deep tendon reflexes are intact  Mental Status: The patient is oriented to time, place and person. The patient's mood and affect are appropriate. Lymphatic: The lymphatic examination bilaterally reveals all areas to be without enlargement or induration. Vascular: Examination reveals no swelling or calf tenderness. Peripheral pulses are palpable and 2+. Neurological: The patient has good coordination. There is no weakness or sensory deficit. Knee Examination  Inspection:  He is in slight varus and has trace  effusions bilaterally. There is marked patellofemoral crepitation. No high-grade malalignment.     Palpation: He has recurrent tenderness to palpation over the medial joint line and also over the retropatellar joint. He does have a mildly to moderately positive grind testing. This is certainly more prominent than last week. He is most tender along the anterior 3rd medial joint lines. He rates this at about of 5-6 out of 10.     Rang of Motion:  Range of motion -5-120 bilaterally.     Strength:  Strength testing 4+ out of 5 knee flexion extension.     Special Tests:  He is a mildly to moderately positive grind test and pain with medial Helen's. I do not feel high-grade click but certainly this worsens his pain bilaterally. This is remarkable for crepitation than actual pop or click. No evidence of instability.     Skin: There are no rashes, ulcerations or lesions. Distal neurovascular exam is intact.     Gait: Improved gait.     Reflex reflexes are preserved her     Additional Comments:      Additional Examinations:  Right Lower Extremity: Examination of the right lower extremity does not show any tenderness, deformity or injury. Range of motion is unremarkable. There is no gross instability. There are no rashes, ulcerations or lesions.   Strength and tone are normal.  Left Lower Extremity: Examination of the left lower extremity does not show any tenderness, deformity or injury. Range of motion is unremarkable. There is no gross instability. There are no rashes, ulcerations or lesions. Strength and tone are normal.  Lower Back: Examination of the lower back does not show any tenderness, deformity or injury. Range of motion is unremarkable. There is no gross instability. There are no rashes, ulcerations or lesions. Strength and tone are normal.  Examination of the bilateral hip reveals intact skin. The patient demonstrates full painless range of motion with regards to flexion, abduction, internal and external rotation. There is not tenderness about the greater trochanter. There is a negative straight leg raise against resistance. Strength is 5/5 thorough out all planes.        Diagnostic Test Findings: Bilateral knee MRIs performed 5/5/2017 as listed above     Right knee MRI 5/5/2017  CONCLUSION:    1. Grade 3-4 chondromalacia of lateral facet of the patella with foci of osteochondral erosion    and chondral fissuring. 2. No meniscal tear or acute ligamentous injury. 3. Mild chronic lateral capsulitis and popliteal tendinopathy. 4. Enthesopathy of superior patellar tendon, no tear.       Left knee MRI 5/5/2017  CONCLUSION:    1. Stable grade 3-4 chondromalacia of lateral facet of the patella with focal osteochondral    erosion, chondral thinning and fissuring. Grade 3 chondromalacia of superolateral trochlea with    chondral thinning and fissuring. 2. Stable chronic mild-moderate tendinopathy of superior patellar tendon with a 2cm slit-like    interstitial tear. 3. Progressed lateral capsulitis with chronic moderate tendinopathy of popliteal tendon. Does    the patient have gout? 4. 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     5. Stable grade 2 chondromalacia of weightbearing medial femorotibial compartment.          We did do updated bilateral knee AP and PA weightbearing sunrise and lateral films in the today which does show stable appearance to his underlying patellofemoral arthropathy with mild to moderate narrowing of his medial compartments. Assessment & Plan:    Encounter Diagnoses   Name Primary?  Chronic pain of right knee Yes    Chronic pain of left knee     Chondromalacia of both patellae        Orders Placed This Encounter   Procedures    XR KNEE RIGHT (MIN 4 VIEWS)     Standing Status:   Future     Number of Occurrences:   1     Standing Expiration Date:   5/11/2022     Order Specific Question:   Reason for exam:     Answer:   pain    XR KNEE LEFT (MIN 4 VIEWS)     Standing Status:   Future     Number of Occurrences:   1     Standing Expiration Date:   5/11/2022     Order Specific Question:   Reason for exam:     Answer:   pain    MRI KNEE RIGHT WO CONTRAST     Standing Status:   Future     Standing Expiration Date:   8/11/2021     Scheduling Instructions:      [facility] [place] [address]      [phone]            AUTH #:      TIME AND DATE TBD      PLEASE CALL PATIENT ONCE APPROVED TO SCHEDULE       PUSH TO [facility]            Remember that it may take several business days to pre-cert your MRI through your insurance. Our office will contact you as soon as we have the approval. We will not give any test results over the phone. Please call [phone] once you have your test day and time to schedule a follow up with Dr. Aman Chambers. Order Specific Question:   Reason for exam:     Answer:   EVALUATE WEIGHTBEARING OA/PREVIOUSLY DOCUMENTED GRADE 3-4 CMP, R/O MMT         Treatment Plan:  Treatment options were discussed with Yocasta Mcgraw. Clinically at this point he Is feeling worse compared to his last visit. His knees have become a little bit more achy recently but reports no interim history of actual injury or no activity prior to becoming symptomatic.   He is having pain ranging greatly from a 2-9 out of 10 depending on how much walking he is doing. Would repeat his Medrol Dosepak and continue with his Celebrex 200 mg daily. He has done an excellent job maintaining his weight loss of 50 to 60 pounds and will utilize his knee braces. His last steroid injection was performed on 11/3/2020. We did follow the C9 to amend underlying knee osteoarthritis in addition to his previously documented class III-IV chondromalacia patella which effectively represents patellofemoral arthropathy and effectively osteoarthritis of the knee. I would also like for him to have an updated MRI of his knee to assess degree of his wear of the retropatellar joint but also definitive documentation regarding osteoarthritis of his weightbearing femoral-tibial compartments medially and laterally. Once again he has had an excellent response over the last 7 years to periodic viscosupplementation. We will see him back post imaging and on his C9 we will also request the MRI in addition to bilateral knee steroid injections and hopefully we can get his osteoarthritis diagnosis amended so we can resume his viscosupplementation which seems to help him more than anything. Icing and activity modification was discussed. He will contact us in the interim with questions or concerns. .      This dictation was performed with a verbal recognition program (DRAGON) and it was checked for errors. It is possible that there are still dictated errors within this office note. If so, please bring any errors to my attention for an addendum. All efforts were made to ensure that this office note is accurate.

## 2021-05-13 ENCOUNTER — TELEPHONE (OUTPATIENT)
Dept: ORTHOPEDIC SURGERY | Age: 51
End: 2021-05-13

## 2021-05-13 NOTE — TELEPHONE ENCOUNTER
Spoke to patient and informed them that their MRI has been authorized through [facility] [place] through 6/14/21 and that they can call and schedule scan at their convenience. Also told them that they can call and schedule a f/u with Dr. Nicole Hobbs once they have MRI scheduled, leaving at least 2-3 days for our office to receive their results.

## 2021-05-25 ENCOUNTER — OFFICE VISIT (OUTPATIENT)
Dept: ORTHOPEDIC SURGERY | Age: 51
End: 2021-05-25
Payer: OTHER GOVERNMENT

## 2021-05-25 VITALS — WEIGHT: 220 LBS | HEIGHT: 72 IN | BODY MASS INDEX: 29.8 KG/M2

## 2021-05-25 DIAGNOSIS — M25.562 CHRONIC PAIN OF BOTH KNEES: ICD-10-CM

## 2021-05-25 DIAGNOSIS — G89.29 CHRONIC PAIN OF BOTH KNEES: ICD-10-CM

## 2021-05-25 DIAGNOSIS — M25.561 CHRONIC PAIN OF BOTH KNEES: ICD-10-CM

## 2021-05-25 DIAGNOSIS — M25.561 CHRONIC PAIN OF RIGHT KNEE: ICD-10-CM

## 2021-05-25 DIAGNOSIS — M17.0 PRIMARY OSTEOARTHRITIS OF BOTH KNEES: ICD-10-CM

## 2021-05-25 DIAGNOSIS — G89.29 CHRONIC PAIN OF RIGHT KNEE: ICD-10-CM

## 2021-05-25 DIAGNOSIS — M22.42 CHONDROMALACIA OF BOTH PATELLAE: ICD-10-CM

## 2021-05-25 DIAGNOSIS — M22.41 CHONDROMALACIA OF BOTH PATELLAE: ICD-10-CM

## 2021-05-25 DIAGNOSIS — G89.29 CHRONIC PAIN OF LEFT KNEE: Primary | ICD-10-CM

## 2021-05-25 DIAGNOSIS — M25.562 CHRONIC PAIN OF LEFT KNEE: Primary | ICD-10-CM

## 2021-05-25 PROCEDURE — 99213 OFFICE O/P EST LOW 20 MIN: CPT | Performed by: FAMILY MEDICINE

## 2021-05-25 PROCEDURE — 20610 DRAIN/INJ JOINT/BURSA W/O US: CPT | Performed by: FAMILY MEDICINE

## 2021-05-25 RX ORDER — CELECOXIB 200 MG/1
200 CAPSULE ORAL DAILY
Qty: 90 CAPSULE | Refills: 2 | Status: SHIPPED | OUTPATIENT
Start: 2021-05-25 | End: 2022-03-14

## 2021-05-25 RX ORDER — BETAMETHASONE SODIUM PHOSPHATE AND BETAMETHASONE ACETATE 3; 3 MG/ML; MG/ML
24 INJECTION, SUSPENSION INTRA-ARTICULAR; INTRALESIONAL; INTRAMUSCULAR; SOFT TISSUE ONCE
Status: COMPLETED | OUTPATIENT
Start: 2021-05-25 | End: 2021-05-25

## 2021-05-25 RX ORDER — BUPIVACAINE HYDROCHLORIDE 2.5 MG/ML
4 INJECTION, SOLUTION INFILTRATION; PERINEURAL ONCE
Status: COMPLETED | OUTPATIENT
Start: 2021-05-25 | End: 2021-05-25

## 2021-05-25 RX ORDER — LIDOCAINE HYDROCHLORIDE 10 MG/ML
2 INJECTION, SOLUTION INFILTRATION; PERINEURAL ONCE
Status: COMPLETED | OUTPATIENT
Start: 2021-05-25 | End: 2021-05-25

## 2021-05-25 RX ADMIN — BETAMETHASONE SODIUM PHOSPHATE AND BETAMETHASONE ACETATE 24 MG: 3; 3 INJECTION, SUSPENSION INTRA-ARTICULAR; INTRALESIONAL; INTRAMUSCULAR; SOFT TISSUE at 14:43

## 2021-05-25 RX ADMIN — LIDOCAINE HYDROCHLORIDE 2 ML: 10 INJECTION, SOLUTION INFILTRATION; PERINEURAL at 14:45

## 2021-05-25 RX ADMIN — BUPIVACAINE HYDROCHLORIDE 10 MG: 2.5 INJECTION, SOLUTION INFILTRATION; PERINEURAL at 14:44

## 2021-05-25 NOTE — PROGRESS NOTES
Chief Complaint  Knee Pain ([facility] JUDSON KNEES)    followup recurrent bilateral knee pain with substantial patellofemoral arthropathy with underlying knee osteoarthritis with worsening pain    Analilia Drummond is a 46 y.o. male Lisa Hunter is a 17-year-old white male type II diabetic who is a walking  for the [employer]. He is being seen back today for follow-up on his bilateral knee symptomatic chondromalacia patella with underlying knee osteoarthritis. He last completed his Visco supplementation on 12/17/2019 which did provide very good pain relief for him but over the last few weeks with the weather change he has become a little bit more sore primarily anteriorly involving his knees. .      History of Present Illness for Follow Up Patient:      Lisa Hunter is being seen in follow up today for [facility] claim for chronic bilateral knee pain. The patient rates their current pain as a 5-6 out of 10 on the pain scale. Activities aggravating current symptoms include walking long durations and after sitting for long periods of time. Activities relieving current symptoms include rest. Conservative treatment to date includes: rest, ice, NSAID X2 100 mg daily, knee bracing, home exercises, cortisone injections and Euflexxa most recently completed on 5/21/2019. Denies locking catching or true instability symptoms. He has continue with his exercise program does utilize his knee braces periodically. He is continued to lose weight and has lost about 50 pounds. Denies locking catching or instability symptoms. Ramiro Thornton was last seen in the office on 12/17/2019 for follow-up on his bilateral knee significant osteoarthritis with patellofemoral arthropathy. His last round of viscosupplementation was completed on 5/21/2019 and provided very good pain relief for him.   He is continue with his anti-inflammatories most.  Increasing his home-based exercise program.  He is continue to lose weight and has lost 50 pounds and has maintained this. Distance walking going up and down stairs can produce pain in the range of 6-7 out of 10 and he does have discomfort which is more achy in nature anteriorly and anterior medially with weather changes. Stairclimbing is also problematic. He does try to perform his home-based exercises. Apparently be did try to admit for repeat steroid injections and viscosupplementation earlier this year however this was denied because [employer] apparently had inadvertently closed his claim due to the coronavirus pandemic. It took him 3 days to get that straightened out he is seen today for evaluation complaining of increasing pain and discomfort involving his knees. He is also having pain at night episodically. At baseline his symptoms are more achy but can be more sharp towards in the day or if he is doing lots of stairs. Makenna Helms follows last seen in the office on 10/22/2020 was continued on conservative treatment for his underlying significant bilateral knee patellofemoral arthropathy with osteoarthritis. As noted previously for some reason tried to submit for repeat steroid injections and viscosupplementation she has helped him substantially for years but he will be secondary to the coronavirus outbreak, for some reason his claim was closed. He is in the process of getting this reopened and we have gotten approval for the steroid injections however there has been some underlying difficulty in getting approval for viscosupplementation once again. We have been doing this for many years and this is helped him substantially. He has continued with his weight loss and has lost over 50 to 60 pounds and has maintained this. He is still having pain with repetitive stair climbing and distance walking and he is a walking . He does have crepitation and popping but does work on his home-based exercise program and has continued with his anti-inflammatories.     Makenna Helms was last seen in the office on 11/3/2020 was continued on his conservative treatment for his underlying significant bilateral knee patellofemoral arthritis with less degrees of weightbearing osteoarthritis. 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 Once again they are in the process and have reopened his [employer] claim but we are awaiting approval for any type of additional steroid injections and because this claim is not yet allowed for underlying osteoarthritis which is what chondromalacia is, he is yet to be approved for repeat viscosupplementation which we have been doing on him since 2014 and was never a problem up until [employer] inadvertently closed his case during the coronavirus pandemic. He continues have crepitation and popping difficulty with distance walking going up and down stairs squatting and kneeling. He has continued with his Celebrex. He does work on his home-based exercise program and does utilize his bracing. He is being seen today for repeat evaluation. He may have noticed some catching on his right knee at times. Terrance Lei was last seen in the office on 5/11/2021 was continued to have substantial pain to his knees from his underlying previously documented weightbearing osteoarthritis with fairly high-grade patellofemoral arthropathy and chondromalacia. We had submitted previously for the diagnosis of osteoarthritis which was clearly pleasant even on his most recent MRI which is moderate to high-grade at the patellofemoral joint and more moderate in the weightbearing surfaces. He did have some chronic capsulitis and quad/patellar tendon tendinopathy without evidence of new ligamentous or muscular injury. We had gotten approval for bilateral knee steroid injection is continue with his Celebrex with use of his knee brace.  His pain symptoms are varying greatly from a 5-8 out of 10. To this depends on how much he is walking and is walking around. He is still in the process of trying to get his approval for viscosupplementation injections rule out on his claim. Once again these have been allowed for years in the past up until the coronavirus pandemic when his claim was inadvertently closed. Attest: I have reviewed and attest the documentation of the HPI documented by my . I will make any changes if necessary. Enc Date: 5/25/2021  Time: 3:01 PM  Provider: Alyssa Fiore MD        Social History     Tobacco Use    Smoking status: Never Smoker    Smokeless tobacco: Never Used   Substance Use Topics    Alcohol use: Yes     Alcohol/week: 0.0 standard drinks     Comment: occasionally    Drug use: No        Review of Systems  Pertinent items are noted in HPI  Review of systems reviewed from Patient History Form dated on 12/17/2019 and available in the patient's chart under the Media tab. Vital Signs     Ht 6' (1.829 m)   Wt 220 lb (99.8 kg)   BMI 29.84 kg/m²       General Exam:   Constitutional: Patient is adequately groomed with no evidence of malnutrition  DTRs: Deep tendon reflexes are intact  Mental Status: The patient is oriented to time, place and person. The patient's mood and affect are appropriate. Lymphatic: The lymphatic examination bilaterally reveals all areas to be without enlargement or induration. Vascular: Examination reveals no swelling or calf tenderness. Peripheral pulses are palpable and 2+. Neurological: The patient has good coordination. There is no weakness or sensory deficit. Knee Examination  Inspection:  He is in slight varus and has trace  effusions bilaterally. There is marked patellofemoral crepitation. No high-grade malalignment.     Palpation: He has recurrent tenderness to palpation over the medial joint line and also over the retropatellar joint.   He does have a mildly to moderately positive above     Right knee MRI 2017  CONCLUSION:    1. Grade 3-4 chondromalacia of lateral facet of the patella with foci of osteochondral erosion    and chondral fissuring. 2. No meniscal tear or acute ligamentous injury. 3. Mild chronic lateral capsulitis and popliteal tendinopathy. 4. Enthesopathy of superior patellar tendon, no tear.       Left knee MRI 2017  CONCLUSION:    1. Stable grade 3-4 chondromalacia of lateral facet of the patella with focal osteochondral    erosion, chondral thinning and fissuring. Grade 3 chondromalacia of superolateral trochlea with    chondral thinning and fissuring. 2. Stable chronic mild-moderate tendinopathy of superior patellar tendon with a 2cm slit-like    interstitial tear. 3. Progressed lateral capsulitis with chronic moderate tendinopathy of popliteal tendon. Does    the patient have gout? 4. 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 5. Stable grade 2 chondromalacia of weightbearing medial femorotibial compartment.      MRI performed at [facility] 2021 is listed above  Narrative   Site: [facility] Rad #: [id number] #: [id number] Procedure: MR Right Knee w/o Contrast ; Reason for Exam: evaluate weight bearing osteoarthritis. previously documented grade 3-4 chondromalacia of patella. rule out medial meniscus tear   This document is confidential medical information.  Unauthorized disclosure or use of this information is prohibited by law. If you are not the intended recipient of this document, please advise us by calling immediately [phone]. [address]           Patient Name: Yaima Rice   Case ID: [id number]   Patient : 1970   Referring Physician: Shaila Rosen MD   Exam Date: 2021   Exam Description: MR Right Knee w/o Contrast            HISTORY:  Evaluate weightbearing osteoarthritis.  Previously documented grade 3-4 chondromalacia  of patella.  Evaluate for medial meniscus tear.       TECHNICAL FACTORS:  Long- and short-axis fat- and water-weighted images were performed.       COMPARISON:  None.       FINDINGS:  ACL, PCL, LCL, MCL, flexor mechanism, and extensor mechanism are intact.       Thickened MCL.  Sprain, scarring and capsulitis present.       No fracture, AVN, or mass demonstrated.       Patellar and quadriceps tendons are intact.  Mild patellar and quadriceps tendon thickening at    the patellar attachment.  Traction related bony hypertrophy present.       Inflammation present within Hoffa's fat.  Traction related bony hypertrophy involves the    anterior tibial tubercle.       CONCLUSION:   1. Intermediate-grade to high-grade patellofemoral compartment chondromalacia. 2. Thickened MCL.  Chronic sprain, scarring and capsulitis present. 3. No meniscal or cruciate tear. 4. No fracture, AVN or mass. 5. Distal quadriceps and proximal patellar tendon tendinopathy at their attachment to the    patella.  No tear.       Thank you for the opportunity to provide your interpretation.               Jero Trinh MD       A: GM/mg 05/20/2021 10:00 PM   T: MG 05/20/2021 7:59 PM       We did do updated bilateral knee AP and PA weightbearing sunrise and lateral films in the office on 5/11/2021 which does show stable appearance to his underlying patellofemoral arthropathy with mild to moderate narrowing of his medial compartments. Assessment & Plan:    Encounter Diagnoses   Name Primary?  Chronic pain of left knee Yes    Chronic pain of right knee     Chondromalacia of both patellae     Chronic pain of both knees     Primary osteoarthritis of both knees        Orders Placed This Encounter   Procedures    20610 - [place] DRAIN/INJECT LARGE JOINT/BURSA         Treatment Plan:  Treatment options were discussed with Abundio Rees. Clinically at this point he Is feeling worse compared to his last visit.   His knees have become a little bit more achy recently but reports no interim history of actual injury or no activity prior to becoming symptomatic. He is having pain ranging greatly from a 2-9 out of 10 depending on how much walking he is doing. He did get a transient improvement with his Medrol pack and his symptoms have worsened once again. He is continue with his Celebrex 200 mg daily. We will take a statin outstanding job maintaining his 50 to 60 pound weight loss was encouraged to utilize his braces periodically. His last steroid injection was performed on 11/3/2021 and given his worsening pain symptoms, we did repeat bilateral knee steroid injections today using 2 cc of Celestone, 2 cc Marcaine, 1 cc of Xylocaine. His current MRI does show moderate weightbearing osteoarthritis and moderate to significant osteoarthritis to the patellofemoral joint with capsulitis without evidence of any ligamentous or meniscal injury. He will continue with his home-based exercise program and was given a refill on his Celebrex. As he clearly does have osteoarthritis of his knee, we will try approval for viscosupplementation once again. Once again he has had this for a number of years and is rotted very well to this. Hopefully with this being a [employer], we can get this through his claim. Icing and activity modification was discussed. He will contact us in the interim with questions or concerns and hopefully we can see him back in a couple of weeks to pursue viscosupplementation once again. This dictation was performed with a verbal recognition program (DRAGON) and it was checked for errors. It is possible that there are still dictated errors within this office note. If so, please bring any errors to my attention for an addendum. All efforts were made to ensure that this office note is accurate.

## 2021-05-31 DIAGNOSIS — I10 BENIGN ESSENTIAL HTN: ICD-10-CM

## 2021-05-31 DIAGNOSIS — E11.9 CONTROLLED TYPE 2 DIABETES MELLITUS WITHOUT COMPLICATION, WITHOUT LONG-TERM CURRENT USE OF INSULIN (HCC): ICD-10-CM

## 2021-06-01 RX ORDER — SITAGLIPTIN AND METFORMIN HYDROCHLORIDE 1000; 50 MG/1; MG/1
TABLET, FILM COATED ORAL
Qty: 180 TABLET | Refills: 3 | Status: SHIPPED | OUTPATIENT
Start: 2021-06-01 | End: 2022-07-01

## 2021-06-01 RX ORDER — LISINOPRIL 5 MG/1
TABLET ORAL
Qty: 90 TABLET | Refills: 3 | Status: SHIPPED | OUTPATIENT
Start: 2021-06-01 | End: 2022-07-01

## 2021-06-02 ENCOUNTER — TELEPHONE (OUTPATIENT)
Dept: ORTHOPEDIC SURGERY | Age: 51
End: 2021-06-02

## 2021-06-02 NOTE — TELEPHONE ENCOUNTER
LVM for patient that euflexxa injections have been approved for bilateral knees through [employer]. Told patient they could call central scheduling at [phone] at their convenience to schedule those appointments.  Also told them if they had any problems or questions, they could call me directly at [phone]    Approved 6/21/21 - 7/19/21

## 2021-06-04 ENCOUNTER — TELEPHONE (OUTPATIENT)
Dept: INTERNAL MEDICINE CLINIC | Age: 51
End: 2021-06-04

## 2021-06-04 NOTE — TELEPHONE ENCOUNTER
Pt called with c/o nisha 2-3 times this week. He has not taken his Lisinopril for the past week due to not getting it refilled. Pt startted back on med yesterday. Pt does not know what know what his blood pressure is because he is at work. He will be home later this evening.   He will check blood pressure and send my chart message with results

## 2021-06-04 NOTE — TELEPHONE ENCOUNTER
Patient needs to check his blood pressure as soon as possible. He should go to the emergency department if systolic blood pressure is 170 or higher. He can call with any questions or concerns. If headache worsens or if new symptoms develop he needs to go to the emergency room.

## 2021-06-04 NOTE — TELEPHONE ENCOUNTER
Spoke with pt. His BP is 146/96. He just now got home from work. He will rest a bit and check it again.   Pt will send his BP numbers in my chart

## 2021-06-29 ENCOUNTER — OFFICE VISIT (OUTPATIENT)
Dept: ORTHOPEDIC SURGERY | Age: 51
End: 2021-06-29
Payer: OTHER GOVERNMENT

## 2021-06-29 DIAGNOSIS — M22.42 CHONDROMALACIA OF BOTH PATELLAE: ICD-10-CM

## 2021-06-29 DIAGNOSIS — M22.41 CHONDROMALACIA OF BOTH PATELLAE: ICD-10-CM

## 2021-06-29 DIAGNOSIS — M17.0 PRIMARY OSTEOARTHRITIS OF BOTH KNEES: Primary | ICD-10-CM

## 2021-06-29 DIAGNOSIS — G89.29 CHRONIC PAIN OF LEFT KNEE: ICD-10-CM

## 2021-06-29 DIAGNOSIS — M25.561 CHRONIC PAIN OF RIGHT KNEE: ICD-10-CM

## 2021-06-29 DIAGNOSIS — G89.29 CHRONIC PAIN OF RIGHT KNEE: ICD-10-CM

## 2021-06-29 DIAGNOSIS — M25.562 CHRONIC PAIN OF LEFT KNEE: ICD-10-CM

## 2021-06-29 PROCEDURE — 99213 OFFICE O/P EST LOW 20 MIN: CPT | Performed by: FAMILY MEDICINE

## 2021-06-29 PROCEDURE — 20610 DRAIN/INJ JOINT/BURSA W/O US: CPT | Performed by: FAMILY MEDICINE

## 2021-06-29 RX ORDER — METHYLPREDNISOLONE 4 MG/1
TABLET ORAL
Qty: 21 KIT | Refills: 0 | Status: SHIPPED | OUTPATIENT
Start: 2021-06-29 | End: 2021-11-23 | Stop reason: ALTCHOICE

## 2021-06-29 RX ORDER — HYALURONATE SODIUM 10 MG/ML
40 SYRINGE (ML) INTRAARTICULAR ONCE
Status: COMPLETED | OUTPATIENT
Start: 2021-06-29 | End: 2021-06-29

## 2021-06-29 RX ADMIN — Medication 40 MG: at 13:07

## 2021-06-29 NOTE — PROGRESS NOTES
Chief Complaint  Knee Pain (WC - CK BILATERAL KNEES/EUFLEXXA #1 )    followup recurrent bilateral knee pain with substantial patellofemoral arthropathy with underlying knee osteoarthritis with synovitis and consideration of repeat viscosupplementation    Sandra Ventura is a 46 y.o. male Mouna Tarango is a 49-year-old white male type II diabetic who is a walking  for the [employer]. He is being seen back today for follow-up on his bilateral knee symptomatic chondromalacia patella with underlying knee osteoarthritis. He last completed his Visco supplementation on 12/17/2019 which did provide very good pain relief for him but over the last few weeks with the weather change he has become a little bit more sore primarily anteriorly involving his knees. .      History of Present Illness for Follow Up Patient:      Mouna Tarango is being seen in follow up today for [employer] claim for chronic bilateral knee pain. The patient rates their current pain as a 5-6 out of 10 on the pain scale. Activities aggravating current symptoms include walking long durations and after sitting for long periods of time. Activities relieving current symptoms include rest. Conservative treatment to date includes: rest, ice, NSAID X2 100 mg daily, knee bracing, home exercises, cortisone injections and Euflexxa most recently completed on 5/21/2019. Denies locking catching or true instability symptoms. He has continue with his exercise program does utilize his knee braces periodically. He is continued to lose weight and has lost about 50 pounds. Denies locking catching or instability symptoms. [facility] was last seen in the office on 12/17/2019 for follow-up on his bilateral knee significant osteoarthritis with patellofemoral arthropathy. His last round of viscosupplementation was completed on 5/21/2019 and provided very good pain relief for him.   He is continue with his anti-inflammatories most.  Increasing his home-based exercise program.  He is continue to lose weight and has lost 50 pounds and has maintained this. Distance walking going up and down stairs can produce pain in the range of 6-7 out of 10 and he does have discomfort which is more achy in nature anteriorly and anterior medially with weather changes. Stairclimbing is also problematic. He does try to perform his home-based exercises. Apparently be did try to admit for repeat steroid injections and viscosupplementation earlier this year however this was denied because [employer] apparently had inadvertently closed his claim due to the coronavirus pandemic. It took him 3 days to get that straightened out he is seen today for evaluation complaining of increasing pain and discomfort involving his knees. He is also having pain at night episodically. At baseline his symptoms are more achy but can be more sharp towards in the day or if he is doing lots of stairs. Maribel bennett last seen in the office on 10/22/2020 was continued on conservative treatment for his underlying significant bilateral knee patellofemoral arthropathy with osteoarthritis. As noted previously for some reason tried to submit for repeat steroid injections and viscosupplementation she has helped him substantially for years but he will be secondary to the coronavirus outbreak, for some reason his claim was closed. He is in the process of getting this reopened and we have gotten approval for the steroid injections however there has been some underlying difficulty in getting approval for viscosupplementation once again. We have been doing this for many years and this is helped him substantially. He has continued with his weight loss and has lost over 50 to 60 pounds and has maintained this. He is still having pain with repetitive stair climbing and distance walking and he is a walking .   He does have crepitation and popping but does work on his home-based exercise program and has continued with his anti-inflammatories. Petrona Bautista was last seen in the office on 11/3/2020 was continued on his conservative treatment for his underlying significant bilateral knee patellofemoral arthritis with less degrees of weightbearing osteoarthritis. 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 Once again they are in the process and have reopened his [employer] claim but we are awaiting approval for any type of additional steroid injections and because this claim is not yet allowed for underlying osteoarthritis which is what chondromalacia is, he is yet to be approved for repeat viscosupplementation which we have been doing on him since 2014 and was never a problem up until [employer] inadvertently closed his case during the coronavirus pandemic. He continues have crepitation and popping difficulty with distance walking going up and down stairs squatting and kneeling. He has continued with his Celebrex. He does work on his home-based exercise program and does utilize his bracing. He is being seen today for repeat evaluation. He may have noticed some catching on his right knee at times. Petrona Bautista was last seen in the office on 5/11/2021 was continued to have substantial pain to his knees from his underlying previously documented weightbearing osteoarthritis with fairly high-grade patellofemoral arthropathy and chondromalacia. We had submitted previously for the diagnosis of osteoarthritis which was clearly pleasant even on his most recent MRI which is moderate to high-grade at the patellofemoral joint and more moderate in the weightbearing surfaces. He did have some chronic capsulitis and quad/patellar tendon tendinopathy without evidence of new ligamentous or muscular injury.  We had gotten approval for bilateral knee steroid injection is continue with Status: The patient is oriented to time, place and person. The patient's mood and affect are appropriate. Lymphatic: The lymphatic examination bilaterally reveals all areas to be without enlargement or induration. Vascular: Examination reveals no swelling or calf tenderness. Peripheral pulses are palpable and 2+. Neurological: The patient has good coordination. There is no weakness or sensory deficit. Knee Examination  Inspection:  He is in slight varus and has trace  effusions bilaterally. There is marked patellofemoral crepitation. No high-grade malalignment.     Palpation: He has improvements in his tenderness to palpation over the medial joint line and also over the retropatellar joint. He does have a mildly  positive grind testing. This is certainly more prominent than last week. He is most tender along the anterior 3rd medial joint lines. He rates this at about of 5-6 out of 10.     Rang of Motion:  Range of motion -5-120 bilaterally.     Strength:  Strength testing 4+ out of 5 knee flexion extension.     Special Tests:  He is a mildly positive grind test and pain with medial Helen's. I do not feel high-grade click but certainly this worsens his pain bilaterally. This is remarkable for crepitation than actual pop or click. No evidence of instability.     Skin: There are no rashes, ulcerations or lesions. Distal neurovascular exam is intact.     Gait: Improved gait.     Reflex reflexes are preserved her     Additional Comments:      Additional Examinations:  Right Lower Extremity: Examination of the right lower extremity does not show any tenderness, deformity or injury. Range of motion is unremarkable. There is no gross instability. There are no rashes, ulcerations or lesions. Strength and tone are normal.  Left Lower Extremity: Examination of the left lower extremity does not show any tenderness, deformity or injury. Range of motion is unremarkable. There is no gross instability. weight bearing osteoarthritis. previously documented grade 3-4 chondromalacia of patella. rule out medial meniscus tear   This document is confidential medical information.  Unauthorized disclosure or use of this information is prohibited by law. If you are not the intended recipient of this document, please advise us by calling immediately [phone]. [address], [address] [facility]           Patient Name: Duncan Calvillo   Case ID: [id number]   Patient : 1970   Referring Physician: Dany Seo MD   Exam Date: 2021   Exam Description: MR Right Knee w/o Contrast            HISTORY:  Evaluate weightbearing osteoarthritis. Previously documented grade 3-4 chondromalacia    of patella.  Evaluate for medial meniscus tear.       TECHNICAL FACTORS:  Long- and short-axis fat- and water-weighted images were performed.       COMPARISON:  None.       FINDINGS:  ACL, PCL, LCL, MCL, flexor mechanism, and extensor mechanism are intact.       Thickened MCL.  Sprain, scarring and capsulitis present.       No fracture, AVN, or mass demonstrated.       Patellar and quadriceps tendons are intact.  Mild patellar and quadriceps tendon thickening at    the patellar attachment.  Traction related bony hypertrophy present.       Inflammation present within Hoffa's fat.  Traction related bony hypertrophy involves the    anterior tibial tubercle.       CONCLUSION:   1. Intermediate-grade to high-grade patellofemoral compartment chondromalacia. 2. Thickened MCL.  Chronic sprain, scarring and capsulitis present. 3. No meniscal or cruciate tear. 4. No fracture, AVN or mass.    5. Distal quadriceps and proximal patellar tendon tendinopathy at their attachment to the    patella.  No tear.       Thank you for the opportunity to provide your interpretation.               Candido Chatman MD       A: YOBANY/ 2021 10:00 PM   T:  2021 7:59 PM       We did do updated bilateral knee

## 2021-07-08 ENCOUNTER — OFFICE VISIT (OUTPATIENT)
Dept: ORTHOPEDIC SURGERY | Age: 51
End: 2021-07-08
Payer: OTHER GOVERNMENT

## 2021-07-08 DIAGNOSIS — M22.41 CHONDROMALACIA OF BOTH PATELLAE: ICD-10-CM

## 2021-07-08 DIAGNOSIS — M22.42 CHONDROMALACIA OF BOTH PATELLAE: ICD-10-CM

## 2021-07-08 DIAGNOSIS — G89.29 CHRONIC PAIN OF LEFT KNEE: ICD-10-CM

## 2021-07-08 DIAGNOSIS — G89.29 CHRONIC PAIN OF RIGHT KNEE: ICD-10-CM

## 2021-07-08 DIAGNOSIS — M25.561 CHRONIC PAIN OF RIGHT KNEE: ICD-10-CM

## 2021-07-08 DIAGNOSIS — M25.562 CHRONIC PAIN OF LEFT KNEE: ICD-10-CM

## 2021-07-08 DIAGNOSIS — M17.0 PRIMARY OSTEOARTHRITIS OF BOTH KNEES: Primary | ICD-10-CM

## 2021-07-08 PROCEDURE — 20610 DRAIN/INJ JOINT/BURSA W/O US: CPT | Performed by: FAMILY MEDICINE

## 2021-07-08 RX ORDER — HYALURONATE SODIUM 10 MG/ML
40 SYRINGE (ML) INTRAARTICULAR ONCE
Status: COMPLETED | OUTPATIENT
Start: 2021-07-08 | End: 2021-07-08

## 2021-07-08 RX ADMIN — Medication 40 MG: at 13:54

## 2021-07-13 ENCOUNTER — OFFICE VISIT (OUTPATIENT)
Dept: ORTHOPEDIC SURGERY | Age: 51
End: 2021-07-13
Payer: OTHER GOVERNMENT

## 2021-07-13 DIAGNOSIS — M25.562 CHRONIC PAIN OF LEFT KNEE: ICD-10-CM

## 2021-07-13 DIAGNOSIS — M25.561 CHRONIC PAIN OF RIGHT KNEE: ICD-10-CM

## 2021-07-13 DIAGNOSIS — M22.42 CHONDROMALACIA OF BOTH PATELLAE: ICD-10-CM

## 2021-07-13 DIAGNOSIS — M22.41 CHONDROMALACIA OF BOTH PATELLAE: ICD-10-CM

## 2021-07-13 DIAGNOSIS — G89.29 CHRONIC PAIN OF RIGHT KNEE: ICD-10-CM

## 2021-07-13 DIAGNOSIS — G89.29 CHRONIC PAIN OF LEFT KNEE: ICD-10-CM

## 2021-07-13 DIAGNOSIS — M17.0 PRIMARY OSTEOARTHRITIS OF BOTH KNEES: Primary | ICD-10-CM

## 2021-07-13 PROCEDURE — 20610 DRAIN/INJ JOINT/BURSA W/O US: CPT | Performed by: FAMILY MEDICINE

## 2021-07-13 RX ORDER — HYALURONATE SODIUM 10 MG/ML
40 SYRINGE (ML) INTRAARTICULAR ONCE
Status: COMPLETED | OUTPATIENT
Start: 2021-07-13 | End: 2021-07-13

## 2021-07-13 RX ADMIN — Medication 40 MG: at 13:17

## 2021-07-13 NOTE — PROGRESS NOTES
CC:  FU Knee Osteoarthritis with Viscosupplementation       HPI: Lizbeth Almendarez is a very pleasant 67-year-old white male who is a walking/riding mailman for the [employer] and is a type II diabetic who is a patient Dr. Lashell Rodriguez who is being seen today for reevaluation of his knees bilaterally and to continue with his Visco supplementation series. He is doing much better and is at least 80-85% improved over last week. Previously, he did have some increasing achiness with prolonged walking and going up and down stairs and steps but for the most part is doing reasonably well. He has been performing his home exercise program on a fairly regular basis and does take his chronic Celebrex 200 mg daily. He occasionally utilizes his braces and is here today for his third Euflexxa injection bilaterally. PE: no substantial change in exam.    Assessment:  Knee Osteoarthritis with viscosupplementation      PROCEDURE NOTE:    PRE-PROCEDURE DIAGNOSIS: DJD knee    POST-PROCEDURE DIAGNOSIS: DJD knee    Injection #3 of Euflexxa . PROCEDURE:  With the patient's permission, his bilaterally knee was prepped in standard sterile fashion with Betadine and Alcohol and the prefilled 2cc injection of Euflexxa was injected intra-articularly into the bilaterally knee via a superolateral approach without difficulty. The patient tolerated this well without difficulty. A band-aid was applied. POST-PROCEDURE INSTRUCTIONS GIVEN TO PATIENT: The patient was advised to ice the knee for 15-20 minutes to relieve any injection site related pain. FOLLOW-UP: as directed. He will continue with his Celebrex 200 mg daily and importance of complying with his home exercise program and periodic use of his brace and icing was also discussed. He is aware that he can repeat viscosupplementation at 6-month intervals or even an interim steroid injection if necessary.  He has done well with Visco supplementation in the past.

## 2021-11-23 ENCOUNTER — OFFICE VISIT (OUTPATIENT)
Dept: ORTHOPEDIC SURGERY | Age: 51
End: 2021-11-23
Payer: OTHER GOVERNMENT

## 2021-11-23 VITALS — HEIGHT: 72 IN | BODY MASS INDEX: 30.48 KG/M2 | WEIGHT: 225 LBS

## 2021-11-23 DIAGNOSIS — M25.562 CHRONIC PAIN OF BOTH KNEES: ICD-10-CM

## 2021-11-23 DIAGNOSIS — G89.29 CHRONIC PAIN OF BOTH KNEES: ICD-10-CM

## 2021-11-23 DIAGNOSIS — M25.561 CHRONIC PAIN OF BOTH KNEES: ICD-10-CM

## 2021-11-23 DIAGNOSIS — M17.0 PRIMARY OSTEOARTHRITIS OF BOTH KNEES: Primary | ICD-10-CM

## 2021-11-23 DIAGNOSIS — M22.42 CHONDROMALACIA OF BOTH PATELLAE: ICD-10-CM

## 2021-11-23 DIAGNOSIS — M22.41 CHONDROMALACIA OF BOTH PATELLAE: ICD-10-CM

## 2021-11-23 PROCEDURE — 99213 OFFICE O/P EST LOW 20 MIN: CPT | Performed by: FAMILY MEDICINE

## 2021-11-23 RX ORDER — METHYLPREDNISOLONE 4 MG/1
TABLET ORAL
Qty: 21 KIT | Refills: 0 | Status: SHIPPED | OUTPATIENT
Start: 2021-11-23 | End: 2022-05-06 | Stop reason: ALTCHOICE

## 2021-11-23 NOTE — PROGRESS NOTES
Chief Complaint  Knee Pain (WC CK B KNEES)    followup recurrent bilateral knee pain with substantial patellofemoral arthropathy with underlying knee osteoarthritis with synovitis     Rios Padilla is a 46 y.o. male Asif Dukes is a 49-year-old white male type II diabetic who is a walking  for the [employer]. He is being seen back today for follow-up on his bilateral knee symptomatic chondromalacia patella with underlying knee osteoarthritis. He last completed his Visco supplementation on 7/14/2021 which did provide very good pain relief for him but over the last few weeks with the weather change he has become a little bit more sore primarily anteriorly involving his knees. .      History of Present Illness for Follow Up Patient:      Asif Dukes is being seen in follow up today for [facility] claim for chronic bilateral knee pain. The patient rates their current pain as a 5-6 out of 10 on the pain scale. Activities aggravating current symptoms include walking long durations and after sitting for long periods of time. Activities relieving current symptoms include rest. Conservative treatment to date includes: rest, ice, NSAID X2 100 mg daily, knee bracing, home exercises, cortisone injections and Euflexxa most recently completed on 5/21/2019. Denies locking catching or true instability symptoms. He has continue with his exercise program does utilize his knee braces periodically. He is continued to lose weight and has lost about 50 pounds. Denies locking catching or instability symptoms. Elaine Curry was last seen in the office on 12/17/2019 for follow-up on his bilateral knee significant osteoarthritis with patellofemoral arthropathy. His last round of viscosupplementation was completed on 5/21/2019 and provided very good pain relief for him.   He is continue with his anti-inflammatories most.  Increasing his home-based exercise program.  He is continue to lose weight and has lost 50 pounds and has maintained this. Distance walking going up and down stairs can produce pain in the range of 6-7 out of 10 and he does have discomfort which is more achy in nature anteriorly and anterior medially with weather changes. Stairclimbing is also problematic. He does try to perform his home-based exercises. Apparently be did try to admit for repeat steroid injections and viscosupplementation earlier this year however this was denied because [employer] apparently had inadvertently closed his claim due to the coronavirus pandemic. It took him 3 days to get that straightened out he is seen today for evaluation complaining of increasing pain and discomfort involving his knees. He is also having pain at night episodically. At baseline his symptoms are more achy but can be more sharp towards in the day or if he is doing lots of stairs. Alicja Little follows last seen in the office on 10/22/2020 was continued on conservative treatment for his underlying significant bilateral knee patellofemoral arthropathy with osteoarthritis. As noted previously for some reason tried to submit for repeat steroid injections and viscosupplementation she has helped him substantially for years but he will be secondary to the coronavirus outbreak, for some reason his claim was closed. He is in the process of getting this reopened and we have gotten approval for the steroid injections however there has been some underlying difficulty in getting approval for viscosupplementation once again. We have been doing this for many years and this is helped him substantially. He has continued with his weight loss and has lost over 50 to 60 pounds and has maintained this. He is still having pain with repetitive stair climbing and distance walking and he is a walking . He does have crepitation and popping but does work on his home-based exercise program and has continued with his anti-inflammatories.     Alicja Little was last seen in the office on 11/3/2020 was continued on his conservative treatment for his underlying significant bilateral knee patellofemoral arthritis with less degrees of weightbearing osteoarthritis. 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 Once again they are in the process and have reopened his [employer] claim but we are awaiting approval for any type of additional steroid injections and because this claim is not yet allowed for underlying osteoarthritis which is what chondromalacia is, he is yet to be approved for repeat viscosupplementation which we have been doing on him since 2014 and was never a problem up until [employer] inadvertently closed his case during the coronavirus pandemic. He continues have crepitation and popping difficulty with distance walking going up and down stairs squatting and kneeling. He has continued with his Celebrex. He does work on his home-based exercise program and does utilize his bracing. He is being seen today for repeat evaluation. He may have noticed some catching on his right knee at times. Michelle Cerda was last seen in the office on 5/11/2021 was continued to have substantial pain to his knees from his underlying previously documented weightbearing osteoarthritis with fairly high-grade patellofemoral arthropathy and chondromalacia. We had submitted previously for the diagnosis of osteoarthritis which was clearly pleasant even on his most recent MRI which is moderate to high-grade at the patellofemoral joint and more moderate in the weightbearing surfaces. He did have some chronic capsulitis and quad/patellar tendon tendinopathy without evidence of new ligamentous or muscular injury. We had gotten approval for bilateral knee steroid injection is continue with his Celebrex with use of his knee brace.  His pain symptoms are varying greatly from a 5-8 out of 10. To this depends on how much he is walking and is walking around. He is still in the process of trying to get his approval for viscosupplementation injections rule out on his claim. Once again these have been allowed for years in the past up until the coronavirus pandemic when his claim was inadvertently closed. We last saw Dary Cunningham in the office on 5/25/2021 for his substantial synovitis related to his underlying knee high-grade patellofemoral arthropathy chondromalacia and weightbearing osteoarthritis. He did receive substantial benefits from his last visit has continued with his anti-inflammatory medications with Celebrex use of his knee brace and has been more compliant with his home-based exercises. He did have a flare several weeks ago requiring a Medrol pack. We finally got through his [employer] claim with allowances of his underlying osteoarthritis and have gotten approval once again for viscosupplementation with Euflexxa which we have been doing for a number of years on him. Dary Cunningham was last seen in the office on 7/14/2021 we completed viscosupplementation to his knees bilaterally with Euflexxa. He did reasonably well up until the past couple weeks as weather is gotten colder he has had increasing pain primarily to the anterior portion of his knee. Known to have substantial arthritic change. He is trying to work on his exercise program does utilize his knee brace and is continue with his Celebrex. Denies locking catching or true instability symptoms. Attest: I have reviewed and attest the documentation of the HPI documented by my . I will make any changes if necessary. Enc Date: 11/23/2021  Time: 8:25 AM  Provider: Chong Halsted, MD        Social History     Tobacco Use    Smoking status: Never Smoker    Smokeless tobacco: Never Used   Substance Use Topics    Alcohol use:  Yes     Alcohol/week: 0.0 standard drinks     Comment: occasionally    Drug use: No        Review of Systems  Pertinent items are noted in HPI  Review of systems reviewed from Patient History Form dated on 12/17/2019 and available in the patient's chart under the Media tab. Vital Signs     Ht 6' (1.829 m)   Wt 225 lb (102.1 kg)   BMI 30.52 kg/m²       General Exam:   Constitutional: Patient is adequately groomed with no evidence of malnutrition  DTRs: Deep tendon reflexes are intact  Mental Status: The patient is oriented to time, place and person. The patient's mood and affect are appropriate. Lymphatic: The lymphatic examination bilaterally reveals all areas to be without enlargement or induration. Vascular: Examination reveals no swelling or calf tenderness. Peripheral pulses are palpable and 2+. Neurological: The patient has good coordination. There is no weakness or sensory deficit. Knee Examination  Inspection:  He is in slight varus and has trace  effusions bilaterally. There is marked patellofemoral crepitation. No high-grade malalignment.     Palpation: He does have recurrent tenderness to palpation over the medial joint line and also over the retropatellar joint. He does have a mildly  positive grind testing. This is certainly more prominent than last week. He is most tender along the anterior 3rd medial joint lines. He rates this at about of 5-6 out of 10.     Rang of Motion:  Range of motion -5-120 bilaterally.     Strength:  Strength testing 4+ out of 5 knee flexion extension.     Special Tests:  He is a moderately positive grind test and pain with medial Helen's. I do not feel high-grade click but certainly this worsens his pain bilaterally. This is remarkable for crepitation than actual pop or click. No evidence of instability.     Skin: There are no rashes, ulcerations or lesions.  Distal neurovascular exam is intact.     Gait:  Mild altalgia     Reflex reflexes are preserved her     Additional Comments:      Additional Examinations:  Right Lower Extremity: Examination of the right lower extremity does not show any tenderness, deformity or injury. Range of motion is unremarkable. There is no gross instability. There are no rashes, ulcerations or lesions. Strength and tone are normal.  Left Lower Extremity: Examination of the left lower extremity does not show any tenderness, deformity or injury. Range of motion is unremarkable. There is no gross instability. There are no rashes, ulcerations or lesions. Strength and tone are normal.  Lower Back: Examination of the lower back does not show any tenderness, deformity or injury. Range of motion is unremarkable. There is no gross instability. There are no rashes, ulcerations or lesions. Strength and tone are normal.  Examination of the bilateral hip reveals intact skin. The patient demonstrates full painless range of motion with regards to flexion, abduction, internal and external rotation. There is not tenderness about the greater trochanter. There is a negative straight leg raise against resistance. Strength is 5/5 thorough out all planes.        Diagnostic Test Findings: Bilateral knee MRIs performed 5/5/2017 as listed above     Right knee MRI 5/5/2017  CONCLUSION:    1. Grade 3-4 chondromalacia of lateral facet of the patella with foci of osteochondral erosion    and chondral fissuring. 2. No meniscal tear or acute ligamentous injury. 3. Mild chronic lateral capsulitis and popliteal tendinopathy. 4. Enthesopathy of superior patellar tendon, no tear.       Left knee MRI 5/5/2017  CONCLUSION:    1. Stable grade 3-4 chondromalacia of lateral facet of the patella with focal osteochondral    erosion, chondral thinning and fissuring. Grade 3 chondromalacia of superolateral trochlea with    chondral thinning and fissuring. 2. Stable chronic mild-moderate tendinopathy of superior patellar tendon with a 2cm slit-like    interstitial tear.     3. Progressed lateral capsulitis with chronic moderate tendinopathy of popliteal tendon. Does    the patient have gout? 4. 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 5. Stable grade 2 chondromalacia of weightbearing medial femorotibial compartment.      MRI performed at [facility] 2021 is listed above  Narrative   Site: [facility] Rad #: [id number] #: [id number] Procedure: MR Right Knee w/o Contrast ; Reason for Exam: evaluate weight bearing osteoarthritis. previously documented grade 3-4 chondromalacia of patella. rule out medial meniscus tear   This document is confidential medical information.  Unauthorized disclosure or use of this information is prohibited by law. If you are not the intended recipient of this document, please advise us by calling immediately [phone]. [address], [address] [place] [address]           Patient Name: Dewey Gaines   Case ID: [id number]   Patient : 1970   Referring Physician: Carlos Li MD   Exam Date: 2021   Exam Description: MR Right Knee w/o Contrast            HISTORY:  Evaluate weightbearing osteoarthritis. Previously documented grade 3-4 chondromalacia    of patella.  Evaluate for medial meniscus tear.       TECHNICAL FACTORS:  Long- and short-axis fat- and water-weighted images were performed.       COMPARISON:  None.       FINDINGS:  ACL, PCL, LCL, MCL, flexor mechanism, and extensor mechanism are intact.       Thickened MCL.  Sprain, scarring and capsulitis present.       No fracture, AVN, or mass demonstrated.       Patellar and quadriceps tendons are intact.  Mild patellar and quadriceps tendon thickening at    the patellar attachment.  Traction related bony hypertrophy present.       Inflammation present within Hoffa's fat.  Traction related bony hypertrophy involves the    anterior tibial tubercle.       CONCLUSION:   1. Intermediate-grade to high-grade patellofemoral compartment chondromalacia. 2. Thickened MCL.  Chronic sprain, scarring and capsulitis present. 3. No meniscal or cruciate tear. 4. No fracture, AVN or mass. 5. Distal quadriceps and proximal patellar tendon tendinopathy at their attachment to the    patella.  No tear.       Thank you for the opportunity to provide your interpretation.               Sherry Nogueira MD       A: GM/mg 05/20/2021 10:00 PM   T: MG 05/20/2021 7:59 PM       We did do updated bilateral knee AP and PA weightbearing sunrise and lateral films in the office on 5/11/2021 which does show stable appearance to his underlying patellofemoral arthropathy with mild to moderate narrowing of his medial compartments. Assessment & Plan:    Encounter Diagnoses   Name Primary?  Primary osteoarthritis of both knees Yes    Chondromalacia of both patellae     Chronic pain of both knees        No orders of the defined types were placed in this encounter. Treatment Plan:  Treatment options were discussed with Steven Moss. Clinically at this point he Is feeling better compared to his last visit. His knees have become a little bit more achy recently but reports no interim history of actual injury or no activity prior to becoming symptomatic. With his worsening pain over the last few weeks, we did place him on a Medrol Dosepak to be followed by resuming his Celebrex 200 mg daily. He will continue with his home exercise program and bracing. We will try to file a C9 for bilateral steroid injections and hopefully these can be approved expeditiously and he is eligible for repeat bilateral knee Euflexxa after 1/14/2022. Icing and activity modification was discussed. He will contact us in the interim with questions or concerns. This dictation was performed with a verbal recognition program (DRAGON) and it was checked for errors. It is possible that there are still dictated errors within this office note.  If so, please bring any errors to my attention for an addendum. All efforts were made to ensure that this office note is accurate.

## 2021-12-14 RX ORDER — EMPAGLIFLOZIN 10 MG/1
TABLET, FILM COATED ORAL
Qty: 90 TABLET | Refills: 3 | Status: SHIPPED | OUTPATIENT
Start: 2021-12-14

## 2021-12-14 NOTE — TELEPHONE ENCOUNTER
Last appointment: 3/10/2021  Next appointment: Visit date not found  Last refill: 12/11/2020  Sent [employer] message to schedule due/overdue appointment.

## 2021-12-16 ENCOUNTER — TELEPHONE (OUTPATIENT)
Dept: ORTHOPEDIC SURGERY | Age: 51
End: 2021-12-16

## 2022-01-11 ENCOUNTER — OFFICE VISIT (OUTPATIENT)
Dept: ORTHOPEDIC SURGERY | Age: 52
End: 2022-01-11
Payer: OTHER GOVERNMENT

## 2022-01-11 VITALS — BODY MASS INDEX: 30.52 KG/M2 | HEIGHT: 72 IN

## 2022-01-11 DIAGNOSIS — M22.41 CHONDROMALACIA OF BOTH PATELLAE: ICD-10-CM

## 2022-01-11 DIAGNOSIS — M22.42 CHONDROMALACIA OF BOTH PATELLAE: ICD-10-CM

## 2022-01-11 DIAGNOSIS — G89.29 CHRONIC PAIN OF BOTH KNEES: ICD-10-CM

## 2022-01-11 DIAGNOSIS — M17.0 PRIMARY OSTEOARTHRITIS OF BOTH KNEES: Primary | ICD-10-CM

## 2022-01-11 DIAGNOSIS — M25.561 CHRONIC PAIN OF BOTH KNEES: ICD-10-CM

## 2022-01-11 DIAGNOSIS — M25.562 CHRONIC PAIN OF BOTH KNEES: ICD-10-CM

## 2022-01-11 PROCEDURE — 20610 DRAIN/INJ JOINT/BURSA W/O US: CPT | Performed by: FAMILY MEDICINE

## 2022-01-11 RX ORDER — HYALURONATE SODIUM 10 MG/ML
40 SYRINGE (ML) INTRAARTICULAR ONCE
Status: COMPLETED | OUTPATIENT
Start: 2022-01-11 | End: 2022-01-11

## 2022-01-11 RX ADMIN — Medication 40 MG: at 13:57

## 2022-01-11 NOTE — PROGRESS NOTES
Chief Complaint  Knee Pain (EUFLEXXA #1 BILATERAL KNEES)    followup recurrent bilateral knee pain with substantial patellofemoral arthropathy with underlying knee osteoarthritis with synovitis and consideration of repeat viscosupplementation    Elmira Richey is a 46 y.o. male Kate Ochoa is a 64-year-old white male type II diabetic who is a walking  for the [employer]. He is being seen back today for follow-up on his bilateral knee symptomatic chondromalacia patella with underlying knee osteoarthritis. He last completed his Visco supplementation on 7/13/2021 which did provide very good pain relief for him but over the last few weeks with the weather change he has become a little bit more sore primarily anteriorly involving his knees. .      History of Present Illness for Follow Up Patient:      Kate Ochoa is being seen in follow up today for [facility] claim for chronic bilateral knee pain. The patient rates their current pain as a 5-6 out of 10 on the pain scale. Activities aggravating current symptoms include walking long durations and after sitting for long periods of time. Activities relieving current symptoms include rest. Conservative treatment to date includes: rest, ice, NSAID X2 100 mg daily, knee bracing, home exercises, cortisone injections and Euflexxa most recently completed on 5/21/2019. Denies locking catching or true instability symptoms. He has continue with his exercise program does utilize his knee braces periodically. He is continued to lose weight and has lost about 50 pounds. Denies locking catching or instability symptoms. Claudia Melvin was last seen in the office on 12/17/2019 for follow-up on his bilateral knee significant osteoarthritis with patellofemoral arthropathy. His last round of viscosupplementation was completed on 5/21/2019 and provided very good pain relief for him.   He is continue with his anti-inflammatories most.  Increasing his home-based exercise program. He is continue to lose weight and has lost 50 pounds and has maintained this. Distance walking going up and down stairs can produce pain in the range of 6-7 out of 10 and he does have discomfort which is more achy in nature anteriorly and anterior medially with weather changes. Stairclimbing is also problematic. He does try to perform his home-based exercises. Apparently be did try to admit for repeat steroid injections and viscosupplementation earlier this year however this was denied because [employer] apparently had inadvertently closed his claim due to the coronavirus pandemic. It took him 3 days to get that straightened out he is seen today for evaluation complaining of increasing pain and discomfort involving his knees. He is also having pain at night episodically. At baseline his symptoms are more achy but can be more sharp towards in the day or if he is doing lots of stairs. Ama bennett last seen in the office on 10/22/2020 was continued on conservative treatment for his underlying significant bilateral knee patellofemoral arthropathy with osteoarthritis. As noted previously for some reason tried to submit for repeat steroid injections and viscosupplementation she has helped him substantially for years but he will be secondary to the coronavirus outbreak, for some reason his claim was closed. He is in the process of getting this reopened and we have gotten approval for the steroid injections however there has been some underlying difficulty in getting approval for viscosupplementation once again. We have been doing this for many years and this is helped him substantially. He has continued with his weight loss and has lost over 50 to 60 pounds and has maintained this. He is still having pain with repetitive stair climbing and distance walking and he is a walking .   He does have crepitation and popping but does work on his home-based exercise program and has continued with his anti-inflammatories. Kita Cunningham was last seen in the office on 11/3/2020 was continued on his conservative treatment for his underlying significant bilateral knee patellofemoral arthritis with less degrees of weightbearing osteoarthritis. 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 Once again they are in the process and have reopened his [employer] claim but we are awaiting approval for any type of additional steroid injections and because this claim is not yet allowed for underlying osteoarthritis which is what chondromalacia is, he is yet to be approved for repeat viscosupplementation which we have been doing on him since 2014 and was never a problem up until [employer] inadvertently closed his case during the coronavirus pandemic. He continues have crepitation and popping difficulty with distance walking going up and down stairs squatting and kneeling. He has continued with his Celebrex. He does work on his home-based exercise program and does utilize his bracing. He is being seen today for repeat evaluation. He may have noticed some catching on his right knee at times. Kita Cunningham was last seen in the office on 5/11/2021 was continued to have substantial pain to his knees from his underlying previously documented weightbearing osteoarthritis with fairly high-grade patellofemoral arthropathy and chondromalacia. We had submitted previously for the diagnosis of osteoarthritis which was clearly pleasant even on his most recent MRI which is moderate to high-grade at the patellofemoral joint and more moderate in the weightbearing surfaces. He did have some chronic capsulitis and quad/patellar tendon tendinopathy without evidence of new ligamentous or muscular injury.  We had gotten approval for bilateral knee steroid injection is continue with his Celebrex with use of his knee brace. His pain symptoms are varying greatly from a 5-8 out of 10. To this depends on how much he is walking and is walking around. He is still in the process of trying to get his approval for viscosupplementation injections rule out on his claim. Once again these have been allowed for years in the past up until the coronavirus pandemic when his claim was inadvertently closed. We last saw Jane Closs in the office on 5/25/2021 for his substantial synovitis related to his underlying knee high-grade patellofemoral arthropathy chondromalacia and weightbearing osteoarthritis. He did receive substantial benefits from his last visit has continued with his anti-inflammatory medications with Celebrex use of his knee brace and has been more compliant with his home-based exercises. He did have a flare several weeks ago requiring a Medrol pack. We finally got through his [employer] claim with allowances of his underlying osteoarthritis and have gotten approval once again for viscosupplementation with Euflexxa which we have been doing for a number of years on him. Jane Closs was last seen in the office on 11/23/2021 and was continued on treatment for his underlying high-grade bilateral knee patellofemoral arthropathy with weightbearing osteoarthritis. He has become a little bit more sore recently but did get benefit from his Medrol pack and he continues with his Celebrex and has been more compliant with his home-based exercise program and does utilize his knee braces. He has been approved once again for viscosupplementation which she has done well with in the past and denies locking or catching. 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 Denies locking catching or true instability symptoms.     Attest: I have reviewed and attest the documentation of the HPI documented by my . I will make any changes if necessary. Enc Date: 1/11/2022  Time: 2:08 PM  Provider: Vitor Ruano MD        Social History     Tobacco Use    Smoking status: Never Smoker    Smokeless tobacco: Never Used   Substance Use Topics    Alcohol use: Yes     Alcohol/week: 0.0 standard drinks     Comment: occasionally    Drug use: No        Review of Systems  Pertinent items are noted in HPI  Review of systems reviewed from Patient History Form dated on 12/17/2019 and available in the patient's chart under the Media tab. Vital Signs     Ht 6' (1.829 m)   BMI 30.52 kg/m²       General Exam:   Constitutional: Patient is adequately groomed with no evidence of malnutrition  DTRs: Deep tendon reflexes are intact  Mental Status: The patient is oriented to time, place and person. The patient's mood and affect are appropriate. Lymphatic: The lymphatic examination bilaterally reveals all areas to be without enlargement or induration. Vascular: Examination reveals no swelling or calf tenderness. Peripheral pulses are palpable and 2+. Neurological: The patient has good coordination. There is no weakness or sensory deficit. Knee Examination  Inspection:  He is in slight varus and has trace  effusions bilaterally. There is marked patellofemoral crepitation. No high-grade malalignment.     Palpation: He has improvements in his tenderness to palpation over the medial joint line and also over the retropatellar joint. He does have a mildly  positive grind testing. This is certainly more prominent than last week. He is most tender along the anterior 3rd medial joint lines. He rates this at about of 5-6 out of 10.     Rang of Motion:  Range of motion -5-120 bilaterally.     Strength:  Strength testing 4+ out of 5 knee flexion extension.     Special Tests:  He is a mildly positive grind test and pain with medial Helen's.   I do not feel high-grade click but certainly this worsens his pain bilaterally. This is remarkable for crepitation than actual pop or click. No evidence of instability.     Skin: There are no rashes, ulcerations or lesions. Distal neurovascular exam is intact.     Gait: Improved gait.     Reflex reflexes are preserved her     Additional Comments:      Additional Examinations:  Right Lower Extremity: Examination of the right lower extremity does not show any tenderness, deformity or injury. Range of motion is unremarkable. There is no gross instability. There are no rashes, ulcerations or lesions. Strength and tone are normal.  Left Lower Extremity: Examination of the left lower extremity does not show any tenderness, deformity or injury. Range of motion is unremarkable. There is no gross instability. There are no rashes, ulcerations or lesions. Strength and tone are normal.  Lower Back: Examination of the lower back does not show any tenderness, deformity or injury. Range of motion is unremarkable. There is no gross instability. There are no rashes, ulcerations or lesions. Strength and tone are normal.  Examination of the bilateral hip reveals intact skin. The patient demonstrates full painless range of motion with regards to flexion, abduction, internal and external rotation. There is not tenderness about the greater trochanter. There is a negative straight leg raise against resistance. Strength is 5/5 thorough out all planes.        Diagnostic Test Findings: Bilateral knee MRIs performed 5/5/2017 as listed above     Right knee MRI 5/5/2017  CONCLUSION:    1. Grade 3-4 chondromalacia of lateral facet of the patella with foci of osteochondral erosion    and chondral fissuring. 2. No meniscal tear or acute ligamentous injury. 3. Mild chronic lateral capsulitis and popliteal tendinopathy.     4. Enthesopathy of superior patellar tendon, no tear.       Left knee MRI 5/5/2017  CONCLUSION:    1. Stable grade 3-4 chondromalacia of lateral facet of the patella with focal osteochondral    erosion, chondral thinning and fissuring. Grade 3 chondromalacia of superolateral trochlea with    chondral thinning and fissuring. 2. Stable chronic mild-moderate tendinopathy of superior patellar tendon with a 2cm slit-like    interstitial tear. 3. Progressed lateral capsulitis with chronic moderate tendinopathy of popliteal tendon. Does    the patient have gout? 4. 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 5. Stable grade 2 chondromalacia of weightbearing medial femorotibial compartment.      MRI performed at [facility] 2021 is listed above  Narrative   Site: [facility] Rad #: [id number] #: [id number] Procedure: MR Right Knee w/o Contrast ; Reason for Exam: evaluate weight bearing osteoarthritis. previously documented grade 3-4 chondromalacia of patella. rule out medial meniscus tear   This document is confidential medical information.  Unauthorized disclosure or use of this information is prohibited by law. If you are not the intended recipient of this document, please advise us by calling immediately [phone]. [address] [facility], [address] [place] [address]           Patient Name: Arthur Daniels   Case ID: [id number]   Patient : 1970   Referring Physician: Jackelin Blackwood MD   Exam Date: 2021   Exam Description: MR Right Knee w/o Contrast            HISTORY:  Evaluate weightbearing osteoarthritis.  Previously documented grade 3-4 chondromalacia    of patella.  Evaluate for medial meniscus tear.       TECHNICAL FACTORS:  Long- and short-axis fat- and water-weighted images were performed.       COMPARISON:  None.       FINDINGS:  ACL, PCL, LCL, MCL, flexor mechanism, and extensor mechanism are intact.       Thickened MCL.  Sprain, scarring and capsulitis present.       No fracture, AVN, or mass demonstrated.       Patellar and quadriceps tendons are intact.  Mild patellar and quadriceps tendon thickening at    the patellar attachment.  Traction related bony hypertrophy present.       Inflammation present within Hoffa's fat.  Traction related bony hypertrophy involves the    anterior tibial tubercle.       CONCLUSION:   1. Intermediate-grade to high-grade patellofemoral compartment chondromalacia. 2. Thickened MCL.  Chronic sprain, scarring and capsulitis present. 3. No meniscal or cruciate tear. 4. No fracture, AVN or mass. 5. Distal quadriceps and proximal patellar tendon tendinopathy at their attachment to the    patella.  No tear.       Thank you for the opportunity to provide your interpretation.               Rosa Coley MD       A: GM/mg 05/20/2021 10:00 PM   T: MG 05/20/2021 7:59 PM       We did do updated bilateral knee AP and PA weightbearing sunrise and lateral films in the office on 5/11/2021 which does show stable appearance to his underlying patellofemoral arthropathy with mild to moderate narrowing of his medial compartments. Assessment & Plan:    Encounter Diagnoses   Name Primary?  Primary osteoarthritis of both knees Yes    Chondromalacia of both patellae     Chronic pain of both knees        Orders Placed This Encounter   Procedures    20610 - HI DRAIN/INJECT LARGE JOINT/BURSA         Treatment Plan:  Treatment options were discussed with Mari Vasquez. Clinically at this point he Is feeling better compared to his last visit. His knees have become a little bit more achy recently but reports no interim history of actual injury or no activity prior to becoming symptomatic. After discussion of pros and cons of viscosupplementation, he did receive his first injection to his knees with Euflexxa today bilaterally. This was performed using a standard prefilled 2 cc syringe. He will continue with his home-based patellar protection program and his Celebrex. He completed a Medrol Dosepak in November 2021.  utes. Icing and activity modification was discussed.

## 2022-01-25 ENCOUNTER — OFFICE VISIT (OUTPATIENT)
Dept: ORTHOPEDIC SURGERY | Age: 52
End: 2022-01-25
Payer: OTHER GOVERNMENT

## 2022-01-25 VITALS — WEIGHT: 225 LBS | RESPIRATION RATE: 15 BRPM | BODY MASS INDEX: 30.48 KG/M2 | HEIGHT: 72 IN

## 2022-01-25 DIAGNOSIS — M25.562 CHRONIC PAIN OF BOTH KNEES: ICD-10-CM

## 2022-01-25 DIAGNOSIS — M17.0 PRIMARY OSTEOARTHRITIS OF BOTH KNEES: Primary | ICD-10-CM

## 2022-01-25 DIAGNOSIS — G89.29 CHRONIC PAIN OF BOTH KNEES: ICD-10-CM

## 2022-01-25 DIAGNOSIS — M25.561 CHRONIC PAIN OF BOTH KNEES: ICD-10-CM

## 2022-01-25 DIAGNOSIS — M22.41 CHONDROMALACIA OF BOTH PATELLAE: ICD-10-CM

## 2022-01-25 DIAGNOSIS — M22.42 CHONDROMALACIA OF BOTH PATELLAE: ICD-10-CM

## 2022-01-25 PROCEDURE — 20610 DRAIN/INJ JOINT/BURSA W/O US: CPT | Performed by: FAMILY MEDICINE

## 2022-01-25 RX ORDER — METHYLPREDNISOLONE 4 MG/1
TABLET ORAL
Qty: 21 KIT | Refills: 0 | Status: SHIPPED | OUTPATIENT
Start: 2022-01-25 | End: 2022-05-06 | Stop reason: ALTCHOICE

## 2022-01-25 RX ORDER — HYALURONATE SODIUM 10 MG/ML
40 SYRINGE (ML) INTRAARTICULAR ONCE
Status: COMPLETED | OUTPATIENT
Start: 2022-01-25 | End: 2022-01-25

## 2022-01-25 RX ADMIN — Medication 40 MG: at 13:38

## 2022-01-25 NOTE — PROGRESS NOTES
CC:  FU Knee Osteoarthritis with Viscosupplementation      followup recurrent bilateral knee pain with substantial patellofemoral arthropathy with underlying knee osteoarthritis with synovitis and consideration of repeat viscosupplementation    Arpita George is a 46 y.o. male Shar Rocha is a 49-year-old white male type II diabetic who is a walking  for the [employer]. He is being seen back today for follow-up on his bilateral knee symptomatic chondromalacia patella with underlying knee osteoarthritis. He last completed his Visco supplementation on 7/13/2021 which did provide very good pain relief for him but over the last few weeks with the weather change he has become a little bit more sore primarily anteriorly involving his knees. .      History of Present Illness for Follow Up Patient:      Shar Rocha is being seen in follow up today for [facility] claim for chronic bilateral knee pain. The patient rates their current pain as a 5-6 out of 10 on the pain scale. Activities aggravating current symptoms include walking long durations and after sitting for long periods of time. Activities relieving current symptoms include rest. Conservative treatment to date includes: rest, ice, NSAID X2 100 mg daily, knee bracing, home exercises, cortisone injections and Euflexxa most recently completed on 5/21/2019. Denies locking catching or true instability symptoms. He has continue with his exercise program does utilize his knee braces periodically. He is continued to lose weight and has lost about 50 pounds. Denies locking catching or instability symptoms. Gurdeep Baker was last seen in the office on 12/17/2019 for follow-up on his bilateral knee significant osteoarthritis with patellofemoral arthropathy. His last round of viscosupplementation was completed on 5/21/2019 and provided very good pain relief for him.   He is continue with his anti-inflammatories most.  Increasing his home-based exercise program. He is continue to lose weight and has lost 50 pounds and has maintained this. Distance walking going up and down stairs can produce pain in the range of 6-7 out of 10 and he does have discomfort which is more achy in nature anteriorly and anterior medially with weather changes. Stairclimbing is also problematic. He does try to perform his home-based exercises. Apparently be did try to admit for repeat steroid injections and viscosupplementation earlier this year however this was denied because [employer] apparently had inadvertently closed his claim due to the coronavirus pandemic. It took him 3 days to get that straightened out he is seen today for evaluation complaining of increasing pain and discomfort involving his knees. He is also having pain at night episodically. At baseline his symptoms are more achy but can be more sharp towards in the day or if he is doing lots of stairs. Leanne bennett last seen in the office on 10/22/2020 was continued on conservative treatment for his underlying significant bilateral knee patellofemoral arthropathy with osteoarthritis. As noted previously for some reason tried to submit for repeat steroid injections and viscosupplementation she has helped him substantially for years but he will be secondary to the coronavirus outbreak, for some reason his claim was closed. He is in the process of getting this reopened and we have gotten approval for the steroid injections however there has been some underlying difficulty in getting approval for viscosupplementation once again. We have been doing this for many years and this is helped him substantially. He has continued with his weight loss and has lost over 50 to 60 pounds and has maintained this. He is still having pain with repetitive stair climbing and distance walking and he is a walking .   He does have crepitation and popping but does work on his home-based exercise program and has continued with his anti-inflammatories. Jay Koroma was last seen in the office on 11/3/2020 was continued on his conservative treatment for his underlying significant bilateral knee patellofemoral arthritis with less degrees of weightbearing osteoarthritis. 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 Once again they are in the process and have reopened his [employer] claim but we are awaiting approval for any type of additional steroid injections and because this claim is not yet allowed for underlying osteoarthritis which is what chondromalacia is, he is yet to be approved for repeat viscosupplementation which we have been doing on him since 2014 and was never a problem up until [employer] inadvertently closed his case during the coronavirus pandemic. He continues have crepitation and popping difficulty with distance walking going up and down stairs squatting and kneeling. He has continued with his Celebrex. He does work on his home-based exercise program and does utilize his bracing. He is being seen today for repeat evaluation. He may have noticed some catching on his right knee at times. Jay Koroma was last seen in the office on 5/11/2021 was continued to have substantial pain to his knees from his underlying previously documented weightbearing osteoarthritis with fairly high-grade patellofemoral arthropathy and chondromalacia. We had submitted previously for the diagnosis of osteoarthritis which was clearly pleasant even on his most recent MRI which is moderate to high-grade at the patellofemoral joint and more moderate in the weightbearing surfaces. He did have some chronic capsulitis and quad/patellar tendon tendinopathy without evidence of new ligamentous or muscular injury.  We had gotten approval for bilateral knee steroid injection is continue with his Celebrex with use of his knee brace. His pain symptoms are varying greatly from a 5-8 out of 10. To this depends on how much he is walking and is walking around. He is still in the process of trying to get his approval for viscosupplementation injections rule out on his claim. Once again these have been allowed for years in the past up until the coronavirus pandemic when his claim was inadvertently closed. We last saw Vane Quijano in the office on 5/25/2021 for his substantial synovitis related to his underlying knee high-grade patellofemoral arthropathy chondromalacia and weightbearing osteoarthritis. He did receive substantial benefits from his last visit has continued with his anti-inflammatory medications with Celebrex use of his knee brace and has been more compliant with his home-based exercises. He did have a flare several weeks ago requiring a Medrol pack. We finally got through his [employer] claim with allowances of his underlying osteoarthritis and have gotten approval once again for viscosupplementation with Euflexxa which we have been doing for a number of years on him. Vane Quijano was last seen in the office on 11/23/2021 and was continued on treatment for his underlying high-grade bilateral knee patellofemoral arthropathy with weightbearing osteoarthritis. He has become a little bit more sore recently but did get benefit from his Medrol pack and he continues with his Celebrex and has been more compliant with his home-based exercise program and does utilize his knee braces. He has been approved once again for viscosupplementation which she has done well with in the past and denies locking or catching. 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 Denies locking catching or true instability symptoms.     Vane Quijano was last seen in the office on 1/11/2022 we did start viscosupplementation to his knees bilaterally with Euflexxa. Overall he has not noticed a substantial improvement in his knee pain symptoms and has been quite active with walking at work but there is no interim history of injury. He is here today for his second Euflexxa injection and still is having pain with distance walking stairclimbing at 7 out of 10. He has been taking his Celebrex consistently and does utilizes knee brace. He attributes much of his symptoms to having to work extended hours as they are short staffed. Denies locking catching or true instability symptoms. He is typically done very well with viscosupplementation in the past.      PE: no substantial change in exam.    Assessment:  Knee Osteoarthritis with viscosupplementation      PROCEDURE NOTE:    PRE-PROCEDURE DIAGNOSIS: DJD knee    POST-PROCEDURE DIAGNOSIS: DJD knee    Injection #2 of Euflexxa bilaterally. PROCEDURE:  With the patient's permission, his bilaterally knee was prepped in standard sterile fashion with Betadine and Alcohol and the prefilled 2cc injection of Euflexxa was injected intra-articularly into the bilaterally knee via a superolateral approach without difficulty. The patient tolerated this well without difficulty. A band-aid was applied. POST-PROCEDURE INSTRUCTIONS GIVEN TO PATIENT: The patient was advised to ice the knee for 15-20 minutes to relieve any injection site related pain. FOLLOW-UP: as directed. He will continue the Celebrex 200 mg daily as well as his home-based exercise program and knee bracing. With his worsening pain from working extended hours, we will repeat his Medrol Dosepak to be followed by resuming his Celebrex. We will see him back next week to finish up his Euflexxa series but he will contact us in the interim with questions or concerns.

## 2022-02-01 ENCOUNTER — OFFICE VISIT (OUTPATIENT)
Dept: ORTHOPEDIC SURGERY | Age: 52
End: 2022-02-01
Payer: OTHER GOVERNMENT

## 2022-02-01 DIAGNOSIS — G89.29 CHRONIC PAIN OF BOTH KNEES: ICD-10-CM

## 2022-02-01 DIAGNOSIS — M17.0 PRIMARY OSTEOARTHRITIS OF BOTH KNEES: Primary | ICD-10-CM

## 2022-02-01 DIAGNOSIS — M22.42 CHONDROMALACIA OF BOTH PATELLAE: ICD-10-CM

## 2022-02-01 DIAGNOSIS — M25.561 CHRONIC PAIN OF BOTH KNEES: ICD-10-CM

## 2022-02-01 DIAGNOSIS — M25.562 CHRONIC PAIN OF BOTH KNEES: ICD-10-CM

## 2022-02-01 DIAGNOSIS — M22.41 CHONDROMALACIA OF BOTH PATELLAE: ICD-10-CM

## 2022-02-01 PROCEDURE — 20610 DRAIN/INJ JOINT/BURSA W/O US: CPT | Performed by: FAMILY MEDICINE

## 2022-02-01 RX ORDER — HYALURONATE SODIUM 10 MG/ML
40 SYRINGE (ML) INTRAARTICULAR ONCE
Status: COMPLETED | OUTPATIENT
Start: 2022-02-01 | End: 2022-02-01

## 2022-02-01 RX ADMIN — Medication 40 MG: at 13:38

## 2022-02-01 NOTE — PROGRESS NOTES
CC:  FU Knee Osteoarthritis with Viscosupplementation      followup recurrent bilateral knee pain with substantial patellofemoral arthropathy with underlying knee osteoarthritis with synovitis and consideration of repeat viscosupplementation    Lowell Cuellar is a 46 y.o. male Saurav Boone is a 53-year-old white male type II diabetic who is a walking  for the [employer]. He is being seen back today for follow-up on his bilateral knee symptomatic chondromalacia patella with underlying knee osteoarthritis. He last completed his Visco supplementation on 7/13/2021 which did provide very good pain relief for him but over the last few weeks with the weather change he has become a little bit more sore primarily anteriorly involving his knees. .      History of Present Illness for Follow Up Patient:        Saurav Boone was last seen in the office on 11/23/2021 and was continued on treatment for his underlying high-grade bilateral knee patellofemoral arthropathy with weightbearing osteoarthritis. He has become a little bit more sore recently but did get benefit from his Medrol pack and he continues with his Celebrex and has been more compliant with his home-based exercise program and does utilize his knee braces. He has been approved once again for viscosupplementation which she has done well with in the past and denies locking or catching. 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 Denies locking catching or true instability symptoms. Saurav Boone was last seen in the office on 1/11/2022 we did start viscosupplementation to his knees bilaterally with Euflexxa. Overall he has not noticed a substantial improvement in his knee pain symptoms and has been quite active with walking at work but there is no interim history of injury.   He is here today for his second Euflexxa injection and still is having pain with distance walking stairclimbing at 7 out of 10. He has been taking his Celebrex consistently and does utilizes knee brace. He attributes much of his symptoms to having to work extended hours as they are short staffed. Denies locking catching or true instability symptoms. He is typically done very well with viscosupplementation in the past.    Saurav Boone was last seen in the office on 1/25/2022 and is here today to complete his Euflexxa injections to his knees bilaterally. He has begun noticing improvement in his overall knee pain symptoms although still is working extended hours given their short staffing at work. He is here today for his final Euflexxa injection and states his pain with distance walking and stair climbing is more the range of 3 out of 10. He does take his Celebrex and does utilize his knee braces and his work and his home-based exercises. He once again has done very well with keeping his weight off. Denies locking catching or true instability symptoms      PE: no substantial change in exam.    Assessment:  Knee Osteoarthritis with viscosupplementation      PROCEDURE NOTE:    PRE-PROCEDURE DIAGNOSIS: DJD knee    POST-PROCEDURE DIAGNOSIS: DJD knee    Injection #3 of Euflexxa bilaterally. PROCEDURE:  With the patient's permission, his bilaterally knee was prepped in standard sterile fashion with Betadine and Alcohol and the prefilled 2cc injection of Euflexxa was injected intra-articularly into the bilaterally knee via a superolateral approach without difficulty. The patient tolerated this well without difficulty. A band-aid was applied. POST-PROCEDURE INSTRUCTIONS GIVEN TO PATIENT: The patient was advised to ice the knee for 15-20 minutes to relieve any injection site related pain. FOLLOW-UP: as directed. He will continue the Celebrex 200 mg daily as well as his home-based exercise program and knee bracing.   He is beginning to feel better. I recommend he continues with the Celebrex 200 mg daily with periodic blood monitoring. He is typically done well with viscosupplementation in the past and is aware that he can have this repeated at 6-month intervals or interim steroid injections if needed but will need to be requested through [employer]. He will contact us in the interim with questions or concerns.

## 2022-02-01 NOTE — LETTER
[facility] [address] [place] [address]  [id number] [employer] [address]  Phone: [phone]  Fax: [phone]    Alice Pizano MD        February 1, 2022     Patient: Arpita George   YOB: 1970   Date of Visit: 2/1/2022       To Whom It May Concern: It is my medical opinion that Evgeny Shah may return to work on 2/1/22. He should refrain from his walking route for the remainder of the day. If you have any questions or concerns, please don't hesitate to call.     Sincerely,        Alice Pizano MD

## 2022-03-13 DIAGNOSIS — G89.29 CHRONIC PAIN OF BOTH KNEES: ICD-10-CM

## 2022-03-13 DIAGNOSIS — M25.561 CHRONIC PAIN OF BOTH KNEES: ICD-10-CM

## 2022-03-13 DIAGNOSIS — M25.562 CHRONIC PAIN OF BOTH KNEES: ICD-10-CM

## 2022-03-13 DIAGNOSIS — M22.42 CHONDROMALACIA OF BOTH PATELLAE: ICD-10-CM

## 2022-03-13 DIAGNOSIS — M17.0 PRIMARY OSTEOARTHRITIS OF BOTH KNEES: ICD-10-CM

## 2022-03-13 DIAGNOSIS — G89.29 CHRONIC PAIN OF RIGHT KNEE: ICD-10-CM

## 2022-03-13 DIAGNOSIS — G89.29 CHRONIC PAIN OF LEFT KNEE: ICD-10-CM

## 2022-03-13 DIAGNOSIS — M25.561 CHRONIC PAIN OF RIGHT KNEE: ICD-10-CM

## 2022-03-13 DIAGNOSIS — M25.562 CHRONIC PAIN OF LEFT KNEE: ICD-10-CM

## 2022-03-13 DIAGNOSIS — M22.41 CHONDROMALACIA OF BOTH PATELLAE: ICD-10-CM

## 2022-03-13 DIAGNOSIS — E78.5 DYSLIPIDEMIA: ICD-10-CM

## 2022-03-14 RX ORDER — PRAVASTATIN SODIUM 80 MG/1
TABLET ORAL
Qty: 30 TABLET | Refills: 0 | OUTPATIENT
Start: 2022-03-14

## 2022-03-14 RX ORDER — CELECOXIB 200 MG/1
CAPSULE ORAL
Qty: 90 CAPSULE | Refills: 2 | Status: SHIPPED | OUTPATIENT
Start: 2022-03-14

## 2022-03-14 NOTE — TELEPHONE ENCOUNTER
Last appointment: 3/10/2021  Next appointment: Visit date not found  Last refill: 12/11/2020  Sent [employer] message to schedule due/overdue appointment.

## 2022-05-06 ENCOUNTER — TELEPHONE (OUTPATIENT)
Dept: INTERNAL MEDICINE CLINIC | Age: 52
End: 2022-05-06

## 2022-05-06 ENCOUNTER — TELEMEDICINE (OUTPATIENT)
Dept: INTERNAL MEDICINE CLINIC | Age: 52
End: 2022-05-06
Payer: COMMERCIAL

## 2022-05-06 ENCOUNTER — HOSPITAL ENCOUNTER (OUTPATIENT)
Dept: MRI IMAGING | Age: 52
Discharge: HOME | End: 2022-05-06
Payer: COMMERCIAL

## 2022-05-06 DIAGNOSIS — R27.8 ACUTE ATAXIA: ICD-10-CM

## 2022-05-06 DIAGNOSIS — R42 DIZZINESS AND GIDDINESS: ICD-10-CM

## 2022-05-06 DIAGNOSIS — R26.89 BALANCE PROBLEM: ICD-10-CM

## 2022-05-06 DIAGNOSIS — R27.8 ACUTE ATAXIA: Primary | ICD-10-CM

## 2022-05-06 DIAGNOSIS — R42 DIZZINESS AND GIDDINESS: Primary | ICD-10-CM

## 2022-05-06 PROCEDURE — 70551 MRI BRAIN STEM W/O DYE: CPT

## 2022-05-06 PROCEDURE — 99442 PR PHYS/QHP TELEPHONE EVALUATION 11-20 MIN: CPT | Performed by: INTERNAL MEDICINE

## 2022-05-06 RX ORDER — ALLOPURINOL 100 MG/1
TABLET ORAL
Qty: 180 TABLET | Refills: 1 | Status: SHIPPED | OUTPATIENT
Start: 2022-05-06

## 2022-05-06 RX ORDER — MECLIZINE HCL 12.5 MG/1
12.5 TABLET ORAL 3 TIMES DAILY PRN
Qty: 30 TABLET | Refills: 0 | Status: SHIPPED | OUTPATIENT
Start: 2022-05-06 | End: 2022-05-16

## 2022-05-06 ASSESSMENT — PATIENT HEALTH QUESTIONNAIRE - PHQ9
SUM OF ALL RESPONSES TO PHQ QUESTIONS 1-9: 0
1. LITTLE INTEREST OR PLEASURE IN DOING THINGS: 0
2. FEELING DOWN, DEPRESSED OR HOPELESS: 0
SUM OF ALL RESPONSES TO PHQ9 QUESTIONS 1 & 2: 0

## 2022-05-06 NOTE — TELEPHONE ENCOUNTER
Place him on the schedule for a phone consultation. We will discuss the matter and I will provide further recommendations.

## 2022-05-06 NOTE — PROGRESS NOTES
Spoke with patient. He is feeling better. He is able to eat without any nausea or vomiting. He will call should symptoms persist, worsen or if new symptoms develop. Meclizine will be called in to be used as needed for the dizziness.

## 2022-05-06 NOTE — TELEPHONE ENCOUNTER
Patient has been having dizziness since Monday and vomited last night. He is requesting someone clinical call him back to discuss his concerns and how he should try to treat at number provided.

## 2022-05-06 NOTE — TELEPHONE ENCOUNTER
Spoke with patient, he has been very dizzy since Monday. It has been getting worse over the last few days. He stated when he gets up to fast the dizziness is really bad. He has only vomited one time and that was last night. Spoke with Dr. Ana Gonzalez verbally and he had be advised the patient to go to the Er. Patient will go and let us know what he says.

## 2022-05-06 NOTE — TELEPHONE ENCOUNTER
Spoke with patient, he is getting dizzy every 10 steps when walking. He did not go to the ER like advised to.

## 2022-05-06 NOTE — PROGRESS NOTES
Caio Grullon is a 45 y.o. male evaluated via telephone on 5/6/2022 for Dizziness (began monday, ) and Emesis (one time last night )  . Chief Complaint   Patient presents with    Dizziness     began monday,     Emesis     one time last night        HPI: This is a 68-year-old white male with history of type 2 diabetes, hypertension, hyperlipidemia who called the office today with complaint of dizziness and unsteadiness and 1 episode of vomiting. The patient has been noncompliant with routine follow-up. He has not been seen in over a year. The patient has not been monitoring his blood sugars. Patient states this past Monday in the morning he developed a sense of unsteadiness after walking for a few steps. He described staggering across the floor. Patient specifically denies any spinning dizziness. Patient also denies lightheadedness, unilateral weakness, paresthesia, speech disturbance, visual disturbance, headache, tinnitus, hearing loss. Last evening he had 1 episode of nausea and vomiting. His blood pressure yesterday was 148/94. Today it was 146/100 with a pulse of 65. Upon questioning he states that when in bed and turned his head to the right he did develop a sense of movement but no spinning per se. The patient was unable to be evaluated in person today. Patient was initially advised to go to the emergency department which she refused. Documentation:  I communicated with the patient and/or health care decision maker about disequilibrium, stroke, vestibular dysfunction. Details of this discussion including any medical advice provided: See below    Encounter Diagnoses   Name Primary?  Acute ataxia Yes    Balance problem     Dizziness and giddiness      Differential diagnosis would definitely include CVA, BPPV, viral labyrinthitis    Total Time: minutes: 11-20 minutes (15 minutes)    Caio Grullon was evaluated through a synchronous (real-time) audio encounter.  Patient identification was verified at the start of the visit. He (or guardian if applicable) is aware that this is a billable service, which includes applicable co-pays. This visit was conducted with the patient's (and/or legal guardian's) verbal consent. He has not had a related appointment within my department in the past 7 days or scheduled within the next 24 hours. The patient was located in a state where the provider was licensed to provide care.     Note: not billable if this call serves to triage the patient into an appointment for the relevant concern    Meliton Pro, DO

## 2022-05-06 NOTE — TELEPHONE ENCOUNTER
Kranthi Cobos with [facility] is calling to report the following STAT MRI of the Brain results, and will fax them as well: \"No acute infarct or other acute intracranial process. \"  \"She states it's basically Negative. \"

## 2022-05-09 ENCOUNTER — TELEPHONE (OUTPATIENT)
Dept: INTERNAL MEDICINE CLINIC | Age: 52
End: 2022-05-09

## 2022-05-09 NOTE — TELEPHONE ENCOUNTER
Patient requesting an appt. for his dizziness. Wants to be see this week. Patient had virtual for this on 5/6. States symptoms are not any worse, just lingering. Ok to take same days, this week? Or are there any other recommendations?

## 2022-06-14 ENCOUNTER — OFFICE VISIT (OUTPATIENT)
Dept: ORTHOPEDIC SURGERY | Age: 52
End: 2022-06-14
Payer: OTHER GOVERNMENT

## 2022-06-14 DIAGNOSIS — M22.41 CHONDROMALACIA OF BOTH PATELLAE: ICD-10-CM

## 2022-06-14 DIAGNOSIS — M17.0 PRIMARY OSTEOARTHRITIS OF BOTH KNEES: ICD-10-CM

## 2022-06-14 DIAGNOSIS — G89.29 CHRONIC PAIN OF BOTH KNEES: ICD-10-CM

## 2022-06-14 DIAGNOSIS — G89.29 CHRONIC PAIN OF LEFT KNEE: Primary | ICD-10-CM

## 2022-06-14 DIAGNOSIS — M25.562 CHRONIC PAIN OF LEFT KNEE: Primary | ICD-10-CM

## 2022-06-14 DIAGNOSIS — M25.562 CHRONIC PAIN OF BOTH KNEES: ICD-10-CM

## 2022-06-14 DIAGNOSIS — M22.42 CHONDROMALACIA OF BOTH PATELLAE: ICD-10-CM

## 2022-06-14 DIAGNOSIS — M25.561 CHRONIC PAIN OF RIGHT KNEE: ICD-10-CM

## 2022-06-14 DIAGNOSIS — M25.561 CHRONIC PAIN OF BOTH KNEES: ICD-10-CM

## 2022-06-14 DIAGNOSIS — G89.29 CHRONIC PAIN OF RIGHT KNEE: ICD-10-CM

## 2022-06-14 PROCEDURE — 20610 DRAIN/INJ JOINT/BURSA W/O US: CPT | Performed by: FAMILY MEDICINE

## 2022-06-14 RX ORDER — LIDOCAINE HYDROCHLORIDE 10 MG/ML
2 INJECTION, SOLUTION INFILTRATION; PERINEURAL ONCE
Status: COMPLETED | OUTPATIENT
Start: 2022-06-14 | End: 2022-06-14

## 2022-06-14 RX ORDER — BUPIVACAINE HYDROCHLORIDE 2.5 MG/ML
4 INJECTION, SOLUTION INFILTRATION; PERINEURAL ONCE
Status: COMPLETED | OUTPATIENT
Start: 2022-06-14 | End: 2022-06-14

## 2022-06-14 RX ORDER — BETAMETHASONE SODIUM PHOSPHATE AND BETAMETHASONE ACETATE 3; 3 MG/ML; MG/ML
24 INJECTION, SUSPENSION INTRA-ARTICULAR; INTRALESIONAL; INTRAMUSCULAR; SOFT TISSUE ONCE
Status: COMPLETED | OUTPATIENT
Start: 2022-06-14 | End: 2022-06-14

## 2022-06-14 RX ADMIN — LIDOCAINE HYDROCHLORIDE 2 ML: 10 INJECTION, SOLUTION INFILTRATION; PERINEURAL at 13:44

## 2022-06-14 RX ADMIN — BETAMETHASONE SODIUM PHOSPHATE AND BETAMETHASONE ACETATE 24 MG: 3; 3 INJECTION, SUSPENSION INTRA-ARTICULAR; INTRALESIONAL; INTRAMUSCULAR; SOFT TISSUE at 13:42

## 2022-06-14 RX ADMIN — BUPIVACAINE HYDROCHLORIDE 10 MG: 2.5 INJECTION, SOLUTION INFILTRATION; PERINEURAL at 13:43

## 2022-06-14 NOTE — PROGRESS NOTES
Chief Complaint  Knee Pain (WC CK BILATERAL KNEES/REQ CORTISONE)    followup recurrent bilateral knee pain with substantial patellofemoral arthropathy with underlying knee osteoarthritis with mild to moderate recurrent synovitis is post completion of the most recent round of viscosupplementation 2/1/2022 using Euflexxa bilateral    Yokasta Jernigan is a 45 y.o. male Michelle Collazo is a 49-year-old white male type II diabetic who is a walking  for the [employer]. He is being seen back today for follow-up on his bilateral knee symptomatic chondromalacia patella with underlying knee osteoarthritis. He last completed his Visco supplementation on 7/13/2021 which did provide very good pain relief for him but over the last few weeks with the weather change he has become a little bit more sore primarily anteriorly involving his knees. .      History of Present Illness for Follow Up Patient:      Michelle Collazo is being seen in follow up today for [facility] claim for chronic bilateral knee pain. The patient rates their current pain as a 5-6 out of 10 on the pain scale. Activities aggravating current symptoms include walking long durations and after sitting for long periods of time. Activities relieving current symptoms include rest. Conservative treatment to date includes: rest, ice, NSAID X2 100 mg daily, knee bracing, home exercises, cortisone injections and Euflexxa most recently completed on 5/21/2019. Denies locking catching or true instability symptoms. He has continue with his exercise program does utilize his knee braces periodically. He is continued to lose weight and has lost about 50 pounds. Denies locking catching or instability symptoms. With I saw Michelle Collazo in the office on 2/1/2022 when we completed his Euflexxa bilaterally for his underlying knee osteoarthritis and chondromalacia patella.   He did reasonably well throughout the remainder of [place] in early spring but over the last several weeks has become a little bit more sore and achy with pain in the range of 4-5 out of 10. 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 He has continued with his anti-inflammatory medications and has continued his weight loss at about 50 pounds and denies locking catching or true instability symptoms. He does continue with his anti-inflammatories and denies locking catching or true instability symptoms. Not be eligible for repeat viscosupplementation until August 2022. Attest: I have reviewed and attest the documentation of the HPI documented by my . I will make any changes if necessary. Enc Date: 6/14/2022  Time: 1:42 PM  Provider: Romina Newton MD        Social History     Tobacco Use    Smoking status: Never Smoker    Smokeless tobacco: Never Used   Substance Use Topics    Alcohol use: Yes     Alcohol/week: 0.0 standard drinks     Comment: occasionally    Drug use: No        Review of Systems  Pertinent items are noted in HPI  Review of systems reviewed from Patient History Form dated on 12/17/2019 and available in the patient's chart under the Media tab. Vital Signs     There were no vitals taken for this visit. General Exam:   Constitutional: Patient is adequately groomed with no evidence of malnutrition  DTRs: Deep tendon reflexes are intact  Mental Status: The patient is oriented to time, place and person. The patient's mood and affect are appropriate. Lymphatic: The lymphatic examination bilaterally reveals all areas to be without enlargement or induration. Vascular: Examination reveals no swelling or calf tenderness. Peripheral pulses are palpable and 2+. Neurological: The patient has good coordination. There is no weakness or sensory deficit. Knee Examination  Inspection:  He is in slight varus and has trace  effusions bilaterally. There is marked patellofemoral crepitation.   No high-grade malalignment.     Palpation: He has mild to moderate recurrent tenderness to palpation over the medial joint line and also over the retropatellar joint. He does have a mildly  positive grind testing. This is certainly more prominent than last week. He is most tender along the anterior 3rd medial joint lines. He rates this at about of 5-6 out of 10.     Rang of Motion:  Range of motion -5-120 bilaterally.     Strength:  Strength testing 4+ out of 5 knee flexion extension.     Special Tests:  He is a mildly to moderately positive grind test and pain with medial Helen's. I do not feel high-grade click but certainly this worsens his pain bilaterally. This is remarkable for crepitation than actual pop or click. No evidence of instability.     Skin: There are no rashes, ulcerations or lesions. Distal neurovascular exam is intact.     Gait: Improved gait.     Reflex reflexes are preserved her     Additional Comments:      Additional Examinations:  Right Lower Extremity: Examination of the right lower extremity does not show any tenderness, deformity or injury. Range of motion is unremarkable. There is no gross instability. There are no rashes, ulcerations or lesions. Strength and tone are normal.  Left Lower Extremity: Examination of the left lower extremity does not show any tenderness, deformity or injury. Range of motion is unremarkable. There is no gross instability. There are no rashes, ulcerations or lesions. Strength and tone are normal.  Lower Back: Examination of the lower back does not show any tenderness, deformity or injury. Range of motion is unremarkable. There is no gross instability. There are no rashes, ulcerations or lesions. Strength and tone are normal.  Examination of the bilateral hip reveals intact skin. The patient demonstrates full painless range of motion with regards to flexion, abduction, internal and external rotation. There is not tenderness about the greater trochanter.   There is a negative straight leg raise against MR Right Knee w/o Contrast            HISTORY:  Evaluate weightbearing osteoarthritis. Previously documented grade 3-4 chondromalacia    of patella.  Evaluate for medial meniscus tear.       TECHNICAL FACTORS:  Long- and short-axis fat- and water-weighted images were performed.       COMPARISON:  None.       FINDINGS:  ACL, PCL, LCL, MCL, flexor mechanism, and extensor mechanism are intact.       Thickened MCL.  Sprain, scarring and capsulitis present.       No fracture, AVN, or mass demonstrated.       Patellar and quadriceps tendons are intact.  Mild patellar and quadriceps tendon thickening at    the patellar attachment.  Traction related bony hypertrophy present.       Inflammation present within Hoffa's fat.  Traction related bony hypertrophy involves the    anterior tibial tubercle.       CONCLUSION:   1. Intermediate-grade to high-grade patellofemoral compartment chondromalacia. 2. Thickened MCL.  Chronic sprain, scarring and capsulitis present. 3. No meniscal or cruciate tear. 4. No fracture, AVN or mass. 5. Distal quadriceps and proximal patellar tendon tendinopathy at their attachment to the    patella.  No tear.       Thank you for the opportunity to provide your interpretation.               Terri Seo MD       A: YOBANY/ 05/20/2021 10:00 PM   T: MG 05/20/2021 7:59 PM       We did do updated bilateral knee AP and PA weightbearing sunrise and lateral films in the office on 5/11/2021 which does show stable appearance to his underlying patellofemoral arthropathy with mild to moderate narrowing of his medial compartments. Assessment & Plan:    Encounter Diagnoses   Name Primary?     Chronic pain of left knee Yes    Chronic pain of right knee     Chondromalacia of both patellae     Chronic pain of both knees     Primary osteoarthritis of both knees        Orders Placed This Encounter   Procedures    54280 - [place] DRAIN/INJECT LARGE JOINT/BURSA         Treatment Plan:  Treatment options were discussed with Larry Salinas. Clinically at this point he has become a little bit more sore over the last several weeks following completion of his last round of bilateral knee Euflexxa on 2/1/2022. After discussing options, we did perform steroid injections to his knees using 2 cc of Celestone, 2 cc of Marcaine, 1 cc Xylocaine. He will continue with the Celebrex 200 mg daily with use of his knee braces and his home-based exercise program and maintenance of his weight loss. Icing and activity modification was discussed. He would be eligible for repeat viscosupplementation in August 2022 we will likely pursue this. He will contact us in interim with questions or concerns. This dictation was performed with a verbal recognition program (DRAGON) and it was checked for errors. It is possible that there are still dictated errors within this office note. If so, please bring any errors to my attention for an addendum. All efforts were made to ensure that this office note is accurate.

## 2022-06-14 NOTE — LETTER
[facility] [address] [place] [address]  [id number] [employer] [address]  Phone: [phone]  Fax: [phone]    Denice Syed MD        June 14, 2022     Patient: Javad Vargas   YOB: 1970   Date of Visit: 6/14/2022       To Whom It May Concern: It is my medical opinion that Rebecca Zavala may return to work on 6/15/22. Would recommend him having the remainder of today off due to receiving knee injections in the office today. If you have any questions or concerns, please don't hesitate to call.     Sincerely,      Denice Syed MD

## 2022-07-01 DIAGNOSIS — I10 BENIGN ESSENTIAL HTN: ICD-10-CM

## 2022-07-01 DIAGNOSIS — E11.9 CONTROLLED TYPE 2 DIABETES MELLITUS WITHOUT COMPLICATION, WITHOUT LONG-TERM CURRENT USE OF INSULIN (HCC): ICD-10-CM

## 2022-07-01 RX ORDER — SITAGLIPTIN AND METFORMIN HYDROCHLORIDE 1000; 50 MG/1; MG/1
TABLET, FILM COATED ORAL
Qty: 60 TABLET | Refills: 0 | Status: SHIPPED | OUTPATIENT
Start: 2022-07-01 | End: 2022-09-12

## 2022-07-01 RX ORDER — LISINOPRIL 5 MG/1
TABLET ORAL
Qty: 30 TABLET | Refills: 0 | Status: SHIPPED | OUTPATIENT
Start: 2022-07-01 | End: 2022-09-12

## 2022-07-01 NOTE — TELEPHONE ENCOUNTER
Last appointment: 5/6/2022  Next appointment: Visit date not found  Last refill: 6/1/2021  Left message for patient to call and schedule due/overdue appointment.

## 2022-07-22 ENCOUNTER — TELEPHONE (OUTPATIENT)
Dept: ORTHOPEDIC SURGERY | Age: 52
End: 2022-07-22

## 2022-07-26 ENCOUNTER — OFFICE VISIT (OUTPATIENT)
Dept: ORTHOPEDIC SURGERY | Age: 52
End: 2022-07-26
Payer: OTHER GOVERNMENT

## 2022-07-26 VITALS — WEIGHT: 220 LBS | HEIGHT: 72 IN | BODY MASS INDEX: 29.8 KG/M2

## 2022-07-26 DIAGNOSIS — M25.561 CHRONIC PAIN OF BOTH KNEES: ICD-10-CM

## 2022-07-26 DIAGNOSIS — M25.562 CHRONIC PAIN OF BOTH KNEES: ICD-10-CM

## 2022-07-26 DIAGNOSIS — M22.41 CHONDROMALACIA OF BOTH PATELLAE: ICD-10-CM

## 2022-07-26 DIAGNOSIS — M17.0 PRIMARY OSTEOARTHRITIS OF BOTH KNEES: ICD-10-CM

## 2022-07-26 DIAGNOSIS — G89.29 CHRONIC PAIN OF LEFT KNEE: Primary | ICD-10-CM

## 2022-07-26 DIAGNOSIS — G89.29 CHRONIC PAIN OF RIGHT KNEE: ICD-10-CM

## 2022-07-26 DIAGNOSIS — M25.562 CHRONIC PAIN OF LEFT KNEE: Primary | ICD-10-CM

## 2022-07-26 DIAGNOSIS — G89.29 CHRONIC PAIN OF BOTH KNEES: ICD-10-CM

## 2022-07-26 DIAGNOSIS — M25.561 CHRONIC PAIN OF RIGHT KNEE: ICD-10-CM

## 2022-07-26 DIAGNOSIS — M22.42 CHONDROMALACIA OF BOTH PATELLAE: ICD-10-CM

## 2022-07-26 PROCEDURE — 20610 DRAIN/INJ JOINT/BURSA W/O US: CPT | Performed by: FAMILY MEDICINE

## 2022-07-26 RX ORDER — HYALURONATE SODIUM 10 MG/ML
40 SYRINGE (ML) INTRAARTICULAR ONCE
Status: COMPLETED | OUTPATIENT
Start: 2022-07-26 | End: 2022-07-26

## 2022-07-26 RX ADMIN — Medication 40 MG: at 14:05

## 2022-07-26 NOTE — PROGRESS NOTES
Chief Complaint  Knee Pain (Bilateral Knees  Euflexxa 1)    followup recurrent bilateral knee pain with substantial patellofemoral arthropathy with underlying knee osteoarthritis with mild to moderate recurrent synovitis is post completion of the most recent round of viscosupplementation 2/1/2022 using Euflexxa bilateral    Rory Edmonds is a 45 y.o. male Kimmy Orellana is a 77-year-old white male type II diabetic who is a walking  for the [employer]. He is being seen back today for follow-up on his bilateral knee symptomatic chondromalacia patella with underlying knee osteoarthritis. He last completed his Visco supplementation on 7/13/2021 which did provide very good pain relief for him but over the last few weeks with the weather change he has become a little bit more sore primarily anteriorly involving his knees. .      History of Present Illness for Follow Up Patient:      Kimmy Orellana is being seen in follow up today for [facility] claim for chronic bilateral knee pain. The patient rates their current pain as a  5-6  out of 10 on the pain scale. Activities aggravating current symptoms include walking long durations and after sitting for long periods of time. Activities relieving current symptoms include rest. Conservative treatment to date includes: rest, ice, NSAID X2 100 mg daily, knee bracing, home exercises, cortisone injections and Euflexxa most recently completed on 5/21/2019. Denies locking catching or true instability symptoms. He has continue with his exercise program does utilize his knee braces periodically. He is continued to lose weight and has lost about 50 pounds. Denies locking catching or instability symptoms. With I saw Kimmy Orellana in the office on 2/1/2022 when we completed his Euflexxa bilaterally for his underlying knee osteoarthritis and chondromalacia patella.   He did reasonably well throughout the remainder of [place] in early spring but over the last several weeks has intact  Mental Status: The patient is oriented to time, place and person. The patient's mood and affect are appropriate. Lymphatic: The lymphatic examination bilaterally reveals all areas to be without enlargement or induration. Vascular: Examination reveals no swelling or calf tenderness. Peripheral pulses are palpable and 2+. Neurological: The patient has good coordination. There is no weakness or sensory deficit. Knee Examination  Inspection:  He is in slight varus and has trace  effusions bilaterally. There is marked patellofemoral crepitation. No high-grade malalignment. Palpation: He has mild recurrent tenderness to palpation over the medial joint line and also over the retropatellar joint. He does have a mildly  positive grind testing. This is certainly more prominent than last week. He is most tender along the anterior 3rd medial joint lines. He rates this at about of 5-6 out of 10. Rang of Motion:  Range of motion -5-120 bilaterally. Strength:  Strength testing 4+ out of 5 knee flexion extension. Special Tests:  He is a mildly positive grind test and pain with medial Helen's. I do not feel high-grade click but certainly this worsens his pain bilaterally. This is remarkable for crepitation than actual pop or click. No evidence of instability. Skin: There are no rashes, ulcerations or lesions. Distal neurovascular exam is intact. Gait: Improved gait. Reflex reflexes are preserved her     Additional Comments:      Additional Examinations:  Right Lower Extremity: Examination of the right lower extremity does not show any tenderness, deformity or injury. Range of motion is unremarkable. There is no gross instability. There are no rashes, ulcerations or lesions. Strength and tone are normal.  Left Lower Extremity: Examination of the left lower extremity does not show any tenderness, deformity or injury. Range of motion is unremarkable.   There is no gross Exam: evaluate weight bearing osteoarthritis. previously documented grade 3-4 chondromalacia of patella. rule out medial meniscus tear   This document is confidential medical information. Unauthorized disclosure or use of this information is prohibited by law. If you are not the intended recipient of this document, please advise us by calling immediately [phone]. [facility] [address]           Patient Name: Angelica Martínez   Case ID: [id number]   Patient : 1970   Referring Physician: Silva Brownlee MD   Exam Date: 2021   Exam Description: MR Right Knee w/o Contrast            HISTORY:  Evaluate weightbearing osteoarthritis. Previously documented grade 3-4 chondromalacia    of patella. Evaluate for medial meniscus tear. TECHNICAL FACTORS:  Long- and short-axis fat- and water-weighted images were performed. COMPARISON:  None. FINDINGS:  ACL, PCL, LCL, MCL, flexor mechanism, and extensor mechanism are intact. Thickened MCL. Sprain, scarring and capsulitis present. No fracture, AVN, or mass demonstrated. Patellar and quadriceps tendons are intact. Mild patellar and quadriceps tendon thickening at    the patellar attachment. Traction related bony hypertrophy present. Inflammation present within Hoffa's fat. Traction related bony hypertrophy involves the    anterior tibial tubercle. CONCLUSION:   1. Intermediate-grade to high-grade patellofemoral compartment chondromalacia. 2. Thickened MCL. Chronic sprain, scarring and capsulitis present. 3. No meniscal or cruciate tear. 4. No fracture, AVN or mass. 5. Distal quadriceps and proximal patellar tendon tendinopathy at their attachment to the    patella. No tear. Thank you for the opportunity to provide your interpretation.                Viv Hay MD       A: YOBANY/ 2021 10:00 PM   T:  2021 7:59 PM       We did do updated bilateral knee AP and PA weightbearing sunrise and lateral films in the office on 5/11/2021 which does show stable appearance to his underlying patellofemoral arthropathy with mild to moderate narrowing of his medial compartments. Assessment & Plan:    Encounter Diagnoses   Name Primary? Chronic pain of left knee Yes    Chronic pain of right knee     Chondromalacia of both patellae     Chronic pain of both knees     Primary osteoarthritis of both knees        Orders Placed This Encounter   Procedures    17029 - [place] DRAIN/INJECT LARGE JOINT/BURSA         Treatment Plan:  Treatment options were discussed with Frankie Castillo. Clinically at this point he has become a little bit more sore over the last several weeks following completion of his last round of bilateral knee Euflexxa on 2/1/2022. Discussion of pros and cons of viscosupplementation, he did receive his first injection of Euflexxa to his knees bilaterally. This was performed using a standard prefilled 2 cc syringe. He will continue with his Celebrex 200 mg daily use of his knee braces and his exercise program and maintenance of his weight loss. He will be seen back each in the next 2 weeks and will be seen back each in the next 2 weeks to finish up his Euflexxa series of icing and activity modification was discussed. He will contact us in the interim with questions or concerns. would be eligible for repeat viscosupplementation in August 2022 we will likely pursue this. He will contact us in interim with questions or concerns. This dictation was performed with a verbal recognition program (DRAGON) and it was checked for errors. It is possible that there are still dictated errors within this office note. If so, please bring any errors to my attention for an addendum. All efforts were made to ensure that this office note is accurate.

## 2022-08-02 ENCOUNTER — OFFICE VISIT (OUTPATIENT)
Dept: ORTHOPEDIC SURGERY | Age: 52
End: 2022-08-02
Payer: OTHER GOVERNMENT

## 2022-08-02 DIAGNOSIS — M22.41 CHONDROMALACIA OF BOTH PATELLAE: ICD-10-CM

## 2022-08-02 DIAGNOSIS — M22.42 CHONDROMALACIA OF BOTH PATELLAE: ICD-10-CM

## 2022-08-02 DIAGNOSIS — M25.561 CHRONIC PAIN OF BOTH KNEES: ICD-10-CM

## 2022-08-02 DIAGNOSIS — G89.29 CHRONIC PAIN OF LEFT KNEE: Primary | ICD-10-CM

## 2022-08-02 DIAGNOSIS — G89.29 CHRONIC PAIN OF RIGHT KNEE: ICD-10-CM

## 2022-08-02 DIAGNOSIS — M25.561 CHRONIC PAIN OF RIGHT KNEE: ICD-10-CM

## 2022-08-02 DIAGNOSIS — M25.562 CHRONIC PAIN OF LEFT KNEE: Primary | ICD-10-CM

## 2022-08-02 DIAGNOSIS — G89.29 CHRONIC PAIN OF BOTH KNEES: ICD-10-CM

## 2022-08-02 DIAGNOSIS — M25.562 CHRONIC PAIN OF BOTH KNEES: ICD-10-CM

## 2022-08-02 PROCEDURE — 99999 PR OFFICE/OUTPT VISIT,PROCEDURE ONLY: CPT | Performed by: FAMILY MEDICINE

## 2022-08-02 PROCEDURE — 20610 DRAIN/INJ JOINT/BURSA W/O US: CPT | Performed by: FAMILY MEDICINE

## 2022-08-02 RX ORDER — HYALURONATE SODIUM 10 MG/ML
40 SYRINGE (ML) INTRAARTICULAR ONCE
Status: COMPLETED | OUTPATIENT
Start: 2022-08-02 | End: 2022-08-02

## 2022-08-02 RX ADMIN — Medication 40 MG: at 13:31

## 2022-08-02 NOTE — PROGRESS NOTES
CC:  FU Knee Osteoarthritis with Viscosupplementation      Knee Pain (Bilateral Knees  Euflexxa 1)    followup recurrent bilateral knee pain with substantial patellofemoral arthropathy with underlying knee osteoarthritis with mild to moderate recurrent synovitis is post completion of the most recent round of viscosupplementation 2/1/2022 using Euflexxa bilateral    Rupesh Pappas is a 45 y.o. male Bobby Lizama is a 68-year-old white male type II diabetic who is a walking  for the [employer]. He is being seen back today for follow-up on his bilateral knee symptomatic chondromalacia patella with underlying knee osteoarthritis. He last completed his Visco supplementation on 7/13/2021 which did provide very good pain relief for him but over the last few weeks with the weather change he has become a little bit more sore primarily anteriorly involving his knees. .      History of Present Illness for Follow Up Patient:      Bobby Lizama is being seen in follow up today for [facility] claim for chronic bilateral knee pain. The patient rates their current pain as a 5-6 out of 10 on the pain scale. Activities aggravating current symptoms include walking long durations and after sitting for long periods of time. Activities relieving current symptoms include rest. Conservative treatment to date includes: rest, ice, NSAID X2 100 mg daily, knee bracing, home exercises, cortisone injections and Euflexxa most recently completed on 5/21/2019. Denies locking catching or true instability symptoms. He has continue with his exercise program does utilize his knee braces periodically. He is continued to lose weight and has lost about 50 pounds. Denies locking catching or instability symptoms. With I saw Bobby Lizama in the office on 2/1/2022 when we completed his Euflexxa bilaterally for his underlying knee osteoarthritis and chondromalacia patella.   He did reasonably well throughout the remainder of [place] in early spring but over the last several weeks has become a little bit more sore and achy with pain in the range of 4-5 out of 10. 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 He has continued with his anti-inflammatory medications and has continued his weight loss at about 50 pounds and denies locking catching or true instability symptoms. He does continue with his anti-inflammatories and denies locking catching or true instability symptoms. Not be eligible for repeat viscosupplementation until August 2022. Gladis Woods was last seen in the office on 6/14/2022 and was continued on treatment for his substantial bilateral knee patellofemoral arthropathy with weightbearing arthropathy. He continues to do reasonably well but is eligible once again for viscosupplementation and would like to pursue this as he has for a number of years. For the most part his pain usually is in the range of 3-4 out of 10. He is lost quite a bit of weight and has kept this off. Denies locking catching or true instability symptoms. He is tolerated Celebrex but does need a refill and tries to work on his exercise program.    Gladis Woods was last seen in the office on 7/26/2022 and was continued on treatment for his bilateral substantially patellofemoral neuropathy with some underlying weightbearing osteoarthritis. He is here to have his second Euflexxa injection series stating that his knee symptoms are doing very well at this point. Its only about a 2-3 out of 10. He has been working on his exercise program does utilize his brace and does take his Celebrex. Denies locking catching or true instability symptoms. He is here today for second Euflexxa injection is clinically doing well. PE: no substantial change in exam.    Assessment:  Knee Osteoarthritis with viscosupplementation      PROCEDURE NOTE:    PRE-PROCEDURE DIAGNOSIS: DJD knee    POST-PROCEDURE DIAGNOSIS: DJD knee    Injection #2 of Euflexxa.     PROCEDURE:  With the patient's permission, his bilaterally knee was prepped in standard sterile fashion with Betadine and Alcohol and the prefilled 2cc injection of Euflexxa was injected intra-articularly into the bilaterally knee via a superolateral approach without difficulty. The patient tolerated this well without difficulty. A band-aid was applied. POST-PROCEDURE INSTRUCTIONS GIVEN TO PATIENT: The patient was advised to ice the knee for 15-20 minutes to relieve any injection site related pain. FOLLOW-UP: as directed. He will continue with his Celebrex 200 mg daily his home exercise program and bracing. We will see him back next week to finish up his Euflexxa series. He will contact us in the interim with questions or concerns.

## 2022-08-09 ENCOUNTER — OFFICE VISIT (OUTPATIENT)
Dept: ORTHOPEDIC SURGERY | Age: 52
End: 2022-08-09
Payer: OTHER GOVERNMENT

## 2022-08-09 DIAGNOSIS — M17.0 PRIMARY OSTEOARTHRITIS OF BOTH KNEES: ICD-10-CM

## 2022-08-09 DIAGNOSIS — M22.41 CHONDROMALACIA OF BOTH PATELLAE: ICD-10-CM

## 2022-08-09 DIAGNOSIS — M22.42 CHONDROMALACIA OF BOTH PATELLAE: ICD-10-CM

## 2022-08-09 DIAGNOSIS — M25.561 CHRONIC PAIN OF RIGHT KNEE: ICD-10-CM

## 2022-08-09 DIAGNOSIS — G89.29 CHRONIC PAIN OF RIGHT KNEE: ICD-10-CM

## 2022-08-09 DIAGNOSIS — G89.29 CHRONIC PAIN OF LEFT KNEE: Primary | ICD-10-CM

## 2022-08-09 DIAGNOSIS — M25.562 CHRONIC PAIN OF LEFT KNEE: Primary | ICD-10-CM

## 2022-08-09 PROCEDURE — 20610 DRAIN/INJ JOINT/BURSA W/O US: CPT | Performed by: FAMILY MEDICINE

## 2022-08-09 RX ORDER — HYALURONATE SODIUM 10 MG/ML
40 SYRINGE (ML) INTRAARTICULAR ONCE
Status: COMPLETED | OUTPATIENT
Start: 2022-08-09 | End: 2022-08-09

## 2022-08-09 RX ADMIN — Medication 40 MG: at 13:01

## 2022-08-09 NOTE — PROGRESS NOTES
CC:  FU Knee Osteoarthritis with Viscosupplementation      Knee Pain (Bilateral Knees  Euflexxa 3)    followup recurrent bilateral knee pain with substantial patellofemoral arthropathy with underlying knee osteoarthritis with mild to moderate recurrent synovitis is post completion of the most recent round of viscosupplementation 2/1/2022 using Euflexxa bilateral    Aron Elizabeth is a 45 y.o. male Ale Mcgill is a 57-year-old white male type II diabetic who is a walking  for the [employer]. He is being seen back today for follow-up on his bilateral knee symptomatic chondromalacia patella with underlying knee osteoarthritis. He last completed his Visco supplementation on 7/13/2021 which did provide very good pain relief for him but over the last few weeks with the weather change he has become a little bit more sore primarily anteriorly involving his knees. .      History of Present Illness for Follow Up Patient:      Ale Mcgill is being seen in follow up today for [facility] claim for chronic bilateral knee pain. The patient rates their current pain as a 5-6 out of 10 on the pain scale. Activities aggravating current symptoms include walking long durations and after sitting for long periods of time. Activities relieving current symptoms include rest. Conservative treatment to date includes: rest, ice, NSAID X2 100 mg daily, knee bracing, home exercises, cortisone injections and Euflexxa most recently completed on 5/21/2019. Denies locking catching or true instability symptoms. He has continue with his exercise program does utilize his knee braces periodically. He is continued to lose weight and has lost about 50 pounds. Denies locking catching or instability symptoms. With I saw Ale Mcgill in the office on 2/1/2022 when we completed his Euflexxa bilaterally for his underlying knee osteoarthritis and chondromalacia patella.   He did reasonably well throughout the remainder of [place] in early spring but over the last several weeks has become a little bit more sore and achy with pain in the range of 4-5 out of 10. 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 He has continued with his anti-inflammatory medications and has continued his weight loss at about 50 pounds and denies locking catching or true instability symptoms. He does continue with his anti-inflammatories and denies locking catching or true instability symptoms. Not be eligible for repeat viscosupplementation until August 2022. Gladis Woods was last seen in the office on 6/14/2022 and was continued on treatment for his substantial bilateral knee patellofemoral arthropathy with weightbearing arthropathy. He continues to do reasonably well but is eligible once again for viscosupplementation and would like to pursue this as he has for a number of years. For the most part his pain usually is in the range of 3-4 out of 10. He is lost quite a bit of weight and has kept this off. Denies locking catching or true instability symptoms. He is tolerated Celebrex but does need a refill and tries to work on his exercise program.    Gladis Woods was last seen in the office on 7/26/2022 and was continued on treatment for his bilateral substantially patellofemoral neuropathy with some underlying weightbearing osteoarthritis. He is here to have his second Euflexxa injection series stating that his knee symptoms are doing very well at this point. Its only about a 2-3 out of 10. He has been working on his exercise program does utilize his brace and does take his Celebrex. Denies locking catching or true instability symptoms. He is here today for second Euflexxa injection is clinically doing well. We last saw Gladis Woods in the office on 8/2/2022 and was continued on his Euflexxa injection series for his knees bilaterally for his underlying knee osteoarthritis and patellofemoral arthropathy.   He is here today for his final Euflexxa injection stating that his knees are doing very well at this point. Continue with his anti-inflammatory medications and has been reasonably good with performing his exercise program.  He is overall doing reasonably well at this time and denies substantial rest or night pain. PE: no substantial change in exam.    Assessment:  Knee Osteoarthritis with viscosupplementation      PROCEDURE NOTE:    PRE-PROCEDURE DIAGNOSIS: DJD knee    POST-PROCEDURE DIAGNOSIS: DJD knee    Injection #3 of Euflexxa. PROCEDURE:  With the patient's permission, his bilaterally knee was prepped in standard sterile fashion with Betadine and Alcohol and the prefilled 2cc injection of Euflexxa was injected intra-articularly into the bilaterally knee via a superolateral approach without difficulty. The patient tolerated this well without difficulty. A band-aid was applied. POST-PROCEDURE INSTRUCTIONS GIVEN TO PATIENT: The patient was advised to ice the knee for 15-20 minutes to relieve any injection site related pain. FOLLOW-UP: as directed. He will continue with his Celebrex 200 mg daily his home exercise program and bracing. He is aware that he can have repeat viscosupplementation at 6-month intervals or interim steroid injections if needed if approved by [employer]. He will contact us in the interim with questions or concerns.

## 2022-09-12 DIAGNOSIS — I10 BENIGN ESSENTIAL HTN: ICD-10-CM

## 2022-09-12 DIAGNOSIS — E11.9 CONTROLLED TYPE 2 DIABETES MELLITUS WITHOUT COMPLICATION, WITHOUT LONG-TERM CURRENT USE OF INSULIN (HCC): ICD-10-CM

## 2022-09-12 RX ORDER — SITAGLIPTIN AND METFORMIN HYDROCHLORIDE 1000; 50 MG/1; MG/1
TABLET, FILM COATED ORAL
Qty: 60 TABLET | Refills: 0 | Status: SHIPPED | OUTPATIENT
Start: 2022-09-12 | End: 2022-10-12

## 2022-09-12 RX ORDER — LISINOPRIL 5 MG/1
TABLET ORAL
Qty: 30 TABLET | Refills: 0 | Status: SHIPPED | OUTPATIENT
Start: 2022-09-12 | End: 2022-10-11

## 2022-09-21 ENCOUNTER — OFFICE VISIT (OUTPATIENT)
Dept: INTERNAL MEDICINE CLINIC | Age: 52
End: 2022-09-21
Payer: COMMERCIAL

## 2022-09-21 VITALS
DIASTOLIC BLOOD PRESSURE: 86 MMHG | SYSTOLIC BLOOD PRESSURE: 118 MMHG | OXYGEN SATURATION: 97 % | WEIGHT: 228.6 LBS | RESPIRATION RATE: 14 BRPM | HEART RATE: 70 BPM | BODY MASS INDEX: 30.3 KG/M2 | HEIGHT: 73 IN

## 2022-09-21 DIAGNOSIS — I10 BENIGN ESSENTIAL HTN: ICD-10-CM

## 2022-09-21 DIAGNOSIS — Z86.010 HISTORY OF COLON POLYPS: ICD-10-CM

## 2022-09-21 DIAGNOSIS — E11.40 TYPE 2 DIABETES MELLITUS WITH DIABETIC NEUROPATHY, WITHOUT LONG-TERM CURRENT USE OF INSULIN (HCC): Primary | ICD-10-CM

## 2022-09-21 DIAGNOSIS — Z12.5 SCREENING FOR PROSTATE CANCER: ICD-10-CM

## 2022-09-21 DIAGNOSIS — Z12.11 SCREEN FOR COLON CANCER: ICD-10-CM

## 2022-09-21 DIAGNOSIS — K75.81 NASH (NONALCOHOLIC STEATOHEPATITIS): ICD-10-CM

## 2022-09-21 DIAGNOSIS — E78.2 HYPERLIPIDEMIA, MIXED: ICD-10-CM

## 2022-09-21 DIAGNOSIS — Z23 NEED FOR PNEUMOCOCCAL VACCINATION: ICD-10-CM

## 2022-09-21 PROCEDURE — 90472 IMMUNIZATION ADMIN EACH ADD: CPT | Performed by: INTERNAL MEDICINE

## 2022-09-21 PROCEDURE — 90677 PCV20 VACCINE IM: CPT | Performed by: INTERNAL MEDICINE

## 2022-09-21 PROCEDURE — 99214 OFFICE O/P EST MOD 30 MIN: CPT | Performed by: INTERNAL MEDICINE

## 2022-09-21 PROCEDURE — 90471 IMMUNIZATION ADMIN: CPT | Performed by: INTERNAL MEDICINE

## 2022-09-21 PROCEDURE — 90674 CCIIV4 VAC NO PRSV 0.5 ML IM: CPT | Performed by: INTERNAL MEDICINE

## 2022-09-21 NOTE — PROGRESS NOTES
[facility]) Physicians  Internal Medicine  Patient Encounter  Selma Chin D.O., [facility]         Chief Complaint   Patient presents with    Check-Up    Medication Check         HPI: 45 y.o. male who has been noncompliant with follow-up here in the office presents today requesting a checkup and medication check. He has also been noncompliant with obtaining his health maintenance tests. Patient states he has been stressed caring for his mother who is in hospice. He has very little time to get anything done for himself. Health maintenance items overdue:  Prevnar 20 pneumococcal vaccination  Shingrix  Hepatitis B vaccine  COVID-19 vaccine booster #1 and #2  Diabetic foot exam  Diabetic microalbumin test  Lab  Diabetic eye exam  Colon cancer screening        Diabetes Mellitus Type II, Follow-up--   Lab Results   Component Value Date    LABA1C 5.7 03/10/2021     Lab Results   Component Value Date    .9 03/10/2021      Previous history---->  his A1c in October 2017 was 9.0%. His highest A1c was in 2014 at 11.3%. Unfortunately, the patient has not had an A1c in a year and a half. He denies any symptoms of glucose toxicity such as polyuria or polydipsia. He has had no significant weight changes. No hypoglycemia  Sugars-- 110-174. U.Microalbumin/Cr--9/29/2020, overdue  Last Eye exam--4/8/2021, overdue  + ACEi-- Lisinopril  Complications-- None. Insulin Treated? No.  + ASA  + Statin  Last LDL-76  Foot exam-9/29/2020, overdue    Hyperlipidemia:    Lab Results   Component Value Date    LDLCALC 49 03/10/2021   Patient does report that he is been taking his pravastatin. He denies any new myalgias. HTN--patient denies any headaches, dizziness, episodes of unilateral weakness, paresthesias, speech and visual disturbances. KOEHLER-- Liver enzymes had gotten better with weight loss. Overdue for lab. Gout-- He had a couple of flares in 2020. He is on Allopurinol.    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 tophus 10/10/2016    Depression     Hyperlipidemia     Hypertension     IBS (irritable bowel syndrome)     KOEHLER (nonalcoholic steatohepatitis) 10/17/2019    Osteoarthritis     Type 2 diabetes mellitus without complication, without long-term current use of insulin ([facility] [address] 10/10/2016    Vitamin D deficiency          MEDICATIONS:  Prior to Visit Medications    Medication Sig   lisinopril (PRINIVIL;ZESTRIL) 5 MG tablet TAKE 1 TABLET BY MOUTH EVERY DAY   JANUMET  MG per tablet TAKE 1 TABLET BY MOUTH TWICE A DAY WITH FOOD   allopurinol (ZYLOPRIM) 100 MG tablet TAKE 1 TABLET BY MOUTH TWICE A DAY   celecoxib (CELEBREX) 200 MG capsule TAKE 1 CAPSULE BY MOUTH EVERY DAY   JARDIANCE 10 MG tablet TAKE 1 TABLET BY MOUTH EVERY DAY   pravastatin (PRAVACHOL) 80 MG tablet TAKE 1 TABLET BY MOUTH EVERY DAY   indomethacin (INDOCIN) 50 MG capsule Take 1 capsule by mouth 3 times daily as needed (gout attack) Take as needed for gout   ONE TOUCH ULTRA TEST strip TEST BLOOD SUGAR TWICE A DAY   aspirin EC 81 MG EC tablet Take 1 tablet by mouth daily. blood glucose test strips (GLUCOMETER ELITE TEST STRIPS) strip Use one tid to check blood sugars. Patient uses ONE TOUCH STRIP             Review of Systems -as per HPI        OBJECTIVE:  Vitals:    09/21/22 1628   BP: 118/86   Pulse: 70   Resp: 14   SpO2: 97%   Weight: 228 lb 9.6 oz (103.7 kg)   Height: 6' 1\" (1.854 m)     Body mass index is 30.16 kg/m². Wt Readings from Last 3 Encounters:   09/21/22 228 lb 9.6 oz (103.7 kg)   07/26/22 220 lb (99.8 kg)   01/25/22 225 lb (102.1 kg)     BP Readings from Last 3 Encounters:   09/21/22 118/86   03/10/21 120/72   09/29/20 120/74          GEN: NAD, A&O, Non-toxic, Obese  HEENT: NC/AT, CANDY, Anicteric. Oral cavity Clear without lesions,  TM's NL. Throat is NL. NECK: Supple.   No thyromegaly or with Lifestyle modification including low calorie diet focusing on Low fat/low cholesterol and low carbohydrate intake, along with  increasing cardiovascular (aerobic) exercise. - Comprehensive Metabolic Panel; Future    7. Hyperlipidemia, mixed  Condition control is uncertain  Due for lab  Continue statin therapy as part of his overall cardiovascular disease risk reduction strategy. - Comprehensive Metabolic Panel; Future  - Lipid Panel; Future    8. Screening for prostate cancer    - PSA Screening;  Future

## 2022-09-22 LAB
CREATININE URINE: 301.1 MG/DL (ref 39–259)
MICROALBUMIN UR-MCNC: <1.2 MG/DL
MICROALBUMIN/CREAT UR-RTO: ABNORMAL MG/G (ref 0–30)

## 2022-09-30 DIAGNOSIS — E78.2 HYPERLIPIDEMIA, MIXED: Primary | ICD-10-CM

## 2022-09-30 DIAGNOSIS — E11.40 TYPE 2 DIABETES MELLITUS WITH DIABETIC NEUROPATHY, WITHOUT LONG-TERM CURRENT USE OF INSULIN (HCC): ICD-10-CM

## 2022-09-30 RX ORDER — ROSUVASTATIN CALCIUM 20 MG/1
20 TABLET, COATED ORAL NIGHTLY
Qty: 90 TABLET | Refills: 1 | Status: SHIPPED | OUTPATIENT
Start: 2022-09-30

## 2022-10-11 DIAGNOSIS — I10 BENIGN ESSENTIAL HTN: ICD-10-CM

## 2022-10-11 RX ORDER — LISINOPRIL 5 MG/1
TABLET ORAL
Qty: 90 TABLET | Refills: 1 | Status: SHIPPED | OUTPATIENT
Start: 2022-10-11

## 2022-10-12 DIAGNOSIS — E11.9 CONTROLLED TYPE 2 DIABETES MELLITUS WITHOUT COMPLICATION, WITHOUT LONG-TERM CURRENT USE OF INSULIN (HCC): ICD-10-CM

## 2022-10-12 RX ORDER — SITAGLIPTIN AND METFORMIN HYDROCHLORIDE 1000; 50 MG/1; MG/1
TABLET, FILM COATED ORAL
Qty: 180 TABLET | Refills: 1 | Status: SHIPPED | OUTPATIENT
Start: 2022-10-12

## 2022-11-09 RX ORDER — ALLOPURINOL 100 MG/1
TABLET ORAL
Qty: 180 TABLET | Refills: 1 | Status: SHIPPED | OUTPATIENT
Start: 2022-11-09

## 2022-11-09 NOTE — TELEPHONE ENCOUNTER
Last appointment: 9/21/2022  Next appointment: Visit date not found  Last refill: 5/6/22   Sent [employer] message to schedule next appointment due in March.

## 2022-12-15 RX ORDER — EMPAGLIFLOZIN 10 MG/1
TABLET, FILM COATED ORAL
Qty: 90 TABLET | Refills: 3 | Status: SHIPPED | OUTPATIENT
Start: 2022-12-15

## 2023-01-24 ENCOUNTER — OFFICE VISIT (OUTPATIENT)
Dept: ORTHOPEDIC SURGERY | Age: 53
End: 2023-01-24

## 2023-01-24 VITALS — WEIGHT: 225 LBS | BODY MASS INDEX: 29.82 KG/M2 | HEIGHT: 73 IN

## 2023-01-24 DIAGNOSIS — M25.562 CHRONIC PAIN OF LEFT KNEE: Primary | ICD-10-CM

## 2023-01-24 DIAGNOSIS — M17.0 PRIMARY OSTEOARTHRITIS OF BOTH KNEES: ICD-10-CM

## 2023-01-24 DIAGNOSIS — M22.41 CHONDROMALACIA OF BOTH PATELLAE: ICD-10-CM

## 2023-01-24 DIAGNOSIS — G89.29 CHRONIC PAIN OF RIGHT KNEE: ICD-10-CM

## 2023-01-24 DIAGNOSIS — G89.29 CHRONIC PAIN OF LEFT KNEE: Primary | ICD-10-CM

## 2023-01-24 DIAGNOSIS — M22.42 CHONDROMALACIA OF BOTH PATELLAE: ICD-10-CM

## 2023-01-24 DIAGNOSIS — M25.561 CHRONIC PAIN OF RIGHT KNEE: ICD-10-CM

## 2023-01-24 RX ORDER — METHYLPREDNISOLONE 4 MG/1
TABLET ORAL
Qty: 1 KIT | Refills: 0 | Status: SHIPPED | OUTPATIENT
Start: 2023-01-24 | End: 2023-01-30

## 2023-01-24 NOTE — PROGRESS NOTES
Chief Complaint  Knee Pain (Bilateral knee pain)    followup recurrent bilateral knee pain with substantial patellofemoral arthropathy with underlying knee osteoarthritis with mild to moderate recurrent synovitis is post completion of the most recent round of viscosupplementation 8/9/2022 using Euflexxa bilateral    Ld Rincon is a 45 y.o. male Telma Nicole is a 57-year-old white male reasonably well-controlled type II diabetic who is a walking  for the [employer]. He is being seen back today for follow-up on his bilateral knee symptomatic chondromalacia patella with underlying knee osteoarthritis. He last completed his Visco supplementation on 8/9/2022 which did provide very good pain relief for him but over the last few weeks with the weather change he has become a little bit more sore primarily anteriorly involving his knees. .      History of Present Illness for Follow Up Patient:      Telma Nicole is being seen in follow up today for [facility] claim for chronic bilateral knee pain. The patient rates their current pain as a  5-6  out of 10 on the pain scale. Activities aggravating current symptoms include walking long durations and after sitting for long periods of time. Activities relieving current symptoms include rest. Conservative treatment to date includes: rest, ice, NSAID X2 100 mg daily, knee bracing, home exercises, cortisone injections and Euflexxa most recently completed on 5/21/2019. Denies locking catching or true instability symptoms. He has continue with his exercise program does utilize his knee braces periodically. He is continued to lose weight and has lost about 50 pounds. Denies locking catching or instability symptoms. With I saw Telma Nicole in the office on 2/1/2022 when we completed his Euflexxa bilaterally for his underlying knee osteoarthritis and chondromalacia patella.   He did reasonably well throughout the remainder of [place] in early spring but over the last several weeks has become a little bit more sore and achy with pain in the range of 4-5 out of 10. 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 He has continued with his anti-inflammatory medications and has continued his weight loss at about 50 pounds and denies locking catching or true instability symptoms. He does continue with his anti-inflammatories and denies locking catching or true instability symptoms. Not be eligible for repeat viscosupplementation until August 2022. Last saw Ale Mcgill in the office on 8/9/2022 and finished up Euflexxa for his underlying knee osteoarthritis with chondromalacia patella. He has been doing reasonly well but has been doing fairly long hours at work between 1 to 12 hours/day. There is no interim history of injury but over the last several weeks has had worsening pain ranging between a 3-7 out of 10. Distance walking stairclimbing continue to be problematic and is continue with his Celebrex 200 mg daily and has maintained his weight loss at about 50 pounds. He does have some occasional pain at night. No locking catching or true instability symptoms. His pain is primarily anterior in nature. He would not be eligible for repeat viscosupplementation for a few more weeks. He does have an occasional tough time getting to sleep. No high-grade swelling. He has been compliant with his exercise program and does utilize his brace episodically. His last hemoglobin A1c was 7.2 on 9/28/2022    Attest: I have reviewed and attest the documentation of the HPI documented by my . I will make any changes if necessary. Enc Date: 1/24/2023  Time: 9:01 AM  Provider: Rosario Barragan MD        Social History     Tobacco Use    Smoking status: Never    Smokeless tobacco: Never   Vaping Use    Vaping Use: Never used   Substance Use Topics    Alcohol use:  Yes     Alcohol/week: 0.0 standard drinks     Comment: occasionally    Drug use: No        Review of Systems  Pertinent items are noted in HPI  Review of systems reviewed from Patient History Form dated on 12/17/2019 and available in the patient's chart under the Media tab. Vital Signs     Ht 6' 1\" (1.854 m)   Wt 225 lb (102.1 kg)   BMI 29.69 kg/m²       General Exam:   Constitutional: Patient is adequately groomed with no evidence of malnutrition  DTRs: Deep tendon reflexes are intact  Mental Status: The patient is oriented to time, place and person. The patient's mood and affect are appropriate. Lymphatic: The lymphatic examination bilaterally reveals all areas to be without enlargement or induration. Vascular: Examination reveals no swelling or calf tenderness. Peripheral pulses are palpable and 2+. Neurological: The patient has good coordination. There is no weakness or sensory deficit. Knee Examination  Inspection:  He is in slight varus and has trace  effusions bilaterally. There is marked patellofemoral crepitation. No high-grade malalignment. Palpation: He has mild to moderate recurrent tenderness to palpation over the medial joint line and also over the retropatellar joint. He does have a mildly to moderately positive grind testing. This is certainly more prominent than last visit. He is most tender along the anterior 3rd medial joint lines. He rates this at about of 5-6 out of 10. Rang of Motion:  Range of motion -5-120 bilaterally. Strength:  Strength testing 4+ out of 5 knee flexion extension. Special Tests:  He is a mildly to moderately positive grind test and pain with medial Helen's. I do not feel high-grade click but certainly this worsens his pain bilaterally. This is remarkable for crepitation than actual pop or click. No evidence of instability. Skin: There are no rashes, ulcerations or lesions. Distal neurovascular exam is intact. Gait: Improved gait.      Reflex reflexes are preserved her     Additional Comments:      Additional Examinations:  Right Lower Extremity: Examination of the right lower extremity does not show any tenderness, deformity or injury. Range of motion is unremarkable. There is no gross instability. There are no rashes, ulcerations or lesions. Strength and tone are normal.  Left Lower Extremity: Examination of the left lower extremity does not show any tenderness, deformity or injury. Range of motion is unremarkable. There is no gross instability. There are no rashes, ulcerations or lesions. Strength and tone are normal.  Lower Back: Examination of the lower back does not show any tenderness, deformity or injury. Range of motion is unremarkable. There is no gross instability. There are no rashes, ulcerations or lesions. Strength and tone are normal.  Examination of the bilateral hip reveals intact skin. The patient demonstrates full painless range of motion with regards to flexion, abduction, internal and external rotation. There is not tenderness about the greater trochanter. There is a negative straight leg raise against resistance. Strength is 5/5 thorough out all planes. Diagnostic Test Findings: Bilateral knee MRIs performed 5/5/2017 as listed above     Right knee MRI 5/5/2017  CONCLUSION:    1. Grade 3-4 chondromalacia of lateral facet of the patella with foci of osteochondral erosion    and chondral fissuring. 2. No meniscal tear or acute ligamentous injury. 3. Mild chronic lateral capsulitis and popliteal tendinopathy. 4. Enthesopathy of superior patellar tendon, no tear. Left knee MRI 5/5/2017  CONCLUSION:    1. Stable grade 3-4 chondromalacia of lateral facet of the patella with focal osteochondral    erosion, chondral thinning and fissuring. Grade 3 chondromalacia of superolateral trochlea with    chondral thinning and fissuring. 2. Stable chronic mild-moderate tendinopathy of superior patellar tendon with a 2cm slit-like    interstitial tear.     3. Progressed lateral capsulitis with chronic moderate tendinopathy of popliteal tendon. Does    the patient have gout? 4. 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 5. Stable grade 2 chondromalacia of weightbearing medial femorotibial compartment. MRI performed at [facility] 2021 is listed above  Narrative   Site: [facility] Rad #: [id number] #: [id number] Procedure: MR Right Knee w/o Contrast ; Reason for Exam: evaluate weight bearing osteoarthritis. previously documented grade 3-4 chondromalacia of patella. rule out medial meniscus tear   This document is confidential medical information. Unauthorized disclosure or use of this information is prohibited by law. If you are not the intended recipient of this document, please advise us by calling immediately [phone]. [facility] - [address] [place] [address]           Patient Name: Jazzy Casey   Case ID: [id number]   Patient : 1970   Referring Physician: Soniya Dejesus MD   Exam Date: 2021   Exam Description: MR Right Knee w/o Contrast            HISTORY:  Evaluate weightbearing osteoarthritis. Previously documented grade 3-4 chondromalacia    of patella. Evaluate for medial meniscus tear. TECHNICAL FACTORS:  Long- and short-axis fat- and water-weighted images were performed. COMPARISON:  None. FINDINGS:  ACL, PCL, LCL, MCL, flexor mechanism, and extensor mechanism are intact. Thickened MCL. Sprain, scarring and capsulitis present. No fracture, AVN, or mass demonstrated. Patellar and quadriceps tendons are intact. Mild patellar and quadriceps tendon thickening at    the patellar attachment. Traction related bony hypertrophy present. Inflammation present within Hoffa's fat. Traction related bony hypertrophy involves the    anterior tibial tubercle. CONCLUSION:   1. Intermediate-grade to high-grade patellofemoral compartment chondromalacia. 2. Thickened MCL. Chronic sprain, scarring and capsulitis present. 3. No meniscal or cruciate tear. 4. No fracture, AVN or mass. 5. Distal quadriceps and proximal patellar tendon tendinopathy at their attachment to the    patella. No tear. Thank you for the opportunity to provide your interpretation. Rachel Hayden MD       A: GM/mg 05/20/2021 10:00 PM   T: MG 05/20/2021 7:59 PM       We did do updated bilateral knee AP and PA weightbearing sunrise and lateral films in the office on 5/11/2021 which does show stable appearance to his underlying patellofemoral arthropathy with mild to moderate narrowing of his medial compartments. Assessment & Plan:    Encounter Diagnoses   Name Primary? Chronic pain of left knee Yes    Chronic pain of right knee     Chondromalacia of both patellae     Primary osteoarthritis of both knees          Orders Placed This Encounter   Procedures    EUFLEXXA INJECTION (For Auth/Precert)     Standing Status:   Future     Standing Expiration Date:   1/24/2024           Treatment Plan:  Treatment options were discussed with Víctor Peterson. Clinically at this point he has become a little bit more sore over the last several weeks following completion of his last round of bilateral knee Euflexxa on 9/2022. Working fairly long hours between 11 to 12 hours/day and the stable combination of driving and distance walking. With his recent worsening of pain range between a 3-7 out of 10, we did call in a Medrol Dosepak to be followed by resuming his Celebrex 200 mg daily. He will continue with his exercise program bracing and maintain his weight loss and additional weight loss was encouraged as well. Denies locking catching or true instability symptoms. We will fill out a C9 requesting a potential steroid injection and viscosupplementation once again which she is eligible for on 2/10/2023. Icing and activity modification was discussed.   He will contact us in the interim with questions or concerns. This dictation was performed with a verbal recognition program (DRAGON) and it was checked for errors. It is possible that there are still dictated errors within this office note. If so, please bring any errors to my attention for an addendum. All efforts were made to ensure that this office note is accurate.

## 2023-02-27 ENCOUNTER — TELEPHONE (OUTPATIENT)
Dept: ORTHOPEDIC SURGERY | Age: 53
End: 2023-02-27

## 2023-02-27 NOTE — TELEPHONE ENCOUNTER
SPOKE TO PATIENT AND LET HIM KNOW THAT EUFLEXXA AND CORTISONE HAVE BEEN APPROVED VIA [employer]ITE. SCHEDULED FOR CORTISONE AND WILL GET EUFLEXXAS SCHEDULED WHEN HE IS IN THE OFFICE TOMORROW.

## 2023-02-28 ENCOUNTER — OFFICE VISIT (OUTPATIENT)
Dept: ORTHOPEDIC SURGERY | Age: 53
End: 2023-02-28

## 2023-02-28 VITALS — BODY MASS INDEX: 29.82 KG/M2 | WEIGHT: 225 LBS | HEIGHT: 73 IN

## 2023-02-28 DIAGNOSIS — G89.29 CHRONIC PAIN OF LEFT KNEE: Primary | ICD-10-CM

## 2023-02-28 DIAGNOSIS — M25.562 CHRONIC PAIN OF BOTH KNEES: ICD-10-CM

## 2023-02-28 DIAGNOSIS — M25.561 CHRONIC PAIN OF RIGHT KNEE: ICD-10-CM

## 2023-02-28 DIAGNOSIS — G89.29 CHRONIC PAIN OF BOTH KNEES: ICD-10-CM

## 2023-02-28 DIAGNOSIS — M25.562 CHRONIC PAIN OF LEFT KNEE: Primary | ICD-10-CM

## 2023-02-28 DIAGNOSIS — M22.41 CHONDROMALACIA OF BOTH PATELLAE: ICD-10-CM

## 2023-02-28 DIAGNOSIS — M25.561 CHRONIC PAIN OF BOTH KNEES: ICD-10-CM

## 2023-02-28 DIAGNOSIS — M22.42 CHONDROMALACIA OF BOTH PATELLAE: ICD-10-CM

## 2023-02-28 DIAGNOSIS — G89.29 CHRONIC PAIN OF RIGHT KNEE: ICD-10-CM

## 2023-02-28 DIAGNOSIS — M17.0 PRIMARY OSTEOARTHRITIS OF BOTH KNEES: ICD-10-CM

## 2023-02-28 RX ORDER — BETAMETHASONE SODIUM PHOSPHATE AND BETAMETHASONE ACETATE 3; 3 MG/ML; MG/ML
24 INJECTION, SUSPENSION INTRA-ARTICULAR; INTRALESIONAL; INTRAMUSCULAR; SOFT TISSUE ONCE
Status: COMPLETED | OUTPATIENT
Start: 2023-02-28 | End: 2023-02-28

## 2023-02-28 RX ORDER — BUPIVACAINE HYDROCHLORIDE 2.5 MG/ML
4 INJECTION, SOLUTION INFILTRATION; PERINEURAL ONCE
Status: COMPLETED | OUTPATIENT
Start: 2023-02-28 | End: 2023-02-28

## 2023-02-28 RX ORDER — LIDOCAINE HYDROCHLORIDE 10 MG/ML
2 INJECTION, SOLUTION INFILTRATION; PERINEURAL ONCE
Status: COMPLETED | OUTPATIENT
Start: 2023-02-28 | End: 2023-02-28

## 2023-02-28 RX ADMIN — BUPIVACAINE HYDROCHLORIDE 10 MG: 2.5 INJECTION, SOLUTION INFILTRATION; PERINEURAL at 13:49

## 2023-02-28 RX ADMIN — BETAMETHASONE SODIUM PHOSPHATE AND BETAMETHASONE ACETATE 24 MG: 3; 3 INJECTION, SUSPENSION INTRA-ARTICULAR; INTRALESIONAL; INTRAMUSCULAR; SOFT TISSUE at 13:48

## 2023-02-28 RX ADMIN — LIDOCAINE HYDROCHLORIDE 2 ML: 10 INJECTION, SOLUTION INFILTRATION; PERINEURAL at 13:49

## 2023-02-28 NOTE — PROGRESS NOTES
Chief Complaint  Knee Pain (CK BILATERAL KNEES/ REQ CORTISONE )    followup recurrent bilateral knee pain with substantial patellofemoral arthropathy with underlying knee osteoarthritis with mild to moderate recurrent synovitis is post completion of the most recent round of viscosupplementation 8/9/2022 using Euflexxa bilateral    Francis Brown is a 45 y.o. male Abdiaziz Murrell is a 73-year-old white male reasonably well-controlled type II diabetic who is a walking  for the [employer]. He is being seen back today for follow-up on his bilateral knee symptomatic chondromalacia patella with underlying knee osteoarthritis. He last completed his Visco supplementation on 8/9/2022 which did provide very good pain relief for him but over the last few weeks with the weather change he has become a little bit more sore primarily anteriorly involving his knees. .      History of Present Illness for Follow Up Patient:      Abdiaziz Murrell is being seen in follow up today for [facility] claim for chronic bilateral knee pain. The patient rates their current pain as a  5-6  out of 10 on the pain scale. Activities aggravating current symptoms include walking long durations and after sitting for long periods of time. Activities relieving current symptoms include rest. Conservative treatment to date includes: rest, ice, NSAID X2 100 mg daily, knee bracing, home exercises, cortisone injections and Euflexxa most recently completed on 5/21/2019. Denies locking catching or true instability symptoms. He has continue with his exercise program does utilize his knee braces periodically. He is continued to lose weight and has lost about 50 pounds. Denies locking catching or instability symptoms. With I saw Abdiaziz Murrell in the office on 2/1/2022 when we completed his Euflexxa bilaterally for his underlying knee osteoarthritis and chondromalacia patella.   He did reasonably well throughout the remainder of [place] in early spring but over the last several weeks has become a little bit more sore and achy with pain in the range of 4-5 out of 10. 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 He has continued with his anti-inflammatory medications and has continued his weight loss at about 50 pounds and denies locking catching or true instability symptoms. He does continue with his anti-inflammatories and denies locking catching or true instability symptoms. Not be eligible for repeat viscosupplementation until August 2022. Last saw Yvonne Mendoza in the office on 8/9/2022 and finished up Euflexxa for his underlying knee osteoarthritis with chondromalacia patella. He has been doing reasonly well but has been doing fairly long hours at work between 1 to 12 hours/day. There is no interim history of injury but over the last several weeks has had worsening pain ranging between a 3-7 out of 10. Distance walking stairclimbing continue to be problematic and is continue with his Celebrex 200 mg daily and has maintained his weight loss at about 50 pounds. He does have some occasional pain at night. No locking catching or true instability symptoms. His pain is primarily anterior in nature. He would not be eligible for repeat viscosupplementation for a few more weeks. He does have an occasional tough time getting to sleep. No high-grade swelling. He has been compliant with his exercise program and does utilize his brace episodically. His last hemoglobin A1c was 7.2 on 9/28/2022    Yvonne Mendoza was last seen in the office on 1/24/2023 for his bilateral knee osteoarthritis and patellofemoral arthropathy. He has had progression of his knee pain symptoms which not range between a 4-8 out of 10. He is currently off work for his ankle per his podiatrist.  He is try to work on a stretching exercise program is continue with his braces and Celebrex. He is now having some discomfort at 9 and is eligible for repeat viscosupplementation.   He is doing well with regard to his sugars and denies active locking catching or true instability symptoms. He does believe his last round of viscosupplementation which was completed on 8/9/2022 is effectively worn out. Attest: I have reviewed and attest the documentation of the HPI documented by my . I will make any changes if necessary. Enc Date: 2/28/2023  Time: 4:47 PM  Provider: Vadim Monterroso MD        Social History     Tobacco Use    Smoking status: Never    Smokeless tobacco: Never   Vaping Use    Vaping Use: Never used   Substance Use Topics    Alcohol use: Yes     Alcohol/week: 0.0 standard drinks     Comment: occasionally    Drug use: No        Review of Systems  Pertinent items are noted in HPI  Review of systems reviewed from Patient History Form dated on 12/17/2019 and available in the patient's chart under the Media tab. Vital Signs     Ht 6' 1\" (1.854 m)   Wt 225 lb (102.1 kg)   BMI 29.69 kg/m²       General Exam:   Constitutional: Patient is adequately groomed with no evidence of malnutrition  DTRs: Deep tendon reflexes are intact  Mental Status: The patient is oriented to time, place and person. The patient's mood and affect are appropriate. Lymphatic: The lymphatic examination bilaterally reveals all areas to be without enlargement or induration. Vascular: Examination reveals no swelling or calf tenderness. Peripheral pulses are palpable and 2+. Neurological: The patient has good coordination. There is no weakness or sensory deficit. Knee Examination  Inspection:  He is in slight varus and has trace  effusions bilaterally. There is marked patellofemoral crepitation. No high-grade malalignment. Palpation: He has mild to moderate recurrent tenderness to palpation over the medial joint line and also over the retropatellar joint. He does have a mildly to moderately positive grind testing. This is certainly more prominent than last visit.   He is most tender along the anterior 3rd medial joint lines. He rates this at about of 5-6 out of 10. Rang of Motion:  Range of motion -5-120 bilaterally. Strength:  Strength testing 4+ out of 5 knee flexion extension. Special Tests:  He is a mildly to moderately positive grind test and pain with medial Helen's. I do not feel high-grade click but certainly this worsens his pain bilaterally. This is remarkable for crepitation than actual pop or click. No evidence of instability. Skin: There are no rashes, ulcerations or lesions. Distal neurovascular exam is intact. Gait: Improved gait. Reflex reflexes are preserved her     Additional Comments:      Additional Examinations:  Right Lower Extremity: Examination of the right lower extremity does not show any tenderness, deformity or injury. Range of motion is unremarkable. There is no gross instability. There are no rashes, ulcerations or lesions. Strength and tone are normal.  Left Lower Extremity: Examination of the left lower extremity does not show any tenderness, deformity or injury. Range of motion is unremarkable. There is no gross instability. There are no rashes, ulcerations or lesions. Strength and tone are normal.  Lower Back: Examination of the lower back does not show any tenderness, deformity or injury. Range of motion is unremarkable. There is no gross instability. There are no rashes, ulcerations or lesions. Strength and tone are normal.  Examination of the bilateral hip reveals intact skin. The patient demonstrates full painless range of motion with regards to flexion, abduction, internal and external rotation. There is not tenderness about the greater trochanter. There is a negative straight leg raise against resistance. Strength is 5/5 thorough out all planes.         Diagnostic Test Findings: Bilateral knee MRIs performed 5/5/2017 as listed above     Right knee MRI 5/5/2017  CONCLUSION:    1. Grade 3-4 chondromalacia of lateral facet of the patella with foci of osteochondral erosion    and chondral fissuring. 2. No meniscal tear or acute ligamentous injury. 3. Mild chronic lateral capsulitis and popliteal tendinopathy. 4. Enthesopathy of superior patellar tendon, no tear. Left knee MRI 2017  CONCLUSION:    1. Stable grade 3-4 chondromalacia of lateral facet of the patella with focal osteochondral    erosion, chondral thinning and fissuring. Grade 3 chondromalacia of superolateral trochlea with    chondral thinning and fissuring. 2. Stable chronic mild-moderate tendinopathy of superior patellar tendon with a 2cm slit-like    interstitial tear. 3. Progressed lateral capsulitis with chronic moderate tendinopathy of popliteal tendon. Does    the patient have gout? 4. 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 5. Stable grade 2 chondromalacia of weightbearing medial femorotibial compartment. MRI performed at [facility] 2021 is listed above  Narrative   Site: [facility] Rad #: [id number] #: [id number] Procedure: MR Right Knee w/o Contrast ; Reason for Exam: evaluate weight bearing osteoarthritis. previously documented grade 3-4 chondromalacia of patella. rule out medial meniscus tear   This document is confidential medical information. Unauthorized disclosure or use of this information is prohibited by law. If you are not the intended recipient of this document, please advise us by calling immediately [phone]. [facility] - [address] [place] [address]           Patient Name: Dewey Mancia   Case ID: [id number]   Patient : 1970   Referring Physician: Jeanette Atkins MD   Exam Date: 2021   Exam Description: MR Right Knee w/o Contrast            HISTORY:  Evaluate weightbearing osteoarthritis. Previously documented grade 3-4 chondromalacia    of patella. Evaluate for medial meniscus tear.        TECHNICAL FACTORS:  Long- and short-axis fat- and water-weighted images were performed. COMPARISON:  None. FINDINGS:  ACL, PCL, LCL, MCL, flexor mechanism, and extensor mechanism are intact. Thickened MCL. Sprain, scarring and capsulitis present. No fracture, AVN, or mass demonstrated. Patellar and quadriceps tendons are intact. Mild patellar and quadriceps tendon thickening at    the patellar attachment. Traction related bony hypertrophy present. Inflammation present within Hoffa's fat. Traction related bony hypertrophy involves the    anterior tibial tubercle. CONCLUSION:   1. Intermediate-grade to high-grade patellofemoral compartment chondromalacia. 2. Thickened MCL. Chronic sprain, scarring and capsulitis present. 3. No meniscal or cruciate tear. 4. No fracture, AVN or mass. 5. Distal quadriceps and proximal patellar tendon tendinopathy at their attachment to the    patella. No tear. Thank you for the opportunity to provide your interpretation. Yvon Raza MD       A: GM/mg 05/20/2021 10:00 PM   T: MG 05/20/2021 7:59 PM       We did do updated bilateral knee AP and PA weightbearing sunrise and lateral films in the office on 5/11/2021 which does show stable appearance to his underlying patellofemoral arthropathy with mild to moderate narrowing of his medial compartments. Assessment & Plan:    Encounter Diagnoses   Name Primary? Chronic pain of left knee Yes    Chronic pain of right knee     Chondromalacia of both patellae     Primary osteoarthritis of both knees     Chronic pain of both knees          Orders Placed This Encounter   Procedures    20610 - [place] DRAIN/INJECT LARGE JOINT/BURSA           Treatment Plan:  Treatment options were discussed with Felicia Durán. Clinically at this point he has become a little bit more sore over the last several weeks following completion of his last round of bilateral knee Euflexxa on 8/9/2022.   He had been working fairly long hours between 11 to 12 hours/day and the stable combination of driving and distance walking but was experiencing some foot and ankle complaints and is now off work for the time being by his podiatrist..  With his recent worsening of pain range between a 3-8 out of 10, we did to perform steroid injections to his knees bilaterally today using 2 cc of Celestone, 2 cc of Marcaine, 1 cc of Xylocaine. He will continue his bracing and exercise program.  He has been contemplating repeating viscosupplementation in the near future as this is typically helped him and he is hoping to avoid surgery as long as he can. He will contact us in the interim with questions or concerns      This dictation was performed with a verbal recognition program (DRAGON) and it was checked for errors. It is possible that there are still dictated errors within this office note. If so, please bring any errors to my attention for an addendum. All efforts were made to ensure that this office note is accurate.

## 2023-03-07 ENCOUNTER — OFFICE VISIT (OUTPATIENT)
Dept: ORTHOPEDIC SURGERY | Age: 53
End: 2023-03-07

## 2023-03-07 DIAGNOSIS — M25.561 CHRONIC PAIN OF RIGHT KNEE: ICD-10-CM

## 2023-03-07 DIAGNOSIS — M25.562 CHRONIC PAIN OF LEFT KNEE: ICD-10-CM

## 2023-03-07 DIAGNOSIS — G89.29 CHRONIC PAIN OF LEFT KNEE: ICD-10-CM

## 2023-03-07 DIAGNOSIS — M17.0 PRIMARY OSTEOARTHRITIS OF BOTH KNEES: Primary | ICD-10-CM

## 2023-03-07 DIAGNOSIS — M22.42 CHONDROMALACIA OF BOTH PATELLAE: ICD-10-CM

## 2023-03-07 DIAGNOSIS — G89.29 CHRONIC PAIN OF RIGHT KNEE: ICD-10-CM

## 2023-03-07 DIAGNOSIS — M22.41 CHONDROMALACIA OF BOTH PATELLAE: ICD-10-CM

## 2023-03-07 RX ORDER — HYALURONATE SODIUM 10 MG/ML
40 SYRINGE (ML) INTRAARTICULAR ONCE
Status: COMPLETED | OUTPATIENT
Start: 2023-03-07 | End: 2023-03-07

## 2023-03-07 RX ADMIN — Medication 40 MG: at 13:47

## 2023-03-07 NOTE — PROGRESS NOTES
Chief Complaint  Knee Pain (FU  BILATERAL KNEES )    followup recurrent bilateral knee pain with substantial patellofemoral arthropathy with underlying knee osteoarthritis with mild to moderate recurrent synovitis is post completion of the most recent round of viscosupplementation 8/9/2022 using Euflexxa bilateral    Amber Frost is a 45 y.o. male Gladis Woods is a 58-year-old white male reasonably well-controlled type II diabetic who is a walking  for the [employer]. He is being seen back today for follow-up on his bilateral knee symptomatic chondromalacia patella with underlying knee osteoarthritis. He last completed his Visco supplementation on 8/9/2022 which did provide very good pain relief for him but over the last few weeks with the weather change he has become a little bit more sore primarily anteriorly involving his knees. .      History of Present Illness for Follow Up Patient:      Gladis Woods is being seen in follow up today for [facility] claim for chronic bilateral knee pain. The patient rates their current pain as a  5-6  out of 10 on the pain scale. Activities aggravating current symptoms include walking long durations and after sitting for long periods of time. Activities relieving current symptoms include rest. Conservative treatment to date includes: rest, ice, NSAID X2 100 mg daily, knee bracing, home exercises, cortisone injections and Euflexxa most recently completed on 5/21/2019. Denies locking catching or true instability symptoms. He has continue with his exercise program does utilize his knee braces periodically. He is continued to lose weight and has lost about 50 pounds. Denies locking catching or instability symptoms. With I saw Gladis Woods in the office on 2/1/2022 when we completed his Euflexxa bilaterally for his underlying knee osteoarthritis and chondromalacia patella.   He did reasonably well throughout the remainder of [place] in early spring but over the last several weeks has become a little bit more sore and achy with pain in the range of 4-5 out of 10. 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 He has continued with his anti-inflammatory medications and has continued his weight loss at about 50 pounds and denies locking catching or true instability symptoms. He does continue with his anti-inflammatories and denies locking catching or true instability symptoms. Not be eligible for repeat viscosupplementation until August 2022. Last saw [place] in the office on 8/9/2022 and finished up Euflexxa for his underlying knee osteoarthritis with chondromalacia patella. He has been doing reasonly well but has been doing fairly long hours at work between 1 to 12 hours/day. There is no interim history of injury but over the last several weeks has had worsening pain ranging between a 3-7 out of 10. Distance walking stairclimbing continue to be problematic and is continue with his Celebrex 200 mg daily and has maintained his weight loss at about 50 pounds. He does have some occasional pain at night. No locking catching or true instability symptoms. His pain is primarily anterior in nature. He would not be eligible for repeat viscosupplementation for a few more weeks. He does have an occasional tough time getting to sleep. No high-grade swelling. He has been compliant with his exercise program and does utilize his brace episodically. His last hemoglobin A1c was 7.2 on 9/28/2022    [place] was last seen in the office on 1/24/2023 for his bilateral knee osteoarthritis and patellofemoral arthropathy. He has had progression of his knee pain symptoms which not range between a 4-8 out of 10. He is currently off work for his ankle per his podiatrist.  He is try to work on a stretching exercise program is continue with his braces and Celebrex. He is now having some discomfort at 9 and is eligible for repeat viscosupplementation.   He is doing well with regard to his sugars and denies active locking catching or true instability symptoms. He does believe his last round of viscosupplementation which was completed on 8/9/2022 is effectively worn out. We last saw [place] in the office on 2/28/2023 when started on conservative treatment for his very substantial bilateral knee patellofemoral arthropathy with underlying knee osteoarthritis and synovitis. He presents back today stating that he is doing much better and at most his pain is only a 2-3 out of 10. He would like to consider viscosupplementation once again which is helped him substantially as he is hoping to put off any surgery as long as he can. He is continue with his knee brace his exercise program and Celebrex. Once again he is off work due to treatment for what sounds like a Jac's deformity but does have follow-up with foot and ankle later this week and is hoping to start physical therapy. His last round of viscosupplementation was completed on 8/9/2022. Denies locking catching or true instability symptoms. He is having less pain with positional changes walking and stair climbing. Attest: I have reviewed and attest the documentation of the HPI documented by my . I will make any changes if necessary. Enc Date: 3/7/2023  Time: 1:58 PM  Provider: Susan Haywood MD        Social History     Tobacco Use    Smoking status: Never    Smokeless tobacco: Never   Vaping Use    Vaping Use: Never used   Substance Use Topics    Alcohol use: Yes     Alcohol/week: 0.0 standard drinks     Comment: occasionally    Drug use: No        Review of Systems  Pertinent items are noted in HPI  Review of systems reviewed from Patient History Form dated on 12/17/2019 and available in the patient's chart under the Media tab. Vital Signs     There were no vitals taken for this visit.       General Exam:   Constitutional: Patient is adequately groomed with no evidence of malnutrition  DTRs: Deep tendon reflexes are intact  Mental Status: The patient is oriented to time, place and person. The patient's mood and affect are appropriate. Lymphatic: The lymphatic examination bilaterally reveals all areas to be without enlargement or induration. Vascular: Examination reveals no swelling or calf tenderness. Peripheral pulses are palpable and 2+. Neurological: The patient has good coordination. There is no weakness or sensory deficit. Knee Examination  Inspection:  He is in slight varus and has trace  effusions bilaterally. There is marked patellofemoral crepitation. No high-grade malalignment. Palpation: He has only mild recurrent tenderness to palpation over the medial joint line and also over the retropatellar joint. He does have a mildly to moderately positive grind testing. This is certainly more prominent than last visit. He is most tender along the anterior 3rd medial joint lines. He rates this at about of 1-2 out of 10. Rang of Motion:  Range of motion -5-120 bilaterally. Strength:  Strength testing 4+ out of 5 knee flexion extension. Special Tests:  He is now only mildly positive grind test and pain with medial Helen's. I do not feel high-grade click but certainly this worsens his pain bilaterally. This is remarkable for crepitation than actual pop or click. No evidence of instability. Skin: There are no rashes, ulcerations or lesions. Distal neurovascular exam is intact. Gait: Improved gait. Reflex reflexes are preserved her     Additional Comments:      Additional Examinations:  Right Lower Extremity: Examination of the right lower extremity does not show any tenderness, deformity or injury. Range of motion is unremarkable. There is no gross instability. There are no rashes, ulcerations or lesions. Strength and tone are normal.  Left Lower Extremity: Examination of the left lower extremity does not show any tenderness, deformity or injury.   Range of motion is unremarkable. There is no gross instability. There are no rashes, ulcerations or lesions. Strength and tone are normal.  Lower Back: Examination of the lower back does not show any tenderness, deformity or injury. Range of motion is unremarkable. There is no gross instability. There are no rashes, ulcerations or lesions. Strength and tone are normal.  Examination of the bilateral hip reveals intact skin. The patient demonstrates full painless range of motion with regards to flexion, abduction, internal and external rotation. There is not tenderness about the greater trochanter. There is a negative straight leg raise against resistance. Strength is 5/5 thorough out all planes. Diagnostic Test Findings: Bilateral knee MRIs performed 5/5/2017 as listed above     Right knee MRI 5/5/2017  CONCLUSION:    1. Grade 3-4 chondromalacia of lateral facet of the patella with foci of osteochondral erosion    and chondral fissuring. 2. No meniscal tear or acute ligamentous injury. 3. Mild chronic lateral capsulitis and popliteal tendinopathy. 4. Enthesopathy of superior patellar tendon, no tear. Left knee MRI 5/5/2017  CONCLUSION:    1. Stable grade 3-4 chondromalacia of lateral facet of the patella with focal osteochondral    erosion, chondral thinning and fissuring. Grade 3 chondromalacia of superolateral trochlea with    chondral thinning and fissuring. 2. Stable chronic mild-moderate tendinopathy of superior patellar tendon with a 2cm slit-like    interstitial tear. 3. Progressed lateral capsulitis with chronic moderate tendinopathy of popliteal tendon. Does    the patient have gout? 4. 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 5. Stable grade 2 chondromalacia of weightbearing medial femorotibial compartment.       MRI performed at [facility] ([place], [place]) AT [facility] 5/19/2021 is listed above  Narrative   Site: [address] [place] [address] Rad #: [id number] #: [id number] Procedure: MR Right Knee w/o Contrast ; Reason for Exam: evaluate weight bearing osteoarthritis. previously documented grade 3-4 chondromalacia of patella. rule out medial meniscus tear   This document is confidential medical information. Unauthorized disclosure or use of this information is prohibited by law. If you are not the intended recipient of this document, please advise us by calling immediately [phone]. [facility] [address] [facility], [address] [place] [address]           Patient Name: Marleen Diez   Case ID: [id number]   Patient : 1970   Referring Physician: Yolanda Sterling MD   Exam Date: 2021   Exam Description: MR Right Knee w/o Contrast            HISTORY:  Evaluate weightbearing osteoarthritis. Previously documented grade 3-4 chondromalacia    of patella. Evaluate for medial meniscus tear. TECHNICAL FACTORS:  Long- and short-axis fat- and water-weighted images were performed. COMPARISON:  None. FINDINGS:  ACL, PCL, LCL, MCL, flexor mechanism, and extensor mechanism are intact. Thickened MCL. Sprain, scarring and capsulitis present. No fracture, AVN, or mass demonstrated. Patellar and quadriceps tendons are intact. Mild patellar and quadriceps tendon thickening at    the patellar attachment. Traction related bony hypertrophy present. Inflammation present within Hoffa's fat. Traction related bony hypertrophy involves the    anterior tibial tubercle. CONCLUSION:   1. Intermediate-grade to high-grade patellofemoral compartment chondromalacia. 2. Thickened MCL. Chronic sprain, scarring and capsulitis present. 3. No meniscal or cruciate tear. 4. No fracture, AVN or mass. 5. Distal quadriceps and proximal patellar tendon tendinopathy at their attachment to the    patella. No tear. Thank you for the opportunity to provide your interpretation.                Ramya Becker MD       A: YOBANY/ 2021 10:00 PM   T:  05/20/2021 7:59 PM       We did do updated bilateral knee AP and PA weightbearing sunrise and lateral films in the office on 5/11/2021 which does show stable appearance to his underlying patellofemoral arthropathy with mild to moderate narrowing of his medial compartments. Assessment & Plan:    Encounter Diagnoses   Name Primary? Primary osteoarthritis of both knees Yes    Chondromalacia of both patellae     Chronic pain of right knee     Chronic pain of left knee          Orders Placed This Encounter   Procedures    [place] ARTHROCENTESIS ASPIR&/INJ MAJOR JT/BURSA W/O US           Treatment Plan:  Treatment options were discussed with Romero Massey. Clinically at this point he has become a little bit more sore over the last several weeks following completion of his last round of bilateral knee Euflexxa on 8/9/2022. He had been working fairly long hours between 11 to 12 hours/day and the stable combination of driving and distance walking but was experiencing some foot and ankle complaints and is now off work for the time being by his podiatrist.. He presents back today stating clinically is feeling much better at least 60 to 70% better. He is now having pain only a range of 2-3 out of 10. After discussion of pros and cons of viscosupplementation, did receive his first injection of Euflexxa to his knees bilaterally. This was performed in the standard prefilled 2 cc syringe. He will continue his bracing and knee exercises as well as his Celebrex 200 mg daily we will see him back each next 2 weeks to finish up his Euflexxa series which she is typically done very well with. Icing and activity modification was discussed he does follow-up with podiatry later this week to see if he can advance to therapy or possibly back to work. He will contact us in the interim with questions or concerns. This dictation was performed with a verbal recognition program (DRAGON) and it was checked for errors.  It is possible that there are still dictated errors within this office note. If so, please bring any errors to my attention for an addendum. All efforts were made to ensure that this office note is accurate.

## 2023-03-13 ENCOUNTER — OFFICE VISIT (OUTPATIENT)
Dept: INTERNAL MEDICINE CLINIC | Age: 53
End: 2023-03-13
Payer: COMMERCIAL

## 2023-03-13 VITALS
HEART RATE: 55 BPM | WEIGHT: 227 LBS | OXYGEN SATURATION: 98 % | HEIGHT: 73 IN | DIASTOLIC BLOOD PRESSURE: 74 MMHG | RESPIRATION RATE: 14 BRPM | BODY MASS INDEX: 30.09 KG/M2 | SYSTOLIC BLOOD PRESSURE: 112 MMHG

## 2023-03-13 DIAGNOSIS — E11.40 TYPE 2 DIABETES MELLITUS WITH DIABETIC NEUROPATHY, WITHOUT LONG-TERM CURRENT USE OF INSULIN (HCC): ICD-10-CM

## 2023-03-13 DIAGNOSIS — K75.81 NASH (NONALCOHOLIC STEATOHEPATITIS): ICD-10-CM

## 2023-03-13 DIAGNOSIS — M10.9 GOUT, UNSPECIFIED CAUSE, UNSPECIFIED CHRONICITY, UNSPECIFIED SITE: ICD-10-CM

## 2023-03-13 DIAGNOSIS — E78.2 MIXED HYPERLIPIDEMIA: ICD-10-CM

## 2023-03-13 DIAGNOSIS — E78.2 HYPERLIPIDEMIA, MIXED: ICD-10-CM

## 2023-03-13 DIAGNOSIS — I10 BENIGN ESSENTIAL HTN: ICD-10-CM

## 2023-03-13 DIAGNOSIS — Z23 NEED FOR HEPATITIS B VACCINATION: ICD-10-CM

## 2023-03-13 DIAGNOSIS — E11.40 TYPE 2 DIABETES MELLITUS WITH DIABETIC NEUROPATHY, WITHOUT LONG-TERM CURRENT USE OF INSULIN (HCC): Primary | ICD-10-CM

## 2023-03-13 LAB
A/G RATIO: 1.5 (ref 1.1–2.2)
ALBUMIN SERPL-MCNC: 4.7 G/DL (ref 3.4–5)
ALP BLD-CCNC: 78 U/L (ref 40–129)
ALT SERPL-CCNC: 43 U/L (ref 10–40)
ANION GAP SERPL CALCULATED.3IONS-SCNC: 15 MMOL/L (ref 3–16)
AST SERPL-CCNC: 28 U/L (ref 15–37)
BASOPHILS ABSOLUTE: 0 K/UL (ref 0–0.2)
BASOPHILS RELATIVE PERCENT: 0.5 %
BILIRUB SERPL-MCNC: 0.5 MG/DL (ref 0–1)
BUN BLDV-MCNC: 14 MG/DL (ref 7–20)
CALCIUM SERPL-MCNC: 9.7 MG/DL (ref 8.3–10.6)
CHLORIDE BLD-SCNC: 100 MMOL/L (ref 99–110)
CHOLESTEROL, TOTAL: 202 MG/DL (ref 0–199)
CO2: 26 MMOL/L (ref 21–32)
CREAT SERPL-MCNC: 1 MG/DL (ref 0.9–1.3)
EOSINOPHILS ABSOLUTE: 0.1 K/UL (ref 0–0.6)
EOSINOPHILS RELATIVE PERCENT: 0.9 %
GFR SERPL CREATININE-BSD FRML MDRD: >60 ML/MIN/{1.73_M2}
GLUCOSE BLD-MCNC: 134 MG/DL (ref 70–99)
HCT VFR BLD CALC: 47.2 % (ref 40.5–52.5)
HDLC SERPL-MCNC: 38 MG/DL (ref 40–60)
HEMOGLOBIN: 16.3 G/DL (ref 13.5–17.5)
LDL CHOLESTEROL CALCULATED: 131 MG/DL
LYMPHOCYTES ABSOLUTE: 2.9 K/UL (ref 1–5.1)
LYMPHOCYTES RELATIVE PERCENT: 29.8 %
MCH RBC QN AUTO: 30.9 PG (ref 26–34)
MCHC RBC AUTO-ENTMCNC: 34.5 G/DL (ref 31–36)
MCV RBC AUTO: 89.6 FL (ref 80–100)
MONOCYTES ABSOLUTE: 0.6 K/UL (ref 0–1.3)
MONOCYTES RELATIVE PERCENT: 6.2 %
NEUTROPHILS ABSOLUTE: 6.2 K/UL (ref 1.7–7.7)
NEUTROPHILS RELATIVE PERCENT: 62.6 %
PDW BLD-RTO: 13.4 % (ref 12.4–15.4)
PLATELET # BLD: 156 K/UL (ref 135–450)
PMV BLD AUTO: 10.9 FL (ref 5–10.5)
POTASSIUM SERPL-SCNC: 4.4 MMOL/L (ref 3.5–5.1)
RBC # BLD: 5.27 M/UL (ref 4.2–5.9)
SODIUM BLD-SCNC: 141 MMOL/L (ref 136–145)
TOTAL PROTEIN: 7.8 G/DL (ref 6.4–8.2)
TRIGL SERPL-MCNC: 165 MG/DL (ref 0–150)
URIC ACID, SERUM: 5.4 MG/DL (ref 3.5–7.2)
VLDLC SERPL CALC-MCNC: 33 MG/DL
WBC # BLD: 9.9 K/UL (ref 4–11)

## 2023-03-13 PROCEDURE — 3078F DIAST BP <80 MM HG: CPT | Performed by: INTERNAL MEDICINE

## 2023-03-13 PROCEDURE — 90471 IMMUNIZATION ADMIN: CPT | Performed by: INTERNAL MEDICINE

## 2023-03-13 PROCEDURE — 99214 OFFICE O/P EST MOD 30 MIN: CPT | Performed by: INTERNAL MEDICINE

## 2023-03-13 PROCEDURE — 3074F SYST BP LT 130 MM HG: CPT | Performed by: INTERNAL MEDICINE

## 2023-03-13 PROCEDURE — 90739 HEPB VACC 2/4 DOSE ADULT IM: CPT | Performed by: INTERNAL MEDICINE

## 2023-03-13 RX ORDER — ROSUVASTATIN CALCIUM 20 MG/1
20 TABLET, COATED ORAL NIGHTLY
Qty: 90 TABLET | Refills: 1 | Status: SHIPPED | OUTPATIENT
Start: 2023-03-13

## 2023-03-13 SDOH — ECONOMIC STABILITY: FOOD INSECURITY: WITHIN THE PAST 12 MONTHS, THE FOOD YOU BOUGHT JUST DIDN'T LAST AND YOU DIDN'T HAVE MONEY TO GET MORE.: NEVER TRUE

## 2023-03-13 SDOH — ECONOMIC STABILITY: INCOME INSECURITY: HOW HARD IS IT FOR YOU TO PAY FOR THE VERY BASICS LIKE FOOD, HOUSING, MEDICAL CARE, AND HEATING?: NOT HARD AT ALL

## 2023-03-13 SDOH — ECONOMIC STABILITY: FOOD INSECURITY: WITHIN THE PAST 12 MONTHS, YOU WORRIED THAT YOUR FOOD WOULD RUN OUT BEFORE YOU GOT MONEY TO BUY MORE.: NEVER TRUE

## 2023-03-13 SDOH — ECONOMIC STABILITY: HOUSING INSECURITY
IN THE LAST 12 MONTHS, WAS THERE A TIME WHEN YOU DID NOT HAVE A STEADY PLACE TO SLEEP OR SLEPT IN A SHELTER (INCLUDING NOW)?: NO

## 2023-03-13 ASSESSMENT — PATIENT HEALTH QUESTIONNAIRE - PHQ9
SUM OF ALL RESPONSES TO PHQ QUESTIONS 1-9: 0
2. FEELING DOWN, DEPRESSED OR HOPELESS: 0
1. LITTLE INTEREST OR PLEASURE IN DOING THINGS: 0
SUM OF ALL RESPONSES TO PHQ QUESTIONS 1-9: 0
SUM OF ALL RESPONSES TO PHQ9 QUESTIONS 1 & 2: 0
SUM OF ALL RESPONSES TO PHQ QUESTIONS 1-9: 0
SUM OF ALL RESPONSES TO PHQ QUESTIONS 1-9: 0

## 2023-03-13 NOTE — PATIENT INSTRUCTIONS
To do list:    #1 Hepatitis B vaccine #2 in 1 month    #2 Update diabetic retinal eye exam    #3 begin checking blood sugars again    #4 Lifestyle modification including low calorie diet focusing on Low fat/low cholesterol and low carbohydrate intake, along with  increasing cardiovascular (aerobic) exercise.

## 2023-03-13 NOTE — PROGRESS NOTES
[facility]  Internal Medicine  Patient Encounter  Faheem Fraser D.O., [facility] [place]         Chief Complaint   Patient presents with    Check-Up    Medication Check         HPI: 45 y.o. male who has not been seen since September 2022 presents today requesting a checkup regarding the status of current issues listed below along with a medication review and reconciliation. He continues to see Dr. López Eubanks. He is getting Euflexxa for BL knee OA. He does the viscosupplementation twice yearly and steroid injection as needed. He injured his right heel. He does a lot of walking at work (). He developed a blister. He went to an urgent care and was given an antibiotic because the blister had ruptured. The blister is healed. He is moving to [place] into his parents home. Both parents have passed. Diabetes Mellitus Type II, Follow-up--   Lab Results   Component Value Date    LABA1C 7.2 09/28/2022     Lab Results   Component Value Date    .9 09/28/2022      Previous history---->  his A1c in October 2017 was 9.0%. His highest A1c was in 2014 at 11.3%. A1c had worsened from 3/10/2021 to 9/28/2022. His A1c back in March 2021 was 5.7%. Patient had been noncompliant with follow-up. He still has not had his diabetic retinal eye exam.  Patient does not exercise regularly. He does try to follow a carb controlled diet. Sugars-- He has not been checking. U.Microalbumin/Cr--9/21/2022  Last Eye exam--4/8/2021, overdue  + ACEi-- Lisinopril  Complications-- None. Insulin Treated? No.  + ASA  + Statin  Last LDL-110. Triglycerides 425  Foot exam-9/21/2022    Hyperlipidemia:    Lab Results   Component Value Date    LDLCALC see below 09/28/2022   His pravastatin was changed to Crestor. He has been out of medication for a couple weeks. His last LDL in September 2022 was 110. He is tolerating this well. He is requesting a refill. He denies any myalgias.     HTN--patient denies any headaches, dizziness, episodes of unilateral weakness, paresthesias, speech and visual disturbances. KOEHLER-- Liver enzymes had gotten better with weight loss. Overdue for lab. Gout-- He is on Allopurinol. He denies any flares. He is overdue for a uric acid level  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 tophus 10/10/2016    Depression     Hyperlipidemia     Hypertension     IBS (irritable bowel syndrome)     KOEHLER (nonalcoholic steatohepatitis) 10/17/2019    Osteoarthritis     Type 2 diabetes mellitus without complication, without long-term current use of insulin ([address] 10/10/2016    Vitamin D deficiency          MEDICATIONS:  Prior to Visit Medications    Medication Sig   JARDIANCE 10 MG tablet TAKE 1 TABLET BY MOUTH EVERY DAY   allopurinol (ZYLOPRIM) 100 MG tablet TAKE 1 TABLET BY MOUTH TWICE A DAY   sitaGLIPtan-metFORMIN (JANUMET)  MG per tablet TAKE 1 TABLET BY MOUTH TWICE A DAY WITH FOOD   lisinopril (PRINIVIL;ZESTRIL) 5 MG tablet TAKE 1 TABLET BY MOUTH EVERY DAY   rosuvastatin (CRESTOR) 20 MG tablet Take 1 tablet by mouth nightly   celecoxib (CELEBREX) 200 MG capsule TAKE 1 CAPSULE BY MOUTH EVERY DAY   blood glucose test strips (GLUCOMETER ELITE TEST STRIPS) strip Use one tid to check blood sugars. Patient uses ONE TOUCH STRIP   indomethacin (INDOCIN) 50 MG capsule Take 1 capsule by mouth 3 times daily as needed (gout attack) Take as needed for gout   ONE TOUCH ULTRA TEST strip TEST BLOOD SUGAR TWICE A DAY   aspirin EC 81 MG EC tablet Take 1 tablet by mouth daily. Review of Systems -as per HPI        OBJECTIVE:  Vitals:    03/13/23 0826   BP: 112/74   Pulse: 55   Resp: 14   SpO2: 98%   Weight: 227 lb (103 kg)   Height: 6' 1\" (1.854 m)     Body mass index is 29.95 kg/m².      Wt Readings from Last 3 Encounters:   03/13/23 227 lb (103 kg)   02/28/23 225 lb (102.1 kg)   01/24/23 225 lb (102.1 kg)     BP Readings from Last 3 Encounters:   03/13/23 112/74   09/21/22 118/86   03/10/21 120/72          GEN: NAD, A&O, Non-toxic, Obese  HEENT: NC/AT, CANDY, Anicteric. TMs normal.  Oral cavity clear. NECK: Supple. No thyromegaly or nodules. No soft tissue masses. No JVD. LYMPH: No C/SC nodes. CV: Regular rhythm. Rate normal.  No murmurs. No ectopy. PULM: CTA  EXT: No edema. GI: Abdomen is soft, NT, BS+, No hepatomegaly. NEURO: No focal or lateralizing deficits. Moves all extremities symmetrically. Gait without ataxia  VASC:  No carotid bruits. Pedal Pulses symmetric  SKIN:  No rashes. MS: No synovitis. ASSESSMENT/PLAN:    1. Type 2 diabetes mellitus with diabetic neuropathy, without long-term current use of insulin (HCC)  Diabetes control is uncertain. Unfortunately his last A1c suggested a deteriorating trend. He had increased from 5.7% to 7.2%. He has not been checking his blood sugars. Patient encouraged to do so  Continue Janumet  twice daily, Jardiance 10 mg daily. Could consider increasing Jardiance to 25 mg daily  Encouraged patient to update his diabetic retinal eye exam  - rosuvastatin (CRESTOR) 20 MG tablet; Take 1 tablet by mouth nightly  Dispense: 90 tablet; Refill: 1  - CBC with Auto Differential; Future  - Comprehensive Metabolic Panel; Future  - Lipid Panel; Future  - Hemoglobin A1C; Future    2. Hyperlipidemia, mixed  Condition control is uncertain. Due for lab  Refill Crestor. Encouraged patient to continue lifestyle modifications  - rosuvastatin (CRESTOR) 20 MG tablet; Take 1 tablet by mouth nightly  Dispense: 90 tablet; Refill: 1  - Comprehensive Metabolic Panel; Future  - Lipid Panel; Future    3. Benign essential HTN  Blood pressure is well controlled  Continue lisinopril 5 mg daily.   Patient has had hypotensive issues in the past  - CBC with Auto Differential; Future  - Comprehensive Metabolic Panel; Future  - Lipid Panel; Future    4. KOEHLER (nonalcoholic steatohepatitis)  Condition control is uncertain. Due for lab  Continue efforts with lifestyle modification and weight loss  - Comprehensive Metabolic Panel; Future    5. Mixed hyperlipidemia  Condition control is uncertain. Due for lab  Continue Crestor as part of his overall cardiovascular disease risk reduction strategy. - Comprehensive Metabolic Panel; Future  - Lipid Panel; Future    6. Gout, unspecified cause, unspecified chronicity, unspecified site  Asymptomatic  Check uric acid  Continue allopurinol  - Uric Acid; Future    7. Need for hepatitis B vaccination  Hepatitis B #1  Hepatitis B #2 in 1 month  - Hep B, HEPLISAV-B, (age 25 yrs+), IM, 0.5mL, 2-Dose        Discussed medications with patient who voiced understanding of their use and indication. All questions answered. This note was generated completely or in part utilizing Dragon dictation speech recognition software. Occasionally, words are mistranscribed and despite editing, the text may contain inaccuracies due to incorrect word recognition.   If further clarification is needed please contact the office at [phone]

## 2023-03-14 ENCOUNTER — OFFICE VISIT (OUTPATIENT)
Dept: ORTHOPEDIC SURGERY | Age: 53
End: 2023-03-14

## 2023-03-14 VITALS — BODY MASS INDEX: 30.09 KG/M2 | HEIGHT: 73 IN | WEIGHT: 227 LBS

## 2023-03-14 DIAGNOSIS — M25.561 CHRONIC PAIN OF RIGHT KNEE: ICD-10-CM

## 2023-03-14 DIAGNOSIS — M22.42 CHONDROMALACIA OF BOTH PATELLAE: ICD-10-CM

## 2023-03-14 DIAGNOSIS — G89.29 CHRONIC PAIN OF LEFT KNEE: ICD-10-CM

## 2023-03-14 DIAGNOSIS — M25.562 CHRONIC PAIN OF LEFT KNEE: ICD-10-CM

## 2023-03-14 DIAGNOSIS — M22.41 CHONDROMALACIA OF BOTH PATELLAE: ICD-10-CM

## 2023-03-14 DIAGNOSIS — M17.0 PRIMARY OSTEOARTHRITIS OF BOTH KNEES: Primary | ICD-10-CM

## 2023-03-14 DIAGNOSIS — G89.29 CHRONIC PAIN OF RIGHT KNEE: ICD-10-CM

## 2023-03-14 LAB
EST. AVERAGE GLUCOSE BLD GHB EST-MCNC: 162.8 MG/DL
HBA1C MFR BLD: 7.3 %

## 2023-03-14 RX ORDER — HYALURONATE SODIUM 10 MG/ML
40 SYRINGE (ML) INTRAARTICULAR ONCE
Status: COMPLETED | OUTPATIENT
Start: 2023-03-14 | End: 2023-03-14

## 2023-03-14 RX ADMIN — Medication 40 MG: at 13:39

## 2023-03-14 NOTE — PROGRESS NOTES
CC:  FU Knee Osteoarthritis with Viscosupplementation      Knee Pain (Bilateral Knees  Euflexxa 1)    followup recurrent bilateral knee pain with substantial patellofemoral arthropathy with underlying knee osteoarthritis with mild to moderate recurrent synovitis is post completion of the most recent round of viscosupplementation 2/1/2022 using Euflexxa bilateral    Audrey Castleman is a 45 y.o. male Salomón Mai is a 75-year-old white male type II diabetic who is a walking  for the [employer]. He is being seen back today for follow-up on his bilateral knee symptomatic chondromalacia patella with underlying knee osteoarthritis. He last completed his Visco supplementation on 7/13/2021 which did provide very good pain relief for him but over the last few weeks with the weather change he has become a little bit more sore primarily anteriorly involving his knees. .      History of Present Illness for Follow Up Patient:      Salomón Mai is being seen in follow up today for [facility] claim for chronic bilateral knee pain. The patient rates their current pain as a 5-6 out of 10 on the pain scale. Activities aggravating current symptoms include walking long durations and after sitting for long periods of time. Activities relieving current symptoms include rest. Conservative treatment to date includes: rest, ice, NSAID X2 100 mg daily, knee bracing, home exercises, cortisone injections and Euflexxa most recently completed on 5/21/2019. Denies locking catching or true instability symptoms. He has continue with his exercise program does utilize his knee braces periodically. He is continued to lose weight and has lost about 50 pounds. Denies locking catching or instability symptoms. With I saw Salomón Mai in the office on 2/1/2022 when we completed his Euflexxa bilaterally for his underlying knee osteoarthritis and chondromalacia patella.   He did reasonably well throughout the remainder of [place] in early spring but over the last several weeks has become a little bit more sore and achy with pain in the range of 4-5 out of 10. 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 He has continued with his anti-inflammatory medications and has continued his weight loss at about 50 pounds and denies locking catching or true instability symptoms. He does continue with his anti-inflammatories and denies locking catching or true instability symptoms. Not be eligible for repeat viscosupplementation until August 2022. Tyler Abrams was last seen in the office on 6/14/2022 and was continued on treatment for his substantial bilateral knee patellofemoral arthropathy with weightbearing arthropathy. He continues to do reasonably well but is eligible once again for viscosupplementation and would like to pursue this as he has for a number of years. For the most part his pain usually is in the range of 3-4 out of 10. He is lost quite a bit of weight and has kept this off. Denies locking catching or true instability symptoms. He is tolerated Celebrex but does need a refill and tries to work on his exercise program.    Tyler Abrams was last seen in the office on 7/26/2022 and was continued on treatment for his bilateral substantially patellofemoral neuropathy with some underlying weightbearing osteoarthritis. He is here to have his second Euflexxa injection series stating that his knee symptoms are doing very well at this point. Its only about a 2-3 out of 10. He has been working on his exercise program does utilize his brace and does take his Celebrex. Denies locking catching or true instability symptoms. He is here today for second Euflexxa injection is clinically doing well. We last saw Tyler Abrams in the office on 2/28/2023 when started on conservative treatment for his very substantial bilateral knee patellofemoral arthropathy with underlying knee osteoarthritis and synovitis.   He presents back today stating that he is doing much better and at most his pain is only a 2-3 out of 10. He would like to consider viscosupplementation once again which is helped him substantially as he is hoping to put off any surgery as long as he can. He is continue with his knee brace his exercise program and Celebrex. Once again he is off work due to treatment for what sounds like a Jac's deformity but does have follow-up with foot and ankle later this week and is hoping to start physical therapy. His last round of viscosupplementation was completed on 8/9/2022. Denies locking catching or true instability symptoms. He is having less pain with positional changes walking and stair climbing. When I saw Roxy Jacob in the office on 3/7/2023 and was started on viscosupplementation once again for his knees. He is doing reasonly well at this time although the cold weather has become a little bit achy but for the most part his pain is only between 1-3 out of 10. Is here today for second Euflexxa injections continue with his knee bracing exercise program and Celebrex. 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 He has been trying to work on his exercise and stretching program and does ice. Denies locking catching or true instability symptoms. Once again is done typically well with viscosupplementation in the past.    PE: no substantial change in exam.    Assessment:  Knee Osteoarthritis with viscosupplementation      PROCEDURE NOTE:    PRE-PROCEDURE DIAGNOSIS: DJD knee    POST-PROCEDURE DIAGNOSIS: DJD knee    Injection #2 of Euflexxa. PROCEDURE:  With the patient's permission, his bilaterally knee was prepped in standard sterile fashion with Betadine and Alcohol and the prefilled 2cc injection of Euflexxa was injected intra-articularly into the bilaterally knee via a superolateral approach without difficulty. The patient tolerated this well without difficulty. A band-aid was applied.      POST-PROCEDURE INSTRUCTIONS GIVEN TO PATIENT: The patient was advised to ice the knee for 15-20 minutes to relieve any injection site related pain. FOLLOW-UP: as directed. He will continue with his Celebrex 200 mg daily his home exercise program and bracing. We will see him back next week to finish up his Euflexxa series. He once again is currently off work recovering from Jac's deformity surgery. We will see him back next week to finish up his Euflexxa series. He is typically done well with this. He will contact us in the interim with questions or concerns.

## 2023-03-21 ENCOUNTER — OFFICE VISIT (OUTPATIENT)
Dept: ORTHOPEDIC SURGERY | Age: 53
End: 2023-03-21

## 2023-03-21 VITALS — WEIGHT: 227 LBS | HEIGHT: 73 IN | BODY MASS INDEX: 30.09 KG/M2

## 2023-03-21 DIAGNOSIS — M22.42 CHONDROMALACIA OF BOTH PATELLAE: ICD-10-CM

## 2023-03-21 DIAGNOSIS — M22.41 CHONDROMALACIA OF BOTH PATELLAE: ICD-10-CM

## 2023-03-21 DIAGNOSIS — M17.0 PRIMARY OSTEOARTHRITIS OF BOTH KNEES: Primary | ICD-10-CM

## 2023-03-21 RX ORDER — HYALURONATE SODIUM 10 MG/ML
40 SYRINGE (ML) INTRAARTICULAR ONCE
Status: COMPLETED | OUTPATIENT
Start: 2023-03-21 | End: 2023-03-21

## 2023-03-21 RX ADMIN — Medication 40 MG: at 13:25

## 2023-03-21 NOTE — PROGRESS NOTES
doing much better and at most his pain is only a 2-3 out of 10. He would like to consider viscosupplementation once again which is helped him substantially as he is hoping to put off any surgery as long as he can. He is continue with his knee brace his exercise program and Celebrex. Once again he is off work due to treatment for what sounds like a Jac's deformity but does have follow-up with foot and ankle later this week and is hoping to start physical therapy. His last round of viscosupplementation was completed on 8/9/2022. Denies locking catching or true instability symptoms. He is having less pain with positional changes walking and stair climbing. When I saw Grisel Tran in the office on 3/7/2023 and was started on viscosupplementation once again for his knees. He is doing reasonly well at this time although the cold weather has become a little bit achy but for the most part his pain is only between 1-3 out of 10. Is here today for second Euflexxa injections continue with his knee bracing exercise program and Celebrex. 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 He has been trying to work on his exercise and stretching program and does ice. Denies locking catching or true instability symptoms. Once again is done typically well with viscosupplementation in the past.    We last saw Anabell Campbell in the office on 3/14/2023 and was continued on viscosupplementation for his underlying knee osteoarthritis with patellofemoral arthropathy. The medications are definitely beginning to kick in and he is doing much better. He is not having substantial pain at rest or night and is not having pain with distance walking going up and down stairs currently. He has been continuing to work on his catching program is continue with the Celebrex.   Once again he is still off work recovering from surgery on his Jac's deformity but has started a functional progression in therapy and is

## 2023-05-02 DIAGNOSIS — M17.0 PRIMARY OSTEOARTHRITIS OF BOTH KNEES: ICD-10-CM

## 2023-05-02 DIAGNOSIS — M25.561 CHRONIC PAIN OF BOTH KNEES: ICD-10-CM

## 2023-05-02 DIAGNOSIS — M25.561 CHRONIC PAIN OF RIGHT KNEE: ICD-10-CM

## 2023-05-02 DIAGNOSIS — G89.29 CHRONIC PAIN OF BOTH KNEES: ICD-10-CM

## 2023-05-02 DIAGNOSIS — G89.29 CHRONIC PAIN OF RIGHT KNEE: ICD-10-CM

## 2023-05-02 DIAGNOSIS — G89.29 CHRONIC PAIN OF LEFT KNEE: ICD-10-CM

## 2023-05-02 DIAGNOSIS — M25.562 CHRONIC PAIN OF LEFT KNEE: ICD-10-CM

## 2023-05-02 DIAGNOSIS — M22.42 CHONDROMALACIA OF BOTH PATELLAE: ICD-10-CM

## 2023-05-02 DIAGNOSIS — M22.41 CHONDROMALACIA OF BOTH PATELLAE: ICD-10-CM

## 2023-05-02 DIAGNOSIS — M25.562 CHRONIC PAIN OF BOTH KNEES: ICD-10-CM

## 2023-05-02 RX ORDER — CELECOXIB 200 MG/1
CAPSULE ORAL
Qty: 90 CAPSULE | Refills: 2 | Status: SHIPPED | OUTPATIENT
Start: 2023-05-02

## 2023-06-22 ENCOUNTER — OFFICE VISIT (OUTPATIENT)
Dept: ORTHOPEDIC SURGERY | Age: 53
End: 2023-06-22

## 2023-06-22 DIAGNOSIS — E11.9 CONTROLLED TYPE 2 DIABETES MELLITUS WITHOUT COMPLICATION, WITHOUT LONG-TERM CURRENT USE OF INSULIN (HCC): ICD-10-CM

## 2023-06-22 DIAGNOSIS — I10 BENIGN ESSENTIAL HTN: ICD-10-CM

## 2023-06-22 DIAGNOSIS — M25.562 CHRONIC PAIN OF LEFT KNEE: Primary | ICD-10-CM

## 2023-06-22 DIAGNOSIS — M22.42 CHONDROMALACIA OF BOTH PATELLAE: ICD-10-CM

## 2023-06-22 DIAGNOSIS — G89.29 CHRONIC PAIN OF RIGHT KNEE: ICD-10-CM

## 2023-06-22 DIAGNOSIS — M17.0 PRIMARY OSTEOARTHRITIS OF BOTH KNEES: ICD-10-CM

## 2023-06-22 DIAGNOSIS — G89.29 CHRONIC PAIN OF LEFT KNEE: Primary | ICD-10-CM

## 2023-06-22 DIAGNOSIS — M25.561 CHRONIC PAIN OF BOTH KNEES: ICD-10-CM

## 2023-06-22 DIAGNOSIS — M25.561 CHRONIC PAIN OF RIGHT KNEE: ICD-10-CM

## 2023-06-22 DIAGNOSIS — M22.41 CHONDROMALACIA OF BOTH PATELLAE: ICD-10-CM

## 2023-06-22 DIAGNOSIS — M25.562 CHRONIC PAIN OF BOTH KNEES: ICD-10-CM

## 2023-06-22 DIAGNOSIS — G89.29 CHRONIC PAIN OF BOTH KNEES: ICD-10-CM

## 2023-06-22 RX ORDER — BUPIVACAINE HYDROCHLORIDE 2.5 MG/ML
4 INJECTION, SOLUTION INFILTRATION; PERINEURAL ONCE
Status: DISCONTINUED | OUTPATIENT
Start: 2023-06-22 | End: 2023-06-22

## 2023-06-22 RX ORDER — LIDOCAINE HYDROCHLORIDE 10 MG/ML
2 INJECTION, SOLUTION INFILTRATION; PERINEURAL ONCE
Status: DISCONTINUED | OUTPATIENT
Start: 2023-06-22 | End: 2023-06-22

## 2023-06-22 RX ORDER — CELECOXIB 200 MG/1
200 CAPSULE ORAL DAILY
Qty: 30 CAPSULE | Refills: 3 | Status: SHIPPED | OUTPATIENT
Start: 2023-06-22

## 2023-06-22 RX ORDER — METHYLPREDNISOLONE 4 MG/1
TABLET ORAL
Qty: 21 KIT | Refills: 0 | Status: SHIPPED | OUTPATIENT
Start: 2023-06-22

## 2023-06-22 RX ORDER — BETAMETHASONE SODIUM PHOSPHATE AND BETAMETHASONE ACETATE 3; 3 MG/ML; MG/ML
24 INJECTION, SUSPENSION INTRA-ARTICULAR; INTRALESIONAL; INTRAMUSCULAR; SOFT TISSUE ONCE
Status: DISCONTINUED | OUTPATIENT
Start: 2023-06-22 | End: 2023-06-22

## 2023-06-22 NOTE — PATIENT INSTRUCTIONS
If you're currently taking an anti-inflammatory such as advil, aleve, ibuprofen, diclofenac, naproxen, meloxicam, celebrex, or nabumetone, please stop. Take Medrol first for 6 days. This is a steroid pack. Flip the package over to the foil side and the directions will tell you to start with 6 pills the first day, 5 pills the second day, etc. Please do not take any other anti-inflammatories with the medrol dose aldo as this can upset your stomach. If something else is needed, you may take extra strength tylenol.      Once you are finished with the medrol, then you may re-start or start your anti-inflammatory: CELEBREX

## 2023-06-22 NOTE — PROGRESS NOTES
7:59 PM       We did do updated bilateral knee AP and PA weightbearing sunrise and lateral films in the office on 5/11/2021 which does show stable appearance to his underlying patellofemoral arthropathy with mild to moderate narrowing of his medial compartments. Assessment & Plan:    Encounter Diagnoses   Name Primary? Chronic pain of left knee Yes    Chronic pain of right knee     Chondromalacia of both patellae     Chronic pain of both knees     Primary osteoarthritis of both knees          No orders of the defined types were placed in this encounter. Treatment Plan:  Treatment options were discussed with Josephine Fernandez. Clinically at this point he has become a little bit more sore over the last several weeks following completion of his last round of bilateral knee Euflexxa on 3/21/2023. He had been working fairly long hours between 11 to 12 hours/day and the stable combination of standing and distance walking have aggravated his symptoms. As his case is Workmen's Comp., we will request bilateral knee steroid injections which he has had in the past.  We did place him on a Medrol Dosepak to be followed by continue with his Celebrex 200 mg daily and continue with his bracing and his home-based exercise program.  We did write for modified work duties to limit his work shifts to 8 hours/day 40 hours/week for the next 6 weeks or so and hopefully we can get his steroid injections approved fairly quickly to try to give him some additional relief. Icing and activity modification was discussed we will see him back in a couple weeks for follow-up. He will contact us in the interim with questions or concerns. This dictation was performed with a verbal recognition program (DRAGON) and it was checked for errors. It is possible that there are still dictated errors within this office note. If so, please bring any errors to my attention for an addendum.  All efforts were made to ensure that this office

## 2023-06-23 ENCOUNTER — TELEPHONE (OUTPATIENT)
Dept: ORTHOPEDIC SURGERY | Age: 53
End: 2023-06-23

## 2023-06-23 RX ORDER — SITAGLIPTIN AND METFORMIN HYDROCHLORIDE 1000; 50 MG/1; MG/1
TABLET, FILM COATED ORAL
Qty: 180 TABLET | Refills: 1 | Status: SHIPPED | OUTPATIENT
Start: 2023-06-23

## 2023-06-23 RX ORDER — LISINOPRIL 5 MG/1
TABLET ORAL
Qty: 90 TABLET | Refills: 1 | Status: SHIPPED | OUTPATIENT
Start: 2023-06-23

## 2023-06-23 NOTE — TELEPHONE ENCOUNTER
[employer]OL AMEND CRITERIA:    To amend OA a Letter of Medical Necessity will be needed to send with request listing:      Well rationalized explanation as how the requested diagnosis is/are related to the injured workers date of injury and whether by direct cause, proximate or aggravation of an existing condition. If aggravation please note if temporary or permanent. Please let WC know when this is ready.

## 2023-06-26 NOTE — TELEPHONE ENCOUNTER
Is this letter a new requirement from the DOL? His cortisone and euflexxa injections were previously approved by [employer] under this diagnosis code in February of this calendar year and completed on 3/21/23. We saw him in follow up so that we could request the next round that he would be eligible for in the fall. He could have cortisone injections now, but no euflexxa until 9/22/23, which we are aware of. Please advise.

## 2023-07-12 ENCOUNTER — TELEPHONE (OUTPATIENT)
Dept: ORTHOPEDIC SURGERY | Age: 53
End: 2023-07-12

## 2023-07-12 NOTE — TELEPHONE ENCOUNTER
SPOKE TO PATIENT AND LET HIM KNOW THAT CORTISONE INJECTIONS WERE APPROVED BY [employer]. SCHEDULED FOR NEXT WEEK 7/20.

## 2023-07-20 ENCOUNTER — OFFICE VISIT (OUTPATIENT)
Dept: ORTHOPEDIC SURGERY | Age: 53
End: 2023-07-20

## 2023-07-20 DIAGNOSIS — G89.29 CHRONIC PAIN OF BOTH KNEES: ICD-10-CM

## 2023-07-20 DIAGNOSIS — M17.0 PRIMARY OSTEOARTHRITIS OF BOTH KNEES: ICD-10-CM

## 2023-07-20 DIAGNOSIS — M25.562 CHRONIC PAIN OF LEFT KNEE: Primary | ICD-10-CM

## 2023-07-20 DIAGNOSIS — M22.42 CHONDROMALACIA OF BOTH PATELLAE: ICD-10-CM

## 2023-07-20 DIAGNOSIS — G89.29 CHRONIC PAIN OF LEFT KNEE: Primary | ICD-10-CM

## 2023-07-20 DIAGNOSIS — M25.561 CHRONIC PAIN OF BOTH KNEES: ICD-10-CM

## 2023-07-20 DIAGNOSIS — M22.41 CHONDROMALACIA OF BOTH PATELLAE: ICD-10-CM

## 2023-07-20 DIAGNOSIS — G89.29 CHRONIC PAIN OF RIGHT KNEE: ICD-10-CM

## 2023-07-20 DIAGNOSIS — M25.562 CHRONIC PAIN OF BOTH KNEES: ICD-10-CM

## 2023-07-20 DIAGNOSIS — M25.561 CHRONIC PAIN OF RIGHT KNEE: ICD-10-CM

## 2023-07-20 RX ORDER — LIDOCAINE HYDROCHLORIDE 10 MG/ML
2 INJECTION, SOLUTION INFILTRATION; PERINEURAL ONCE
Status: COMPLETED | OUTPATIENT
Start: 2023-07-20 | End: 2023-07-20

## 2023-07-20 RX ORDER — BUPIVACAINE HYDROCHLORIDE 2.5 MG/ML
4 INJECTION, SOLUTION INFILTRATION; PERINEURAL ONCE
Status: COMPLETED | OUTPATIENT
Start: 2023-07-20 | End: 2023-07-20

## 2023-07-20 RX ORDER — BETAMETHASONE SODIUM PHOSPHATE AND BETAMETHASONE ACETATE 3; 3 MG/ML; MG/ML
24 INJECTION, SUSPENSION INTRA-ARTICULAR; INTRALESIONAL; INTRAMUSCULAR; SOFT TISSUE ONCE
Status: COMPLETED | OUTPATIENT
Start: 2023-07-20 | End: 2023-07-20

## 2023-07-20 RX ADMIN — BUPIVACAINE HYDROCHLORIDE 10 MG: 2.5 INJECTION, SOLUTION INFILTRATION; PERINEURAL at 12:56

## 2023-07-20 RX ADMIN — BETAMETHASONE SODIUM PHOSPHATE AND BETAMETHASONE ACETATE 24 MG: 3; 3 INJECTION, SUSPENSION INTRA-ARTICULAR; INTRALESIONAL; INTRAMUSCULAR; SOFT TISSUE at 12:53

## 2023-07-20 RX ADMIN — LIDOCAINE HYDROCHLORIDE 2 ML: 10 INJECTION, SOLUTION INFILTRATION; PERINEURAL at 12:56

## 2023-07-20 NOTE — PROGRESS NOTES
Chief Complaint  Knee Pain (CK JUDSON KNEES/REQ CORTISONE )      followup recurrent bilateral knee pain with substantial patellofemoral arthropathy with underlying knee osteoarthritis with mild to moderate recurrent synovitis is post completion of the most recent round of viscosupplementation 3/14/2023 using Euflexxa bilateral    Miguel Ball is a 52 y.o. male Sujatha Dunn is a 49-year-old white male reasonably well-controlled type II diabetic who is a walking  for the [employer]. He is being seen back today for follow-up on his bilateral knee symptomatic chondromalacia patella with underlying knee osteoarthritis. He last completed his Visco supplementation on 8/9/2022 which did provide very good pain relief for him but over the last few weeks with the weather change he has become a little bit more sore primarily anteriorly involving his knees. .      History of Present Illness for Follow Up Patient:      Sujatha Dunn is being seen in follow up today for [facility] claim for chronic bilateral knee pain. The patient rates their current pain as a  5-6  out of 10 on the pain scale. Activities aggravating current symptoms include walking long durations and after sitting for long periods of time. Activities relieving current symptoms include rest. Conservative treatment to date includes: rest, ice, NSAID X2 100 mg daily, knee bracing, home exercises, cortisone injections and Euflexxa most recently completed on 5/21/2019. Denies locking catching or true instability symptoms. He has continue with his exercise program does utilize his knee braces periodically. He is continued to lose weight and has lost about 50 pounds. Denies locking catching or instability symptoms. Sujatha Dunn was last seen in the office on 1/24/2023 for his bilateral knee osteoarthritis and patellofemoral arthropathy. He has had progression of his knee pain symptoms which not range between a 4-8 out of 10.   He is currently off work

## 2023-08-10 ENCOUNTER — TELEPHONE (OUTPATIENT)
Dept: ORTHOPEDIC SURGERY | Age: 53
End: 2023-08-10

## 2023-08-10 ENCOUNTER — OFFICE VISIT (OUTPATIENT)
Dept: INTERNAL MEDICINE CLINIC | Age: 53
End: 2023-08-10
Payer: COMMERCIAL

## 2023-08-10 VITALS
BODY MASS INDEX: 29.69 KG/M2 | HEART RATE: 70 BPM | OXYGEN SATURATION: 97 % | HEIGHT: 73 IN | SYSTOLIC BLOOD PRESSURE: 112 MMHG | DIASTOLIC BLOOD PRESSURE: 66 MMHG | RESPIRATION RATE: 14 BRPM | WEIGHT: 224 LBS

## 2023-08-10 DIAGNOSIS — Z86.010 HISTORY OF COLON POLYPS: ICD-10-CM

## 2023-08-10 DIAGNOSIS — E11.40 TYPE 2 DIABETES MELLITUS WITH DIABETIC NEUROPATHY, WITHOUT LONG-TERM CURRENT USE OF INSULIN (HCC): ICD-10-CM

## 2023-08-10 DIAGNOSIS — M10.9 GOUT, UNSPECIFIED CAUSE, UNSPECIFIED CHRONICITY, UNSPECIFIED SITE: ICD-10-CM

## 2023-08-10 DIAGNOSIS — I10 BENIGN ESSENTIAL HTN: ICD-10-CM

## 2023-08-10 DIAGNOSIS — K75.81 NASH (NONALCOHOLIC STEATOHEPATITIS): ICD-10-CM

## 2023-08-10 DIAGNOSIS — Z12.5 SCREENING FOR PROSTATE CANCER: ICD-10-CM

## 2023-08-10 DIAGNOSIS — E11.40 TYPE 2 DIABETES MELLITUS WITH DIABETIC NEUROPATHY, WITHOUT LONG-TERM CURRENT USE OF INSULIN (HCC): Primary | ICD-10-CM

## 2023-08-10 DIAGNOSIS — Z12.11 SCREEN FOR COLON CANCER: ICD-10-CM

## 2023-08-10 DIAGNOSIS — E78.2 MIXED HYPERLIPIDEMIA: ICD-10-CM

## 2023-08-10 PROCEDURE — 99214 OFFICE O/P EST MOD 30 MIN: CPT | Performed by: INTERNAL MEDICINE

## 2023-08-10 PROCEDURE — 3051F HG A1C>EQUAL 7.0%<8.0%: CPT | Performed by: INTERNAL MEDICINE

## 2023-08-10 PROCEDURE — 3078F DIAST BP <80 MM HG: CPT | Performed by: INTERNAL MEDICINE

## 2023-08-10 PROCEDURE — 3074F SYST BP LT 130 MM HG: CPT | Performed by: INTERNAL MEDICINE

## 2023-08-10 NOTE — PATIENT INSTRUCTIONS
To do list:    #1 Schedule your diabetic retinal eye exam    #2 schedule your colonoscopy for colon cancer screening

## 2023-08-10 NOTE — TELEPHONE ENCOUNTER
General Question     Subject: RTW NOTE EXTENSION  Patient and /or Facility Request: Nikos Mendoza  Contact Number: [phone]       IW CALLING FOR AN EXTENSION OF RTW NOTE/SLIP. WOULD LIKE A NOTE EXTENDING RESTRICTIONS THRU SEPT 2023 *of work hours of 8hrs daily 40 hrs weekly. *  PLEASE ADVISE    **Previous Note  To Whom It May Concern: It is my medical opinion that Mark Zamora may return to work on 6/23/23 with the following restrictions: Please limit work day to 8 hours per day, 40 hours per week for the next 6 weeks .

## 2023-08-11 LAB
ALBUMIN SERPL-MCNC: 4.5 G/DL (ref 3.4–5)
ALBUMIN/GLOB SERPL: 1.6 {RATIO} (ref 1.1–2.2)
ALP SERPL-CCNC: 84 U/L (ref 40–129)
ALT SERPL-CCNC: 44 U/L (ref 10–40)
ANION GAP SERPL CALCULATED.3IONS-SCNC: 14 MMOL/L (ref 3–16)
AST SERPL-CCNC: 39 U/L (ref 15–37)
BASOPHILS # BLD: 0 K/UL (ref 0–0.2)
BASOPHILS NFR BLD: 0.1 %
BILIRUB SERPL-MCNC: 0.4 MG/DL (ref 0–1)
BUN SERPL-MCNC: 16 MG/DL (ref 7–20)
CALCIUM SERPL-MCNC: 10 MG/DL (ref 8.3–10.6)
CHLORIDE SERPL-SCNC: 95 MMOL/L (ref 99–110)
CHOLEST SERPL-MCNC: 88 MG/DL (ref 0–199)
CO2 SERPL-SCNC: 24 MMOL/L (ref 21–32)
CREAT SERPL-MCNC: 0.9 MG/DL (ref 0.9–1.3)
CREAT UR-MCNC: 139 MG/DL (ref 39–259)
DEPRECATED RDW RBC AUTO: 14 % (ref 12.4–15.4)
EOSINOPHIL # BLD: 0.1 K/UL (ref 0–0.6)
EOSINOPHIL NFR BLD: 1.5 %
EST. AVERAGE GLUCOSE BLD GHB EST-MCNC: 203 MG/DL
GFR SERPLBLD CREATININE-BSD FMLA CKD-EPI: >60 ML/MIN/{1.73_M2}
GLUCOSE SERPL-MCNC: 186 MG/DL (ref 70–99)
HBA1C MFR BLD: 8.7 %
HCT VFR BLD AUTO: 45 % (ref 40.5–52.5)
HDLC SERPL-MCNC: 35 MG/DL (ref 40–60)
HGB BLD-MCNC: 15.3 G/DL (ref 13.5–17.5)
LDLC SERPL CALC-MCNC: 25 MG/DL
LYMPHOCYTES # BLD: 3.1 K/UL (ref 1–5.1)
LYMPHOCYTES NFR BLD: 31.7 %
MCH RBC QN AUTO: 30.9 PG (ref 26–34)
MCHC RBC AUTO-ENTMCNC: 33.9 G/DL (ref 31–36)
MCV RBC AUTO: 91.1 FL (ref 80–100)
MICROALBUMIN UR DL<=1MG/L-MCNC: <1.2 MG/DL
MICROALBUMIN/CREAT UR: NORMAL MG/G (ref 0–30)
MONOCYTES # BLD: 0.5 K/UL (ref 0–1.3)
MONOCYTES NFR BLD: 5.4 %
NEUTROPHILS # BLD: 6 K/UL (ref 1.7–7.7)
NEUTROPHILS NFR BLD: 61.3 %
PLATELET # BLD AUTO: 159 K/UL (ref 135–450)
PMV BLD AUTO: 10.7 FL (ref 5–10.5)
POTASSIUM SERPL-SCNC: 4.1 MMOL/L (ref 3.5–5.1)
PROT SERPL-MCNC: 7.3 G/DL (ref 6.4–8.2)
PSA SERPL DL<=0.01 NG/ML-MCNC: 0.46 NG/ML (ref 0–4)
RBC # BLD AUTO: 4.95 M/UL (ref 4.2–5.9)
SODIUM SERPL-SCNC: 133 MMOL/L (ref 136–145)
TRIGL SERPL-MCNC: 139 MG/DL (ref 0–150)
TSH SERPL DL<=0.005 MIU/L-ACNC: 2.49 UIU/ML (ref 0.27–4.2)
VLDLC SERPL CALC-MCNC: 28 MG/DL
WBC # BLD AUTO: 9.8 K/UL (ref 4–11)

## 2023-09-21 ENCOUNTER — TELEPHONE (OUTPATIENT)
Dept: ORTHOPEDIC SURGERY | Age: 53
End: 2023-09-21

## 2023-09-21 NOTE — TELEPHONE ENCOUNTER
C9 DENIED FOR [facility] INJECTION PER DOL WEB SITE, 64-- 10-. DX CODE M17.0 WAS USED. Patient states he has received both a denial and approval letter.

## 2023-09-22 NOTE — TELEPHONE ENCOUNTER
Spoke to patient, advised him to reach out to  about euflexxa injections and diagnoses approved for his case. Essentially patient is approved for chondromalacia of patella (bilateral), which is not a DOL approved code for the use of euflexxa. In the past we have been able to get them approved under the OA code (semantics between chondromalacia and OA). The [employer]OL has updated his case to ICD 10 codes and are denying his injections at this time. Patient has been receiving euflexxa injections every 6 months for years. Told him we could attempt to add the diagnosis of OA, bilateral knees, but that it might potentially be a struggle. Told him to give me a call back once he speaks with his .

## 2023-10-09 ENCOUNTER — TELEPHONE (OUTPATIENT)
Dept: ORTHOPEDIC SURGERY | Age: 53
End: 2023-10-09

## 2023-10-09 NOTE — TELEPHONE ENCOUNTER
Scanned in to media a  request for a Narrative report from [employer], In regards to denial of Euflexxa injection

## 2023-10-09 NOTE — TELEPHONE ENCOUNTER
Letter has been written and is in the letters tab in response for a narrative. Can this be sent in reply to DOL please. Thanks.

## 2023-11-14 RX ORDER — ALLOPURINOL 100 MG/1
100 TABLET ORAL 2 TIMES DAILY
Qty: 180 TABLET | Refills: 1 | Status: SHIPPED | OUTPATIENT
Start: 2023-11-14

## 2024-01-10 ENCOUNTER — TELEPHONE (OUTPATIENT)
Dept: ORTHOPEDIC SURGERY | Age: 54
End: 2024-01-10

## 2024-01-10 NOTE — TELEPHONE ENCOUNTER
Spoke to patient and let them know that euflexxa injections have been approved for their BILATERAL knee. Scheduled patient for those injections.

## 2024-01-11 ENCOUNTER — OFFICE VISIT (OUTPATIENT)
Dept: ORTHOPEDIC SURGERY | Age: 54
End: 2024-01-11

## 2024-01-11 DIAGNOSIS — G89.29 CHRONIC PAIN OF BOTH KNEES: ICD-10-CM

## 2024-01-11 DIAGNOSIS — M25.562 CHRONIC PAIN OF BOTH KNEES: ICD-10-CM

## 2024-01-11 DIAGNOSIS — M22.42 CHONDROMALACIA OF BOTH PATELLAE: ICD-10-CM

## 2024-01-11 DIAGNOSIS — M25.561 CHRONIC PAIN OF BOTH KNEES: ICD-10-CM

## 2024-01-11 DIAGNOSIS — M22.41 CHONDROMALACIA OF BOTH PATELLAE: ICD-10-CM

## 2024-01-11 DIAGNOSIS — M17.0 PRIMARY OSTEOARTHRITIS OF BOTH KNEES: Primary | ICD-10-CM

## 2024-01-11 RX ORDER — CELECOXIB 200 MG/1
200 CAPSULE ORAL DAILY
Qty: 30 CAPSULE | Refills: 3 | Status: SHIPPED | OUTPATIENT
Start: 2024-01-11

## 2024-01-11 RX ORDER — METHYLPREDNISOLONE 4 MG/1
TABLET ORAL
Qty: 21 KIT | Refills: 0 | Status: SHIPPED | OUTPATIENT
Start: 2024-01-11

## 2024-01-11 RX ORDER — HYALURONATE SODIUM 10 MG/ML
40 SYRINGE (ML) INTRAARTICULAR ONCE
Status: COMPLETED | OUTPATIENT
Start: 2024-01-11 | End: 2024-01-11

## 2024-01-11 RX ADMIN — Medication 40 MG: at 09:51

## 2024-01-11 NOTE — PROGRESS NOTES
Chief Complaint  Knee Pain (FU JUDSON KNEES)      followup recurrent bilateral knee pain with substantial patellofemoral arthropathy with underlying knee osteoarthritis with mild to moderate recurrent synovitis is post completion of the most recent round of viscosupplementation 3/21/2023 using Euflexxa bilateral    Enrique Pulliam is a 53 y.o. 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  He is being seen back today for follow-up on his bilateral knee symptomatic chondromalacia patella with underlying knee osteoarthritis.  He last completed his Visco supplementation on 3/21/2023 which did provide very good pain relief for him but over the last few weeks with the weather change he has become a little bit more sore primarily anteriorly involving his knees..      History of Present Illness for Follow Up Patient:      Enrique is being seen in follow up today for [facility] claim for chronic bilateral knee pain. The patient rates their current pain as a  5-6  out of 10 on the pain scale. Activities aggravating current symptoms include walking long durations and after sitting for long periods of time.  Activities relieving current symptoms include rest. Conservative treatment to date includes: rest, ice, NSAID X2 100 mg daily, knee bracing, home exercises, cortisone injections and Euflexxa most recently completed on 5/21/2019.   Denies locking catching or true instability symptoms.  He has continue with his exercise program does utilize his knee braces periodically.  He is continued to lose weight and has lost about 50 pounds.  Denies locking catching or instability symptoms.    Enrique was last seen in the office on 1/24/2023 for his bilateral knee osteoarthritis and patellofemoral arthropathy.  He has had progression of his knee pain symptoms which not range between a 4-8 out of 10.  He is currently off work for his ankle

## 2024-01-18 ENCOUNTER — OFFICE VISIT (OUTPATIENT)
Dept: ORTHOPEDIC SURGERY | Age: 54
End: 2024-01-18

## 2024-01-18 DIAGNOSIS — M25.561 CHRONIC PAIN OF BOTH KNEES: ICD-10-CM

## 2024-01-18 DIAGNOSIS — M25.562 CHRONIC PAIN OF BOTH KNEES: ICD-10-CM

## 2024-01-18 DIAGNOSIS — G89.29 CHRONIC PAIN OF BOTH KNEES: ICD-10-CM

## 2024-01-18 DIAGNOSIS — M17.0 PRIMARY OSTEOARTHRITIS OF BOTH KNEES: Primary | ICD-10-CM

## 2024-01-18 DIAGNOSIS — M22.42 CHONDROMALACIA OF BOTH PATELLAE: ICD-10-CM

## 2024-01-18 DIAGNOSIS — M22.41 CHONDROMALACIA OF BOTH PATELLAE: ICD-10-CM

## 2024-01-18 RX ORDER — HYALURONATE SODIUM 10 MG/ML
40 SYRINGE (ML) INTRAARTICULAR ONCE
Status: COMPLETED | OUTPATIENT
Start: 2024-01-18 | End: 2024-01-18

## 2024-01-18 RX ADMIN — Medication 40 MG: at 12:56

## 2024-01-18 NOTE — PROGRESS NOTES
weeks has become a little bit more sore and achy with pain in the range of 4-5 out of 10.  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  He has continued with his anti-inflammatory medications and has continued his weight loss at about 50 pounds and denies locking catching or true instability symptoms.  He does continue with his anti-inflammatories and denies locking catching or true instability symptoms.  Not be eligible for repeat viscosupplementation until August 2022.    We last saw Enrique in the office on 3/14/2023 and was continued on viscosupplementation for his underlying knee osteoarthritis with patellofemoral arthropathy.  The medications are definitely beginning to kick in and he is doing much better.  He is not having substantial pain at rest or night and is not having pain with distance walking going up and down stairs currently.  He has been continuing to work on his catching program is continue with the Celebrex.  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  Denies locking catching or true instability symptoms.  He once again has done reasonably well with viscosupplementation in the past.    We last saw Enrique in the office on 1/11/2024 and was started once again on viscosupplementation with Euflexxa for his underlying bilateral knee osteoarthritis patellofemoral arthropathy.  He states his knees are feeling a little bit better and is not having as much pain with distance walking going up and down inclines hills and stairs.  He is to continue with his exercise program and Celebrex has done a good job keeping his weight off.  He denies active locking catching or true instability symptoms or substantial night pain with this.  He typically is done well with viscosupplementation in the past.    PE: no substantial

## 2024-01-24 ENCOUNTER — OFFICE VISIT (OUTPATIENT)
Dept: ORTHOPEDIC SURGERY | Age: 54
End: 2024-01-24

## 2024-01-24 DIAGNOSIS — M17.0 PRIMARY OSTEOARTHRITIS OF BOTH KNEES: Primary | ICD-10-CM

## 2024-01-24 RX ORDER — HYALURONATE SODIUM 10 MG/ML
20 SYRINGE (ML) INTRAARTICULAR ONCE
Status: COMPLETED | OUTPATIENT
Start: 2024-01-24 | End: 2024-01-24

## 2024-01-24 RX ADMIN — Medication 20 MG: at 10:04

## 2024-01-24 RX ADMIN — Medication 20 MG: at 10:03

## 2024-01-24 NOTE — PROGRESS NOTES
joint space compartment without difficulty.  he tolerated the procedure well without difficulty.  A band-aid was applied.     POST-PROCEDURE INSTRUCTIONS GIVEN TO PATIENT:   He was advised to ice the knee for 15-20 minutes to relieve any injection site related pain. Decrease activity for the next 24 to 48 hours. May use prescription or OTC pain relievers as needed.    Work note stating that he may work up to 8 hours/day, 40 hours/week x 4 weeks was provided today.    FOLLOW-UP:   As directed or call or return to clinic if these symptoms worsen or fail to improve as anticipated. If at any time you are concerned you may contact the office for further instructions or care.                                         Joby Boo PA-C  Senior Physician Assistant  [facility]                                         Disclaimer:  This note was generated with use of a verbal recognition program (DRAGON) and an attempt was made to check for errors.  It is possible that there are still dictated errors within this office note.  If so, please bring any significant errors to my attention for an addendum.  All efforts were made to ensure that this office note is accurate.

## 2024-02-08 DIAGNOSIS — I10 BENIGN ESSENTIAL HTN: ICD-10-CM

## 2024-02-08 DIAGNOSIS — E11.9 CONTROLLED TYPE 2 DIABETES MELLITUS WITHOUT COMPLICATION, WITHOUT LONG-TERM CURRENT USE OF INSULIN (HCC): ICD-10-CM

## 2024-02-08 RX ORDER — LISINOPRIL 5 MG/1
TABLET ORAL
Qty: 90 TABLET | Refills: 0 | Status: SHIPPED | OUTPATIENT
Start: 2024-02-08

## 2024-02-08 RX ORDER — SITAGLIPTIN AND METFORMIN HYDROCHLORIDE 1000; 50 MG/1; MG/1
TABLET, FILM COATED ORAL
Qty: 180 TABLET | Refills: 0 | Status: SHIPPED | OUTPATIENT
Start: 2024-02-08

## 2024-02-08 NOTE — TELEPHONE ENCOUNTER
Last appointment: 8/10/2023  Next appointment: Visit date not found  Last refill: 6/23/2023  Sent [place] message to schedule due/overdue appointment.

## 2024-02-09 DIAGNOSIS — E11.40 TYPE 2 DIABETES MELLITUS WITH DIABETIC NEUROPATHY, WITHOUT LONG-TERM CURRENT USE OF INSULIN (HCC): ICD-10-CM

## 2024-02-09 DIAGNOSIS — E78.2 HYPERLIPIDEMIA, MIXED: ICD-10-CM

## 2024-02-09 RX ORDER — EMPAGLIFLOZIN 10 MG/1
TABLET, FILM COATED ORAL
Qty: 90 TABLET | Refills: 1 | Status: SHIPPED | OUTPATIENT
Start: 2024-02-09

## 2024-02-09 RX ORDER — ROSUVASTATIN CALCIUM 20 MG/1
20 TABLET, COATED ORAL NIGHTLY
Qty: 90 TABLET | Refills: 1 | Status: SHIPPED | OUTPATIENT
Start: 2024-02-09

## 2024-02-09 NOTE — TELEPHONE ENCOUNTER
Last appointment: 8/10/2023  Next appointment: Visit date not found  Last refill: Jardiance 12/15/2022, rosuvastatin 3/13/2023    Please call patient to schedule and then route back to the MA pool

## 2024-02-22 ENCOUNTER — OFFICE VISIT (OUTPATIENT)
Dept: INTERNAL MEDICINE CLINIC | Age: 54
End: 2024-02-22
Payer: COMMERCIAL

## 2024-02-22 VITALS
DIASTOLIC BLOOD PRESSURE: 68 MMHG | SYSTOLIC BLOOD PRESSURE: 124 MMHG | HEIGHT: 73 IN | OXYGEN SATURATION: 97 % | HEART RATE: 65 BPM | WEIGHT: 219 LBS | BODY MASS INDEX: 29.03 KG/M2

## 2024-02-22 DIAGNOSIS — E78.2 MIXED HYPERLIPIDEMIA: ICD-10-CM

## 2024-02-22 DIAGNOSIS — K75.81 NASH (NONALCOHOLIC STEATOHEPATITIS): ICD-10-CM

## 2024-02-22 DIAGNOSIS — M10.9 GOUT, UNSPECIFIED CAUSE, UNSPECIFIED CHRONICITY, UNSPECIFIED SITE: ICD-10-CM

## 2024-02-22 DIAGNOSIS — E11.40 TYPE 2 DIABETES MELLITUS WITH DIABETIC NEUROPATHY, WITHOUT LONG-TERM CURRENT USE OF INSULIN (HCC): Primary | ICD-10-CM

## 2024-02-22 DIAGNOSIS — I10 BENIGN ESSENTIAL HTN: ICD-10-CM

## 2024-02-22 DIAGNOSIS — Z86.010 HISTORY OF COLON POLYPS: ICD-10-CM

## 2024-02-22 DIAGNOSIS — E11.40 TYPE 2 DIABETES MELLITUS WITH DIABETIC NEUROPATHY, WITHOUT LONG-TERM CURRENT USE OF INSULIN (HCC): ICD-10-CM

## 2024-02-22 DIAGNOSIS — Z12.11 SCREEN FOR COLON CANCER: ICD-10-CM

## 2024-02-22 PROCEDURE — 3074F SYST BP LT 130 MM HG: CPT | Performed by: INTERNAL MEDICINE

## 2024-02-22 PROCEDURE — 3078F DIAST BP <80 MM HG: CPT | Performed by: INTERNAL MEDICINE

## 2024-02-22 PROCEDURE — 99214 OFFICE O/P EST MOD 30 MIN: CPT | Performed by: INTERNAL MEDICINE

## 2024-02-22 RX ORDER — ALLOPURINOL 100 MG/1
100 TABLET ORAL DAILY
Qty: 180 TABLET | Refills: 1 | Status: SHIPPED | OUTPATIENT
Start: 2024-02-22

## 2024-02-22 RX ORDER — INDOMETHACIN 50 MG/1
50 CAPSULE ORAL 3 TIMES DAILY PRN
Qty: 30 CAPSULE | Refills: 1 | Status: SHIPPED | OUTPATIENT
Start: 2024-02-22

## 2024-02-22 ASSESSMENT — PATIENT HEALTH QUESTIONNAIRE - PHQ9
SUM OF ALL RESPONSES TO PHQ QUESTIONS 1-9: 0
2. FEELING DOWN, DEPRESSED OR HOPELESS: 0
SUM OF ALL RESPONSES TO PHQ QUESTIONS 1-9: 0
SUM OF ALL RESPONSES TO PHQ9 QUESTIONS 1 & 2: 0
1. LITTLE INTEREST OR PLEASURE IN DOING THINGS: 0

## 2024-02-22 NOTE — PROGRESS NOTES
This note was generated completely or in part utilizing Dragon dictation speech recognition software.  Occasionally, words are mistranscribed and despite editing, the text may contain inaccuracies due to incorrect word recognition.  If further clarification is needed please contact the office at [phone]

## 2024-02-23 LAB
ALBUMIN SERPL-MCNC: 4.8 G/DL (ref 3.4–5)
ALBUMIN/GLOB SERPL: 1.6 {RATIO} (ref 1.1–2.2)
ALP SERPL-CCNC: 71 U/L (ref 40–129)
ALT SERPL-CCNC: 50 U/L (ref 10–40)
ANION GAP SERPL CALCULATED.3IONS-SCNC: 17 MMOL/L (ref 3–16)
AST SERPL-CCNC: 47 U/L (ref 15–37)
BASOPHILS # BLD: 0 K/UL (ref 0–0.2)
BASOPHILS NFR BLD: 0 %
BILIRUB SERPL-MCNC: 0.7 MG/DL (ref 0–1)
BUN SERPL-MCNC: 12 MG/DL (ref 7–20)
CALCIUM SERPL-MCNC: 9.7 MG/DL (ref 8.3–10.6)
CHLORIDE SERPL-SCNC: 97 MMOL/L (ref 99–110)
CHOLEST SERPL-MCNC: 77 MG/DL (ref 0–199)
CO2 SERPL-SCNC: 22 MMOL/L (ref 21–32)
CREAT SERPL-MCNC: 0.8 MG/DL (ref 0.9–1.3)
DEPRECATED RDW RBC AUTO: 13.5 % (ref 12.4–15.4)
EOSINOPHIL # BLD: 0 K/UL (ref 0–0.6)
EOSINOPHIL NFR BLD: 0 %
EST. AVERAGE GLUCOSE BLD GHB EST-MCNC: 171.4 MG/DL
GFR SERPLBLD CREATININE-BSD FMLA CKD-EPI: >60 ML/MIN/{1.73_M2}
GLUCOSE SERPL-MCNC: 112 MG/DL (ref 70–99)
HBA1C MFR BLD: 7.6 %
HCT VFR BLD AUTO: 48.5 % (ref 40.5–52.5)
HDLC SERPL-MCNC: 26 MG/DL (ref 40–60)
HGB BLD-MCNC: 16.5 G/DL (ref 13.5–17.5)
LDLC SERPL CALC-MCNC: 27 MG/DL
LYMPHOCYTES # BLD: 3 K/UL (ref 1–5.1)
LYMPHOCYTES NFR BLD: 27 %
MCH RBC QN AUTO: 30.8 PG (ref 26–34)
MCHC RBC AUTO-ENTMCNC: 34.1 G/DL (ref 31–36)
MCV RBC AUTO: 90.3 FL (ref 80–100)
MONOCYTES # BLD: 0.8 K/UL (ref 0–1.3)
MONOCYTES NFR BLD: 8 %
NEUTROPHILS # BLD: 6.5 K/UL (ref 1.7–7.7)
NEUTROPHILS NFR BLD: 62 %
NEUTS BAND NFR BLD MANUAL: 1 % (ref 0–7)
PLATELET # BLD AUTO: 169 K/UL (ref 135–450)
PMV BLD AUTO: 11.4 FL (ref 5–10.5)
POTASSIUM SERPL-SCNC: 4.6 MMOL/L (ref 3.5–5.1)
PROT SERPL-MCNC: 7.8 G/DL (ref 6.4–8.2)
RBC # BLD AUTO: 5.36 M/UL (ref 4.2–5.9)
RBC MORPH BLD: NORMAL
SLIDE REVIEW: ABNORMAL
SODIUM SERPL-SCNC: 136 MMOL/L (ref 136–145)
TRIGL SERPL-MCNC: 122 MG/DL (ref 0–150)
URATE SERPL-MCNC: 4.5 MG/DL (ref 3.5–7.2)
VARIANT LYMPHS NFR BLD MANUAL: 2 % (ref 0–6)
VLDLC SERPL CALC-MCNC: 24 MG/DL
WBC # BLD AUTO: 10.3 K/UL (ref 4–11)

## 2024-02-25 DIAGNOSIS — E78.2 HYPERLIPIDEMIA, MIXED: ICD-10-CM

## 2024-02-25 DIAGNOSIS — E11.40 TYPE 2 DIABETES MELLITUS WITH DIABETIC NEUROPATHY, WITHOUT LONG-TERM CURRENT USE OF INSULIN (HCC): ICD-10-CM

## 2024-02-25 RX ORDER — ROSUVASTATIN CALCIUM 20 MG/1
10 TABLET, COATED ORAL NIGHTLY
Qty: 90 TABLET | Refills: 1 | Status: SHIPPED | OUTPATIENT
Start: 2024-02-25

## 2024-05-10 DIAGNOSIS — E11.9 CONTROLLED TYPE 2 DIABETES MELLITUS WITHOUT COMPLICATION, WITHOUT LONG-TERM CURRENT USE OF INSULIN (HCC): ICD-10-CM

## 2024-05-10 DIAGNOSIS — I10 BENIGN ESSENTIAL HTN: ICD-10-CM

## 2024-05-10 RX ORDER — SITAGLIPTIN AND METFORMIN HYDROCHLORIDE 1000; 50 MG/1; MG/1
TABLET, FILM COATED ORAL
Qty: 180 TABLET | Refills: 1 | Status: SHIPPED | OUTPATIENT
Start: 2024-05-10

## 2024-05-10 RX ORDER — ALLOPURINOL 100 MG/1
100 TABLET ORAL 2 TIMES DAILY
Qty: 180 TABLET | Refills: 1 | Status: SHIPPED | OUTPATIENT
Start: 2024-05-10

## 2024-05-10 RX ORDER — LISINOPRIL 5 MG/1
TABLET ORAL
Qty: 90 TABLET | Refills: 1 | Status: SHIPPED | OUTPATIENT
Start: 2024-05-10

## 2024-05-10 NOTE — TELEPHONE ENCOUNTER
Last appointment: 2/22/2024 3 month recall.  Next appointment: Visit date not found  Last refill: 2/8/24 and 2/14/24    Patient due for ov.   Message sent to schedule

## 2024-05-30 DIAGNOSIS — R11.2 NAUSEA AND VOMITING, UNSPECIFIED VOMITING TYPE: Primary | ICD-10-CM

## 2024-05-30 RX ORDER — ONDANSETRON 4 MG/1
4 TABLET, FILM COATED ORAL EVERY 6 HOURS PRN
Qty: 30 TABLET | Refills: 0 | Status: SHIPPED | OUTPATIENT
Start: 2024-05-30

## 2024-05-31 ENCOUNTER — HOSPITAL ENCOUNTER (INPATIENT)
Age: 54
LOS: 2 days | Discharge: HOME | DRG: 872 | End: 2024-06-02
Attending: EMERGENCY MEDICINE | Admitting: INTERNAL MEDICINE
Payer: COMMERCIAL

## 2024-05-31 ENCOUNTER — APPOINTMENT (OUTPATIENT)
Dept: CT IMAGING | Age: 54
DRG: 872 | End: 2024-05-31
Payer: COMMERCIAL

## 2024-05-31 ENCOUNTER — APPOINTMENT (OUTPATIENT)
Dept: GENERAL RADIOLOGY | Age: 54
DRG: 872 | End: 2024-05-31
Payer: COMMERCIAL

## 2024-05-31 DIAGNOSIS — N17.9 ACUTE KIDNEY INJURY (HCC): ICD-10-CM

## 2024-05-31 DIAGNOSIS — E87.20 LACTIC ACIDOSIS: ICD-10-CM

## 2024-05-31 DIAGNOSIS — K52.9 ENTERITIS: Primary | ICD-10-CM

## 2024-05-31 DIAGNOSIS — A41.9 SEVERE SEPSIS (HCC): ICD-10-CM

## 2024-05-31 DIAGNOSIS — R65.20 SEVERE SEPSIS (HCC): ICD-10-CM

## 2024-05-31 LAB
ALBUMIN SERPL-MCNC: 4.2 G/DL (ref 3.4–5)
ALBUMIN/GLOB SERPL: 1.2 {RATIO} (ref 1.1–2.2)
ALP SERPL-CCNC: 87 U/L (ref 40–129)
ALT SERPL-CCNC: 22 U/L (ref 10–40)
ANION GAP SERPL CALCULATED.3IONS-SCNC: 21 MMOL/L (ref 3–16)
ANION GAP SERPL CALCULATED.3IONS-SCNC: 25 MMOL/L (ref 3–16)
AST SERPL-CCNC: 18 U/L (ref 15–37)
BACTERIA URNS QL MICRO: ABNORMAL /HPF
BASE EXCESS BLDV CALC-SCNC: -7.5 MMOL/L (ref -3–3)
BASOPHILS # BLD: 0.1 K/UL (ref 0–0.2)
BASOPHILS NFR BLD: 0.3 %
BETA-HYDROXYBUTYRATE: 1 MMOL/L (ref 0–0.27)
BILIRUB SERPL-MCNC: 0.8 MG/DL (ref 0–1)
BILIRUB UR QL STRIP.AUTO: NEGATIVE
BUN SERPL-MCNC: 25 MG/DL (ref 7–20)
BUN SERPL-MCNC: 27 MG/DL (ref 7–20)
CALCIUM SERPL-MCNC: 8.7 MG/DL (ref 8.3–10.6)
CALCIUM SERPL-MCNC: 9.5 MG/DL (ref 8.3–10.6)
CHLORIDE SERPL-SCNC: 91 MMOL/L (ref 99–110)
CHLORIDE SERPL-SCNC: 92 MMOL/L (ref 99–110)
CLARITY UR: ABNORMAL
CO2 BLDV-SCNC: 45 MMOL/L
CO2 SERPL-SCNC: 18 MMOL/L (ref 21–32)
CO2 SERPL-SCNC: 22 MMOL/L (ref 21–32)
COHGB MFR BLDV: 2.4 % (ref 0–1.5)
COLOR UR: YELLOW
CREAT SERPL-MCNC: 1.8 MG/DL (ref 0.9–1.3)
CREAT SERPL-MCNC: 1.8 MG/DL (ref 0.9–1.3)
DEPRECATED RDW RBC AUTO: 13 % (ref 12.4–15.4)
DO-HGB MFR BLDV: 21 %
EKG ATRIAL RATE: 120 BPM
EKG DIAGNOSIS: NORMAL
EKG P AXIS: 78 DEGREES
EKG P-R INTERVAL: 126 MS
EKG Q-T INTERVAL: 318 MS
EKG QRS DURATION: 74 MS
EKG QTC CALCULATION (BAZETT): 449 MS
EKG R AXIS: 62 DEGREES
EKG T AXIS: 57 DEGREES
EKG VENTRICULAR RATE: 120 BPM
EOSINOPHIL # BLD: 0.3 K/UL (ref 0–0.6)
EOSINOPHIL NFR BLD: 1.3 %
EPI CELLS #/AREA URNS AUTO: 2 /HPF (ref 0–5)
FLUAV RNA RESP QL NAA+PROBE: NOT DETECTED
FLUBV RNA RESP QL NAA+PROBE: NOT DETECTED
GFR SERPLBLD CREATININE-BSD FMLA CKD-EPI: 44 ML/MIN/{1.73_M2}
GFR SERPLBLD CREATININE-BSD FMLA CKD-EPI: 44 ML/MIN/{1.73_M2}
GLUCOSE BLD-MCNC: 311 MG/DL (ref 70–99)
GLUCOSE SERPL-MCNC: 309 MG/DL (ref 70–99)
GLUCOSE SERPL-MCNC: 343 MG/DL (ref 70–99)
GLUCOSE UR STRIP.AUTO-MCNC: >=1000 MG/DL
HCO3 BLDV-SCNC: 18.8 MMOL/L (ref 23–29)
HCT VFR BLD AUTO: 61.2 % (ref 40.5–52.5)
HGB BLD-MCNC: 20.2 G/DL (ref 13.5–17.5)
HGB UR QL STRIP.AUTO: NEGATIVE
HYALINE CASTS #/AREA URNS AUTO: 41 /LPF (ref 0–8)
KETONES UR STRIP.AUTO-MCNC: NEGATIVE MG/DL
LACTATE BLDV-SCNC: 5.9 MMOL/L (ref 0.4–1.9)
LACTATE BLDV-SCNC: 5.9 MMOL/L (ref 0.4–2)
LACTATE BLDV-SCNC: 6.4 MMOL/L (ref 0.4–1.9)
LEUKOCYTE ESTERASE UR QL STRIP.AUTO: NEGATIVE
LIPASE SERPL-CCNC: 114 U/L (ref 13–60)
LYMPHOCYTES # BLD: 1.3 K/UL (ref 1–5.1)
LYMPHOCYTES NFR BLD: 5.4 %
MAGNESIUM SERPL-MCNC: 1.6 MG/DL (ref 1.8–2.4)
MCH RBC QN AUTO: 29.8 PG (ref 26–34)
MCHC RBC AUTO-ENTMCNC: 33 G/DL (ref 31–36)
MCV RBC AUTO: 90.4 FL (ref 80–100)
METHGB MFR BLDV: 0.3 %
MONOCYTES # BLD: 1.1 K/UL (ref 0–1.3)
MONOCYTES NFR BLD: 4.5 %
NEUTROPHILS # BLD: 21.4 K/UL (ref 1.7–7.7)
NEUTROPHILS NFR BLD: 88.5 %
NITRITE UR QL STRIP.AUTO: NEGATIVE
NT-PROBNP SERPL-MCNC: 352 PG/ML (ref 0–124)
O2 CT VFR BLDV CALC: 23 VOL %
O2 THERAPY: ABNORMAL
PATH INTERP BLD-IMP: YES
PCO2 BLDV: 40.4 MMHG (ref 40–50)
PERFORMED ON: ABNORMAL
PH BLDV: 7.28 [PH] (ref 7.35–7.45)
PH UR STRIP.AUTO: 5.5 [PH] (ref 5–8)
PLATELET # BLD AUTO: 269 K/UL (ref 135–450)
PLATELET BLD QL SMEAR: ADEQUATE
PMV BLD AUTO: 10.6 FL (ref 5–10.5)
PO2 BLDV: 47.2 MMHG (ref 25–40)
POTASSIUM SERPL-SCNC: 4.3 MMOL/L (ref 3.5–5.1)
POTASSIUM SERPL-SCNC: 4.4 MMOL/L (ref 3.5–5.1)
PROCALCITONIN SERPL IA-MCNC: 2.38 NG/ML (ref 0–0.15)
PROT SERPL-MCNC: 7.8 G/DL (ref 6.4–8.2)
PROT UR STRIP.AUTO-MCNC: 30 MG/DL
RBC # BLD AUTO: 6.77 M/UL (ref 4.2–5.9)
RBC CLUMPS #/AREA URNS AUTO: 1 /HPF (ref 0–4)
REASON FOR REJECTION: NORMAL
REJECTED TEST: NORMAL
SAO2 % BLDV: 78 %
SARS-COV-2 RNA RESP QL NAA+PROBE: NOT DETECTED
SLIDE REVIEW: ABNORMAL
SODIUM SERPL-SCNC: 134 MMOL/L (ref 136–145)
SODIUM SERPL-SCNC: 135 MMOL/L (ref 136–145)
SP GR UR STRIP.AUTO: >=1.03 (ref 1–1.03)
TROPONIN, HIGH SENSITIVITY: 28 NG/L (ref 0–22)
UA COMPLETE W REFLEX CULTURE PNL UR: ABNORMAL
UA DIPSTICK W REFLEX MICRO PNL UR: YES
URN SPEC COLLECT METH UR: ABNORMAL
UROBILINOGEN UR STRIP-ACNC: 0.2 E.U./DL
WBC # BLD AUTO: 24.1 K/UL (ref 4–11)
WBC #/AREA URNS AUTO: 1 /HPF (ref 0–5)

## 2024-05-31 PROCEDURE — 96367 TX/PROPH/DG ADDL SEQ IV INF: CPT

## 2024-05-31 PROCEDURE — 6360000004 HC RX CONTRAST MEDICATION: Performed by: EMERGENCY MEDICINE

## 2024-05-31 PROCEDURE — 6360000002 HC RX W HCPCS: Performed by: PHYSICIAN ASSISTANT

## 2024-05-31 PROCEDURE — 96365 THER/PROPH/DIAG IV INF INIT: CPT

## 2024-05-31 PROCEDURE — 74174 CTA ABD&PLVS W/CONTRAST: CPT

## 2024-05-31 PROCEDURE — 36415 COLL VENOUS BLD VENIPUNCTURE: CPT

## 2024-05-31 PROCEDURE — 82803 BLOOD GASES ANY COMBINATION: CPT

## 2024-05-31 PROCEDURE — 83880 ASSAY OF NATRIURETIC PEPTIDE: CPT

## 2024-05-31 PROCEDURE — 84484 ASSAY OF TROPONIN QUANT: CPT

## 2024-05-31 PROCEDURE — 2060000000 HC ICU INTERMEDIATE R&B

## 2024-05-31 PROCEDURE — 71045 X-RAY EXAM CHEST 1 VIEW: CPT

## 2024-05-31 PROCEDURE — 96375 TX/PRO/DX INJ NEW DRUG ADDON: CPT

## 2024-05-31 PROCEDURE — 96361 HYDRATE IV INFUSION ADD-ON: CPT

## 2024-05-31 PROCEDURE — 87636 SARSCOV2 & INF A&B AMP PRB: CPT

## 2024-05-31 PROCEDURE — 6360000002 HC RX W HCPCS: Performed by: EMERGENCY MEDICINE

## 2024-05-31 PROCEDURE — 87040 BLOOD CULTURE FOR BACTERIA: CPT

## 2024-05-31 PROCEDURE — 84145 PROCALCITONIN (PCT): CPT

## 2024-05-31 PROCEDURE — 83735 ASSAY OF MAGNESIUM: CPT

## 2024-05-31 PROCEDURE — 85025 COMPLETE CBC W/AUTO DIFF WBC: CPT

## 2024-05-31 PROCEDURE — 93010 ELECTROCARDIOGRAM REPORT: CPT | Performed by: INTERNAL MEDICINE

## 2024-05-31 PROCEDURE — 81001 URINALYSIS AUTO W/SCOPE: CPT

## 2024-05-31 PROCEDURE — 2580000003 HC RX 258: Performed by: PHYSICIAN ASSISTANT

## 2024-05-31 PROCEDURE — 83605 ASSAY OF LACTIC ACID: CPT

## 2024-05-31 PROCEDURE — 99285 EMERGENCY DEPT VISIT HI MDM: CPT

## 2024-05-31 PROCEDURE — 74176 CT ABD & PELVIS W/O CONTRAST: CPT

## 2024-05-31 PROCEDURE — 80053 COMPREHEN METABOLIC PANEL: CPT

## 2024-05-31 PROCEDURE — 83690 ASSAY OF LIPASE: CPT

## 2024-05-31 PROCEDURE — 2580000003 HC RX 258: Performed by: EMERGENCY MEDICINE

## 2024-05-31 PROCEDURE — 93005 ELECTROCARDIOGRAM TRACING: CPT | Performed by: EMERGENCY MEDICINE

## 2024-05-31 PROCEDURE — 96366 THER/PROPH/DIAG IV INF ADDON: CPT

## 2024-05-31 PROCEDURE — 82010 KETONE BODYS QUAN: CPT

## 2024-05-31 RX ORDER — SODIUM CHLORIDE 0.9 % (FLUSH) 0.9 %
5-40 SYRINGE (ML) INJECTION PRN
Status: DISCONTINUED | OUTPATIENT
Start: 2024-05-31 | End: 2024-06-02 | Stop reason: HOSPADM

## 2024-05-31 RX ORDER — LOPERAMIDE HYDROCHLORIDE 2 MG/1
2 CAPSULE ORAL 4 TIMES DAILY PRN
COMMUNITY

## 2024-05-31 RX ORDER — 0.9 % SODIUM CHLORIDE 0.9 %
1979 INTRAVENOUS SOLUTION INTRAVENOUS ONCE
Status: COMPLETED | OUTPATIENT
Start: 2024-05-31 | End: 2024-05-31

## 2024-05-31 RX ORDER — DIPHENHYDRAMINE HYDROCHLORIDE 50 MG/ML
50 INJECTION INTRAMUSCULAR; INTRAVENOUS ONCE
Status: COMPLETED | OUTPATIENT
Start: 2024-05-31 | End: 2024-05-31

## 2024-05-31 RX ORDER — 0.9 % SODIUM CHLORIDE 0.9 %
1000 INTRAVENOUS SOLUTION INTRAVENOUS ONCE
Status: COMPLETED | OUTPATIENT
Start: 2024-05-31 | End: 2024-05-31

## 2024-05-31 RX ORDER — ACETAMINOPHEN 325 MG/1
650 TABLET ORAL EVERY 6 HOURS PRN
Status: DISCONTINUED | OUTPATIENT
Start: 2024-05-31 | End: 2024-06-02 | Stop reason: HOSPADM

## 2024-05-31 RX ORDER — INSULIN LISPRO 100 [IU]/ML
0-4 INJECTION, SOLUTION INTRAVENOUS; SUBCUTANEOUS NIGHTLY
Status: DISCONTINUED | OUTPATIENT
Start: 2024-05-31 | End: 2024-06-02 | Stop reason: HOSPADM

## 2024-05-31 RX ORDER — SODIUM CHLORIDE, SODIUM LACTATE, POTASSIUM CHLORIDE, CALCIUM CHLORIDE 600; 310; 30; 20 MG/100ML; MG/100ML; MG/100ML; MG/100ML
INJECTION, SOLUTION INTRAVENOUS CONTINUOUS
Status: DISCONTINUED | OUTPATIENT
Start: 2024-05-31 | End: 2024-05-31 | Stop reason: HOSPADM

## 2024-05-31 RX ORDER — ENOXAPARIN SODIUM 100 MG/ML
40 INJECTION SUBCUTANEOUS DAILY
Status: DISCONTINUED | OUTPATIENT
Start: 2024-06-01 | End: 2024-06-02 | Stop reason: HOSPADM

## 2024-05-31 RX ORDER — ONDANSETRON 2 MG/ML
4 INJECTION INTRAMUSCULAR; INTRAVENOUS EVERY 30 MIN PRN
Status: DISCONTINUED | OUTPATIENT
Start: 2024-05-31 | End: 2024-05-31 | Stop reason: HOSPADM

## 2024-05-31 RX ORDER — SODIUM CHLORIDE 9 MG/ML
INJECTION, SOLUTION INTRAVENOUS PRN
Status: DISCONTINUED | OUTPATIENT
Start: 2024-05-31 | End: 2024-06-02 | Stop reason: HOSPADM

## 2024-05-31 RX ORDER — ROSUVASTATIN CALCIUM 10 MG/1
10 TABLET, COATED ORAL NIGHTLY
Status: DISCONTINUED | OUTPATIENT
Start: 2024-05-31 | End: 2024-06-02 | Stop reason: HOSPADM

## 2024-05-31 RX ORDER — INSULIN LISPRO 100 [IU]/ML
0-8 INJECTION, SOLUTION INTRAVENOUS; SUBCUTANEOUS
Status: DISCONTINUED | OUTPATIENT
Start: 2024-06-01 | End: 2024-06-02 | Stop reason: HOSPADM

## 2024-05-31 RX ORDER — MAGNESIUM SULFATE IN WATER 40 MG/ML
2000 INJECTION, SOLUTION INTRAVENOUS ONCE
Status: COMPLETED | OUTPATIENT
Start: 2024-05-31 | End: 2024-05-31

## 2024-05-31 RX ORDER — ACETAMINOPHEN 650 MG/1
650 SUPPOSITORY RECTAL EVERY 6 HOURS PRN
Status: DISCONTINUED | OUTPATIENT
Start: 2024-05-31 | End: 2024-06-02 | Stop reason: HOSPADM

## 2024-05-31 RX ORDER — MORPHINE SULFATE 4 MG/ML
4 INJECTION, SOLUTION INTRAMUSCULAR; INTRAVENOUS EVERY 30 MIN PRN
Status: DISCONTINUED | OUTPATIENT
Start: 2024-05-31 | End: 2024-05-31 | Stop reason: HOSPADM

## 2024-05-31 RX ORDER — SODIUM CHLORIDE 0.9 % (FLUSH) 0.9 %
5-40 SYRINGE (ML) INJECTION EVERY 12 HOURS SCHEDULED
Status: DISCONTINUED | OUTPATIENT
Start: 2024-05-31 | End: 2024-06-02 | Stop reason: HOSPADM

## 2024-05-31 RX ADMIN — SODIUM CHLORIDE 2000 ML: 9 INJECTION, SOLUTION INTRAVENOUS at 16:39

## 2024-05-31 RX ADMIN — SODIUM CHLORIDE, POTASSIUM CHLORIDE, SODIUM LACTATE AND CALCIUM CHLORIDE: 600; 310; 30; 20 INJECTION, SOLUTION INTRAVENOUS at 19:01

## 2024-05-31 RX ADMIN — SODIUM CHLORIDE 1000 ML: 9 INJECTION, SOLUTION INTRAVENOUS at 16:24

## 2024-05-31 RX ADMIN — PIPERACILLIN AND TAZOBACTAM 4500 MG: 4; .5 INJECTION, POWDER, FOR SOLUTION INTRAVENOUS at 17:39

## 2024-05-31 RX ADMIN — IOPAMIDOL 75 ML: 755 INJECTION, SOLUTION INTRAVENOUS at 19:30

## 2024-05-31 RX ADMIN — DIPHENHYDRAMINE HYDROCHLORIDE 50 MG: 50 INJECTION INTRAMUSCULAR; INTRAVENOUS at 17:02

## 2024-05-31 RX ADMIN — MAGNESIUM SULFATE HEPTAHYDRATE 2000 MG: 40 INJECTION, SOLUTION INTRAVENOUS at 18:41

## 2024-05-31 RX ADMIN — ONDANSETRON 4 MG: 2 INJECTION INTRAMUSCULAR; INTRAVENOUS at 16:21

## 2024-05-31 RX ADMIN — MORPHINE SULFATE 4 MG: 4 INJECTION, SOLUTION INTRAMUSCULAR; INTRAVENOUS at 16:22

## 2024-05-31 ASSESSMENT — PAIN DESCRIPTION - LOCATION
LOCATION: ABDOMEN
LOCATION: ABDOMEN

## 2024-05-31 ASSESSMENT — PAIN SCALES - GENERAL
PAINLEVEL_OUTOF10: 2
PAINLEVEL_OUTOF10: 0
PAINLEVEL_OUTOF10: 2
PAINLEVEL_OUTOF10: 9

## 2024-05-31 ASSESSMENT — PAIN - FUNCTIONAL ASSESSMENT
PAIN_FUNCTIONAL_ASSESSMENT: 0-10
PAIN_FUNCTIONAL_ASSESSMENT: 0-10

## 2024-05-31 ASSESSMENT — PAIN DESCRIPTION - ORIENTATION: ORIENTATION: RIGHT

## 2024-05-31 ASSESSMENT — PAIN DESCRIPTION - DESCRIPTORS: DESCRIPTORS: ACHING

## 2024-05-31 ASSESSMENT — PAIN DESCRIPTION - PAIN TYPE: TYPE: ACUTE PAIN

## 2024-05-31 NOTE — PROGRESS NOTES
Pharmacy Home Medication Reconciliation Note    A medication reconciliation has been completed for Enrique Pulliam 1970    Pharmacy: [facility] [address], [place], [place]  Information provided by: patient    The patient's home medication list is as follows:  No current facility-administered medications on file prior to encounter.     Current Outpatient Medications on File Prior to Encounter   Medication Sig Dispense Refill    loperamide (IMODIUM) 2 MG capsule Take 1 capsule by mouth 4 times daily as needed for Diarrhea      ondansetron (ZOFRAN) 4 MG tablet Take 1 tablet by mouth every 6 hours as needed for Nausea or Vomiting 30 tablet 0    lisinopril (PRINIVIL;ZESTRIL) 5 MG tablet TAKE 1 TABLET BY MOUTH EVERY DAY (Patient taking differently: Take 1 tablet by mouth nightly TAKE 1 TABLET BY MOUTH EVERY DAY) 90 tablet 1    JANUMET  MG per tablet TAKE 1 TABLET BY MOUTH TWICE A DAY WITH FOOD (Patient taking differently: Take 1 tablet by mouth 2 times daily (with meals) TAKE 1 TABLET BY MOUTH TWICE A DAY WITH FOOD) 180 tablet 1    allopurinol (ZYLOPRIM) 100 MG tablet TAKE 1 TABLET BY MOUTH TWICE A  tablet 1    rosuvastatin (CRESTOR) 20 MG tablet Take 0.5 tablets by mouth nightly 90 tablet 1    empagliflozin (JARDIANCE) 25 MG tablet Take 1 tablet by mouth daily 90 tablet 1    indomethacin (INDOCIN) 50 MG capsule Take 1 capsule by mouth 3 times daily as needed (gout attack) Take as needed for gout 30 capsule 1    celecoxib (CELEBREX) 200 MG capsule Take 1 capsule by mouth daily 30 capsule 3    blood glucose test strips (GLUCOMETER ELITE TEST STRIPS) strip Use one tid to check blood sugars.  Patient uses ONE TOUCH STRIP 200 each 6    ONE TOUCH ULTRA TEST strip TEST BLOOD SUGAR TWICE A  strip 1    aspirin EC 81 MG EC tablet Take 1 tablet by mouth daily 30 tablet 11       Of note, patient took all AM meds prior to ED arrival.    Timing of last doses updated.    Thank you,  Leticia Reddy,

## 2024-06-01 LAB
ALBUMIN SERPL-MCNC: 3.2 G/DL (ref 3.4–5)
ALBUMIN/GLOB SERPL: 1.3 {RATIO} (ref 1.1–2.2)
ALP SERPL-CCNC: 50 U/L (ref 40–129)
ALT SERPL-CCNC: 13 U/L (ref 10–40)
ANION GAP SERPL CALCULATED.3IONS-SCNC: 14 MMOL/L (ref 3–16)
AST SERPL-CCNC: 12 U/L (ref 15–37)
BASOPHILS # BLD: 0 K/UL (ref 0–0.2)
BASOPHILS NFR BLD: 0.2 %
BILIRUB SERPL-MCNC: 0.5 MG/DL (ref 0–1)
BUN SERPL-MCNC: 19 MG/DL (ref 7–20)
CALCIUM SERPL-MCNC: 7.8 MG/DL (ref 8.3–10.6)
CHLORIDE SERPL-SCNC: 100 MMOL/L (ref 99–110)
CO2 SERPL-SCNC: 18 MMOL/L (ref 21–32)
CREAT SERPL-MCNC: 1 MG/DL (ref 0.9–1.3)
DEPRECATED RDW RBC AUTO: 12.9 % (ref 12.4–15.4)
EOSINOPHIL # BLD: 0.6 K/UL (ref 0–0.6)
EOSINOPHIL NFR BLD: 3.3 %
GFR SERPLBLD CREATININE-BSD FMLA CKD-EPI: 88 ML/MIN/{1.73_M2}
GLUCOSE BLD-MCNC: 128 MG/DL (ref 70–99)
GLUCOSE BLD-MCNC: 131 MG/DL (ref 70–99)
GLUCOSE BLD-MCNC: 136 MG/DL (ref 70–99)
GLUCOSE BLD-MCNC: 163 MG/DL (ref 70–99)
GLUCOSE BLD-MCNC: 90 MG/DL (ref 70–99)
GLUCOSE SERPL-MCNC: 164 MG/DL (ref 70–99)
HCT VFR BLD AUTO: 46.1 % (ref 40.5–52.5)
HGB BLD-MCNC: 15.5 G/DL (ref 13.5–17.5)
LACTATE BLDV-SCNC: 1.9 MMOL/L (ref 0.4–2)
LACTATE BLDV-SCNC: 2.4 MMOL/L (ref 0.4–1.9)
LACTATE BLDV-SCNC: 3.2 MMOL/L (ref 0.4–1.9)
LYMPHOCYTES # BLD: 2.7 K/UL (ref 1–5.1)
LYMPHOCYTES NFR BLD: 14.2 %
MCH RBC QN AUTO: 30.1 PG (ref 26–34)
MCHC RBC AUTO-ENTMCNC: 33.5 G/DL (ref 31–36)
MCV RBC AUTO: 89.9 FL (ref 80–100)
MONOCYTES # BLD: 1.4 K/UL (ref 0–1.3)
MONOCYTES NFR BLD: 7.2 %
NEUTROPHILS # BLD: 14.4 K/UL (ref 1.7–7.7)
NEUTROPHILS NFR BLD: 75.1 %
PERFORMED ON: ABNORMAL
PERFORMED ON: NORMAL
PLATELET # BLD AUTO: 210 K/UL (ref 135–450)
PMV BLD AUTO: 10 FL (ref 5–10.5)
POTASSIUM SERPL-SCNC: 4.1 MMOL/L (ref 3.5–5.1)
PROT SERPL-MCNC: 5.6 G/DL (ref 6.4–8.2)
RBC # BLD AUTO: 5.13 M/UL (ref 4.2–5.9)
SODIUM SERPL-SCNC: 132 MMOL/L (ref 136–145)
TROPONIN, HIGH SENSITIVITY: 22 NG/L (ref 0–22)
TROPONIN, HIGH SENSITIVITY: 23 NG/L (ref 0–22)
TROPONIN, HIGH SENSITIVITY: 26 NG/L (ref 0–22)
WBC # BLD AUTO: 19.1 K/UL (ref 4–11)

## 2024-06-01 PROCEDURE — 80053 COMPREHEN METABOLIC PANEL: CPT

## 2024-06-01 PROCEDURE — 85025 COMPLETE CBC W/AUTO DIFF WBC: CPT

## 2024-06-01 PROCEDURE — 6360000002 HC RX W HCPCS: Performed by: INTERNAL MEDICINE

## 2024-06-01 PROCEDURE — 84484 ASSAY OF TROPONIN QUANT: CPT

## 2024-06-01 PROCEDURE — 6370000000 HC RX 637 (ALT 250 FOR IP): Performed by: INTERNAL MEDICINE

## 2024-06-01 PROCEDURE — 2580000003 HC RX 258: Performed by: NURSE PRACTITIONER

## 2024-06-01 PROCEDURE — 36415 COLL VENOUS BLD VENIPUNCTURE: CPT

## 2024-06-01 PROCEDURE — 2060000000 HC ICU INTERMEDIATE R&B

## 2024-06-01 PROCEDURE — 99222 1ST HOSP IP/OBS MODERATE 55: CPT | Performed by: SURGERY

## 2024-06-01 PROCEDURE — 2580000003 HC RX 258: Performed by: INTERNAL MEDICINE

## 2024-06-01 PROCEDURE — 83605 ASSAY OF LACTIC ACID: CPT

## 2024-06-01 RX ORDER — SODIUM CHLORIDE 9 MG/ML
INJECTION, SOLUTION INTRAVENOUS CONTINUOUS
Status: DISCONTINUED | OUTPATIENT
Start: 2024-06-01 | End: 2024-06-02 | Stop reason: HOSPADM

## 2024-06-01 RX ADMIN — PIPERACILLIN AND TAZOBACTAM 3375 MG: 3; .375 INJECTION, POWDER, LYOPHILIZED, FOR SOLUTION INTRAVENOUS at 10:48

## 2024-06-01 RX ADMIN — ROSUVASTATIN CALCIUM 10 MG: 10 TABLET, FILM COATED ORAL at 20:28

## 2024-06-01 RX ADMIN — SODIUM CHLORIDE: 9 INJECTION, SOLUTION INTRAVENOUS at 10:47

## 2024-06-01 RX ADMIN — ROSUVASTATIN CALCIUM 10 MG: 10 TABLET, FILM COATED ORAL at 00:39

## 2024-06-01 RX ADMIN — SODIUM CHLORIDE: 9 INJECTION, SOLUTION INTRAVENOUS at 00:51

## 2024-06-01 RX ADMIN — SODIUM CHLORIDE: 9 INJECTION, SOLUTION INTRAVENOUS at 08:16

## 2024-06-01 RX ADMIN — SODIUM CHLORIDE: 9 INJECTION, SOLUTION INTRAVENOUS at 17:06

## 2024-06-01 RX ADMIN — ENOXAPARIN SODIUM 40 MG: 100 INJECTION SUBCUTANEOUS at 10:49

## 2024-06-01 RX ADMIN — PIPERACILLIN AND TAZOBACTAM 3375 MG: 3; .375 INJECTION, POWDER, LYOPHILIZED, FOR SOLUTION INTRAVENOUS at 00:56

## 2024-06-01 RX ADMIN — SODIUM CHLORIDE, PRESERVATIVE FREE 10 ML: 5 INJECTION INTRAVENOUS at 00:39

## 2024-06-01 RX ADMIN — PIPERACILLIN AND TAZOBACTAM 3375 MG: 3; .375 INJECTION, POWDER, LYOPHILIZED, FOR SOLUTION INTRAVENOUS at 18:20

## 2024-06-01 NOTE — CONSULTS
[facility] GENERAL AND LAPAROSCOPIC SURGERY                       PATIENT NAME: Enrique Pulliam     ADMISSION DATE: 5/31/2024  2:45 PM      TODAY'S DATE: 6/1/2024    Reason for Consult:  N/V    Requesting Physician:  FRANCHESCA Ellington    HISTORY OF PRESENT ILLNESS:              The patient is a 54 y.o. male who presents with N/V/D. Had illness, 5 - 6 days. Pt has had poor oral intake. No bleeding noted, no prior abd / intestinal surgery or chronic GI disorders. Pt with improvement with meds in ER. No emesis now. Feels more comfortable, denies pain at this time.    Past Medical History:        Diagnosis Date    Chronic idiopathic gout involving toe of left foot without tophus 10/10/2016    Depression     Hyperlipidemia     Hypertension     IBS (irritable bowel syndrome)     KOEHLER (nonalcoholic steatohepatitis) 10/17/2019    Osteoarthritis     Type 2 diabetes mellitus without complication, without long-term current use of insulin (HCC) 10/10/2016    Vitamin D deficiency        Past Surgical History:        Procedure Laterality Date    COLONOSCOPY  06/21/2017    Dr. Luciano    TONSILLECTOMY      UPPER GASTROINTESTINAL ENDOSCOPY  06/21/2017    Dr. Luciano    WISDOM TOOTH EXTRACTION         Current Medications:   Current Facility-Administered Medications: 0.9 % sodium chloride infusion, , IntraVENous, Continuous  rosuvastatin (CRESTOR) tablet 10 mg, 10 mg, Oral, Nightly  sodium chloride flush 0.9 % injection 5-40 mL, 5-40 mL, IntraVENous, 2 times per day  sodium chloride flush 0.9 % injection 5-40 mL, 5-40 mL, IntraVENous, PRN  0.9 % sodium chloride infusion, , IntraVENous, PRN  enoxaparin (LOVENOX) injection 40 mg, 40 mg, SubCUTAneous, Daily  acetaminophen (TYLENOL) tablet 650 mg, 650 mg, Oral, Q6H PRN **OR** acetaminophen (TYLENOL) suppository 650 mg, 650 mg, Rectal, Q6H PRN  piperacillin-tazobactam (ZOSYN) 3,375 mg in sodium chloride 0.9 % 50 mL IVPB (mini-bag), 3,375 mg, IntraVENous, Q8H  insulin lispro  mesenteric lymphadenopathy is seen. Bones/Soft Tissues: No acute bony abnormalities are noted. There are shotty inguinal lymph nodes noted. VASCULAR The abdominal aorta is not aneurysmal.  No dissection is seen.  No aortic stenosis is noted.  The common iliac, external iliac and common femoral arteries demonstrate no significant disease or stenosis.  There are paired renal arteries bilaterally with no renal artery stenosis.  The celiac, superior mesenteric and inferior mesenteric arteries are all patent without stenosis.     1. No evidence for acute or chronic mesenteric ischemia. 2. Hepatic steatosis. 3. Fluid-filled nondilated loops of small bowel with liquid stool noted within the right colon. This is nonspecific but can be seen in the setting of enteritis.     XR CHEST PORTABLE    Result Date: 5/31/2024  EXAMINATION: ONE XRAY VIEW OF THE CHEST 5/31/2024 3:25 pm COMPARISON: Chest radiograph dated 04/04/2017 HISTORY: ORDERING SYSTEM PROVIDED HISTORY: Shortness of Breath TECHNOLOGIST PROVIDED HISTORY: Reason for exam:->Shortness of Breath Reason for Exam: Emesis FINDINGS: Medical devices: None. Mediastinum/Heart: The mediastinal contours are unchanged compared to prior exam. Lungs: The lungs are clear. Pleura: No findings to suggest pneumothorax or large pleural effusion. Bones/Soft tissues: Nothing acute.     Negative portable chest.       IMPRESSION/RECOMMENDATIONS:    Acute GI illness, dehydration with ileus  Improved with inpt care, IVF, antibiotics  CTA with intact bowel perfusion  No surgery needed  Continue supportive care, trial diet ordered, would go a little slower    Fran Brice MD

## 2024-06-01 NOTE — H&P
Hospital Medicine History & Physical      Patient Name: Enrique Pulliam    : 1970    PCP: Anibal Murcia DO    Date of Service:  Patient seen and examined on 24     Chief Complaint: Abdominal pain    History Of Present Illness:    Enrique Pulliam is a 54 y.o. male with a PMH of hypertension, hyperlipidemia, type 2 diabetes who presented to ED with complaint of abdominal pain.    Patient endorses that over the last week he has had abdominal pain with associated chills, vomiting and loose stools.  States that he usually has episodes of the symptoms once every year but this time it has persisted.  Felt better the next day but 2 days ago worsening of symptoms with poor oral intake, generalized weakness with intermittent shortness of breath.  States he has noticed some blood in his urine but denies any dysuria or bloody stools.    Vitals show blood pressure 126/79 pulse of 79 temperature 97.9 respiration 23 saturating 94% on room air.  Labs show sodium of 135 chloride of 92 BUN of 27 creatinine of 1.8.  Anion gap of 21.  Lactate of 6.4/5.9.  Glucose of 309.  WBC of 24.1 hemoglobin of 20.2 hematuria of 61.2..  Influenza A/B/COVID-negative.  VBG shows pH of 7.277, pCO2 of 40.4 pO2 of 47.2 bicarb of 18.8.  Urinalysis not indicated for UTI    Chest imaging shows no acute cardiopulmonary process CT abdomen and pelvis shows no evidence of acute or chronic mesenteric ischemia, hepatic steatosis, fluid-filled nondilated loops of small bowel with liquid stool noted within the right colon concern for enteritis, no acute diverticulitis with mild diverticulosis.  In the ED patient was initiated on sepsis protocol received 3 L bolus NS 0.9%, magnesium 2 g, Benadryl 50 mg, Zosyn 3.375 g.  Initiated on  cc/h..  Blood cultures x 2.  General surgery consulted    Past Medical History:    Patient has a past medical history of Chronic idiopathic gout involving toe of left foot without tophus, Depression,

## 2024-06-01 NOTE — ED PROVIDER NOTES
In addition to the advanced practice provider, I personally saw Enrique Pulliam and performed a substantive portion of the visit including all aspects of the medical decision making. I made/approved the management plan and take responsibility for the patient management    Briefly, this is a 54 y.o. male here for generalized abdominal pain.  Associated with nausea, vomiting and diarrhea.  Has also had chills but no fevers.  Reports vomitus and diarrhea are nonbloody.  History of diabetes.  Patient noted to be hypotensive on arrival to the emergency department.    On exam, patient afebrile, however ill-appearing. Appears uncomfortable however no antonio painful or respiratory distress. Heart tachycardic, regular rhythm. Lungs CTAB. Abdomen soft, nondistended, tender to palpation in right hemiabdomen.  No focal right lower quadrant tenderness, negative Rovsing.  No rebound, no rigidity, no guarding.  Mucous membranes parched, lips chapped.      EKG  EKG was reviewed by emergency department physician in the absence of a cardiologist    Narrow complex sinus rhythm, rate 120, normal axis, normal KS and QRS intervals, normal Qtc, no specific ST elevations or depressions, normal t-wave morphology, impression sinus tachycardia, no STEMI, interpretation mildly limited by baseline motion artifact      Screenings   [place] Coma Scale  Eye Opening: Spontaneous  Best Verbal Response: Oriented  Best Motor Response: Obeys commands  [place] Coma Scale Score: 15          MDM    Patient afebrile however ill-appearing on arrival to the emergency department.  He is in no antonio painful or respiratory distress, alert and protecting his airway.  Noted to be hypotensive and clinically appears volume depleted.  EKG without evidence of acute ischemia, troponin is minimally elevated, no chest pain, overall low suspicion for ACS and suspect troponin due to metabolic derangement and hypotension.  No malignant dysrhythmia.  No peritoneal signs on 
  Time Septic Shock Identified: [id number]    Fluid Resuscitation Rational: at least 30mL/kg based on entered actual weight at time of triage      Repeat lactate level: ordered and pending at this time    Reassessment Exam:   Patient still has 2 L of IV fluids to go at the end my shift.  Repeat tissue exam will be performed by attending physician    Chronic Conditions affecting care:    has a past medical history of Chronic idiopathic gout involving toe of left foot without tophus (10/10/2016), Depression, Hyperlipidemia, Hypertension, IBS (irritable bowel syndrome), KOEHLER (nonalcoholic steatohepatitis) (10/17/2019), Osteoarthritis, Type 2 diabetes mellitus without complication, without long-term current use of insulin (HCC) (10/10/2016), and Vitamin D deficiency.    CONSULTS: (Who and What was discussed)  None      Social Determinants Significantly Affecting Health : None    Records Reviewed (External and Source)     CC/HPI Summary, DDx, ED Course, and Reassessment: Patient presented with some abdominal pain with nausea vomiting and diarrhea.  Patient was tachycardic and hypotensive on presentation.  Point-of-care glucose was 311.  Has JOSE ROBERTO with a creatinine of 1.8, BUN of 25.  Does have an anion gap of 25 and a CO2 of 18.  Had some tenderness in the abdomen worse in the right upper quadrant with an elevated lipase of 114.  CT imaging without contrast obtained and was started on Zosyn due to concern for septic shock from possible acute cholecystitis or intra-abdominal etiology.  His lactic acid of 5.9.  Magnesium is low at 1.6 and was started on some IV magnesium.  Procalcitonin is elevated at 2.38.  Troponin is elevated at 28.  He has no chest pain but has been having some intermittent shortness of breath.  Suspect this is most likely related to his sepsis and JOSE ROBERTO and not acute coronary syndrome.  Beta hydroxybutyrate is only minimally elevated at 1.00.  VBG with mild acidosis with a pH of 7.277.  Was started on full 30

## 2024-06-01 NOTE — PROGRESS NOTES
V2.0    [facility] [place]      Name:  Enrique Pulliam /Age/Sex: 1970  (54 y.o. male)   MRN & CSN:  [id number] & [id number] Encounter Date/Time: 2024 7:02 PM EDT   Location:  [address] [place], [place] [id number]- PCP: Anibal Murcia DO     Attending:Megan Herrera MD       Hospital Day: 2    Assessment and Recommendations   Enrique Pulliam is a 54 y.o. male with pmh of  who presents with Severe sepsis (HCC)      Plan:   Sepsis d/t enteritis  With leucocytosis , Lactic acidosis, tachycardia and hypotension  CTA abdomen is neg for bowel ischemia or perforation  Surgery following recommends conservative management  Cont on broad spectrum antibiotics  Advance diet as tolerated    Dispo improving likely dc in 24 hours      Diet ADULT DIET; Regular; 5 carb choices (75 gm/meal)   DVT Prophylaxis [] Lovenox, []  Heparin, [] SCDs, [] Ambulation,  [] Eliquis, [] Xarelto  [] Coumadin   Code Status Full Code   Disposition From:   Expected Disposition:   Estimated Date of Discharge:   Patient requires continued admission due to    Surrogate Decision Maker/ POA       Personally reviewed Lab Studies and Imaging     Discussed management of the case with     EKG interpreted personally and results         Subjective:     Chief Complaint:     Enrique Pulliam is a 54 y.o. male  PMH of hypertension, hyperlipidemia, type 2 diabetes who presented to ED with complaint of abdominal pain. CT abdomen showed enteritis  He is still getting diarrhea. Denies abdominal pain or vomiting.       Review of Systems:      Pertinent positives and negatives discussed in HPI    Objective:     Intake/Output Summary (Last 24 hours) at 2024 1902  Last data filed at 2024 0817  Gross per 24 hour   Intake 1058.83 ml   Output --   Net 1058.83 ml      Vitals:   Vitals:    24 0734 24 0748 24 1127 24 1618   BP: 104/64  100/66 108/68   Pulse: 64  62 70   Resp: 16      Temp: 98.1 °F (36.7 °C)  97.9 °F (36.6 °C) 98 °F (36.7 °C)   TempSrc: Oral

## 2024-06-02 VITALS
BODY MASS INDEX: 29.12 KG/M2 | DIASTOLIC BLOOD PRESSURE: 79 MMHG | SYSTOLIC BLOOD PRESSURE: 143 MMHG | HEART RATE: 66 BPM | TEMPERATURE: 97.9 F | RESPIRATION RATE: 18 BRPM | OXYGEN SATURATION: 97 % | WEIGHT: 220.68 LBS

## 2024-06-02 LAB
ALBUMIN SERPL-MCNC: 3.3 G/DL (ref 3.4–5)
ANION GAP SERPL CALCULATED.3IONS-SCNC: 7 MMOL/L (ref 3–16)
BASOPHILS # BLD: 0 K/UL (ref 0–0.2)
BASOPHILS NFR BLD: 0.4 %
BUN SERPL-MCNC: 10 MG/DL (ref 7–20)
C DIFF TOX A+B STL QL IA: NORMAL
CALCIUM SERPL-MCNC: 8.3 MG/DL (ref 8.3–10.6)
CHLORIDE SERPL-SCNC: 109 MMOL/L (ref 99–110)
CO2 SERPL-SCNC: 25 MMOL/L (ref 21–32)
CREAT SERPL-MCNC: 0.9 MG/DL (ref 0.9–1.3)
DEPRECATED RDW RBC AUTO: 13.1 % (ref 12.4–15.4)
EOSINOPHIL # BLD: 0.9 K/UL (ref 0–0.6)
EOSINOPHIL NFR BLD: 9.2 %
GFR SERPLBLD CREATININE-BSD FMLA CKD-EPI: >90 ML/MIN/{1.73_M2}
GLUCOSE BLD-MCNC: 132 MG/DL (ref 70–99)
GLUCOSE BLD-MCNC: 146 MG/DL (ref 70–99)
GLUCOSE SERPL-MCNC: 147 MG/DL (ref 70–99)
HCT VFR BLD AUTO: 41.8 % (ref 40.5–52.5)
HGB BLD-MCNC: 14.1 G/DL (ref 13.5–17.5)
LYMPHOCYTES # BLD: 3.1 K/UL (ref 1–5.1)
LYMPHOCYTES NFR BLD: 30.6 %
MCH RBC QN AUTO: 30.4 PG (ref 26–34)
MCHC RBC AUTO-ENTMCNC: 33.6 G/DL (ref 31–36)
MCV RBC AUTO: 90.4 FL (ref 80–100)
MONOCYTES # BLD: 0.7 K/UL (ref 0–1.3)
MONOCYTES NFR BLD: 7 %
NEUTROPHILS # BLD: 5.4 K/UL (ref 1.7–7.7)
NEUTROPHILS NFR BLD: 52.8 %
PERFORMED ON: ABNORMAL
PERFORMED ON: ABNORMAL
PHOSPHATE SERPL-MCNC: 1.9 MG/DL (ref 2.5–4.9)
PLATELET # BLD AUTO: 157 K/UL (ref 135–450)
PMV BLD AUTO: 9.5 FL (ref 5–10.5)
POTASSIUM SERPL-SCNC: 4.3 MMOL/L (ref 3.5–5.1)
RBC # BLD AUTO: 4.62 M/UL (ref 4.2–5.9)
SODIUM SERPL-SCNC: 141 MMOL/L (ref 136–145)
WBC # BLD AUTO: 10.2 K/UL (ref 4–11)

## 2024-06-02 PROCEDURE — 80069 RENAL FUNCTION PANEL: CPT

## 2024-06-02 PROCEDURE — 83036 HEMOGLOBIN GLYCOSYLATED A1C: CPT

## 2024-06-02 PROCEDURE — 2580000003 HC RX 258: Performed by: INTERNAL MEDICINE

## 2024-06-02 PROCEDURE — 6360000002 HC RX W HCPCS: Performed by: INTERNAL MEDICINE

## 2024-06-02 PROCEDURE — 87324 CLOSTRIDIUM AG IA: CPT

## 2024-06-02 PROCEDURE — 87449 NOS EACH ORGANISM AG IA: CPT

## 2024-06-02 PROCEDURE — 87506 IADNA-DNA/RNA PROBE TQ 6-11: CPT

## 2024-06-02 PROCEDURE — 2580000003 HC RX 258: Performed by: NURSE PRACTITIONER

## 2024-06-02 PROCEDURE — 85025 COMPLETE CBC W/AUTO DIFF WBC: CPT

## 2024-06-02 PROCEDURE — 36415 COLL VENOUS BLD VENIPUNCTURE: CPT

## 2024-06-02 RX ORDER — CIPROFLOXACIN 500 MG/1
500 TABLET, FILM COATED ORAL 2 TIMES DAILY
Qty: 20 TABLET | Refills: 0 | Status: SHIPPED | OUTPATIENT
Start: 2024-06-02 | End: 2024-06-12

## 2024-06-02 RX ORDER — METRONIDAZOLE 500 MG/1
500 TABLET ORAL 3 TIMES DAILY
Qty: 30 TABLET | Refills: 0 | Status: SHIPPED | OUTPATIENT
Start: 2024-06-02 | End: 2024-06-12

## 2024-06-02 RX ORDER — OXYCODONE HYDROCHLORIDE AND ACETAMINOPHEN 5; 325 MG/1; MG/1
1 TABLET ORAL EVERY 6 HOURS PRN
Qty: 12 TABLET | Refills: 0 | Status: SHIPPED | OUTPATIENT
Start: 2024-06-02 | End: 2024-06-05

## 2024-06-02 RX ADMIN — SODIUM CHLORIDE: 9 INJECTION, SOLUTION INTRAVENOUS at 09:24

## 2024-06-02 RX ADMIN — PIPERACILLIN AND TAZOBACTAM 3375 MG: 3; .375 INJECTION, POWDER, LYOPHILIZED, FOR SOLUTION INTRAVENOUS at 09:22

## 2024-06-02 RX ADMIN — PIPERACILLIN AND TAZOBACTAM 3375 MG: 3; .375 INJECTION, POWDER, LYOPHILIZED, FOR SOLUTION INTRAVENOUS at 00:15

## 2024-06-02 RX ADMIN — SODIUM CHLORIDE: 9 INJECTION, SOLUTION INTRAVENOUS at 01:36

## 2024-06-02 ASSESSMENT — PAIN SCALES - GENERAL
PAINLEVEL_OUTOF10: 2
PAINLEVEL_OUTOF10: 0

## 2024-06-02 ASSESSMENT — PAIN DESCRIPTION - DESCRIPTORS: DESCRIPTORS: DISCOMFORT

## 2024-06-02 ASSESSMENT — PAIN DESCRIPTION - LOCATION: LOCATION: ABDOMEN

## 2024-06-02 ASSESSMENT — PAIN DESCRIPTION - PAIN TYPE: TYPE: ACUTE PAIN

## 2024-06-02 ASSESSMENT — PAIN DESCRIPTION - ORIENTATION: ORIENTATION: RIGHT

## 2024-06-02 NOTE — CARE COORDINATION
DISCHARGE PLANNING:  Discharge order noted.  This CM met with patient at bedside to discuss discharge needs. He stated he has everything he needs at home and will have transportation.   He asked to have [facility] paperwork filled out; patient was directed to discuss with his PCP, stated understanding.    CM team available if discharge needs arise.  Jennifer Mcdaniels, RN Case Manager  [phone]

## 2024-06-02 NOTE — PROGRESS NOTES
DC paperwork gone over with pt. PIV and tele removed without complications. No questions or concerns to note. Pt dcd home by spouse. Electronically signed by Leticia Chan RN on 6/2/2024 at 1:45 PM

## 2024-06-02 NOTE — PROGRESS NOTES
V2.0    [facility] [place] [place] Progress Note      Name:  Enrique Pulliam /Age/Sex: 1970  (54 y.o. male)   MRN & CSN:  [id number] & [id number] Encounter Date/Time: 2024 7:02 PM EDT   Location:  [facility]3367/3367-01 PCP: Anibal Murcia DO     Attending:Cindy Lee MD       Hospital Day: 3    Assessment and Recommendations   Enrique Pulliam is a 54 y.o. male with pmh of  who presents with Severe sepsis (HCC)      Plan:   Sepsis d/t enteritis  With leucocytosis , Lactic acidosis, tachycardia and hypotension  CTA abdomen is neg for bowel ischemia or perforation  Marked improved  Toleratrting doet  Home on oral abx          Diet ADULT DIET; Regular; 5 carb choices (75 gm/meal)   DVT Prophylaxis [] Lovenox, []  Heparin, [] SCDs, [] Ambulation,  [] Eliquis, [] Xarelto  [] Coumadin   Code Status Full Code   Disposition Home once cleared by surgery    Surrogate Decision Maker/ POA       Personally reviewed Lab Studies and Imaging     Discussed management of the case with     EKG interpreted personally and results         Subjective:     Chief Complaint:     Enrique Pulliam is a 54 y.o. male  PMH of hypertension, hyperlipidemia, type 2 diabetes who presented to ED with complaint of abdominal pain. CT abdomen showed enteritis  Still with some diarrhea but improved  Tolerading diert ok       Review of Systems:      Pertinent positives and negatives discussed in HPI    Objective:   No intake or output data in the 24 hours ending 24 0834     Vitals:   Vitals:    24 2358 24 0600 24 0730   BP: 130/68 128/65 (!) 104/54 124/69   Pulse: 71 70 99 61   Resp: 18 18 18 18   Temp: 98.4 °F (36.9 °C) 98.1 °F (36.7 °C) 98.3 °F (36.8 °C) 98 °F (36.7 °C)   TempSrc: Oral Oral Oral Oral   SpO2: 96% 94%  96%   Weight:   100.1 kg (220 lb 10.9 oz)          Physical Exam:      General: NAD  Eyes: EOMI  ENT: neck supple  Cardiovascular: Regular rate.  Respiratory: Clear to auscultation  Gastrointestinal: Soft, non  fluid-filled nondilated loops of small bowel.  There is also liquid stool noted within the right colon. Pelvis: No pelvic masses or fluid collections are seen. Peritoneum/Retroperitoneum: The abdominal aorta is not aneurysmal. There are shotty mesenteric and retroperitoneal lymph nodes but no retroperitoneal or mesenteric lymphadenopathy is seen. Bones/Soft Tissues: No acute bony abnormalities are noted. There are shotty inguinal lymph nodes noted. VASCULAR The abdominal aorta is not aneurysmal.  No dissection is seen.  No aortic stenosis is noted.  The common iliac, external iliac and common femoral arteries demonstrate no significant disease or stenosis.  There are paired renal arteries bilaterally with no renal artery stenosis.  The celiac, superior mesenteric and inferior mesenteric arteries are all patent without stenosis.     1. No evidence for acute or chronic mesenteric ischemia. 2. Hepatic steatosis. 3. Fluid-filled nondilated loops of small bowel with liquid stool noted within the right colon. This is nonspecific but can be seen in the setting of enteritis.     XR CHEST PORTABLE    Result Date: 5/31/2024  EXAMINATION: ONE XRAY VIEW OF THE CHEST 5/31/2024 3:25 pm COMPARISON: Chest radiograph dated 04/04/2017 HISTORY: ORDERING SYSTEM PROVIDED HISTORY: Shortness of Breath TECHNOLOGIST PROVIDED HISTORY: Reason for exam:->Shortness of Breath Reason for Exam: Emesis FINDINGS: Medical devices: None. Mediastinum/Heart: The mediastinal contours are unchanged compared to prior exam. Lungs: The lungs are clear. Pleura: No findings to suggest pneumothorax or large pleural effusion. Bones/Soft tissues: Nothing acute.     Negative portable chest.       CBC:   Recent Labs     05/31/24  1628 06/01/24  0349 06/02/24  0759   WBC 24.1* 19.1* 10.2   HGB 20.2* 15.5 14.1    210 157     BMP:    Recent Labs     05/31/24  1527 05/31/24  1743 06/01/24  0349   * 135* 132*   K 4.4 4.3 4.1   CL 91* 92* 100   CO2 18* 22

## 2024-06-02 NOTE — PLAN OF CARE
Discharge  Flowsheets (Taken 6/2/2024 0800)  Maintains adequate nutritional intake:   Monitor percentage of each meal consumed   Monitor intake and output, weight and lab values     Problem: Infection - Adult  Goal: Absence of infection at discharge  6/2/2024 1325 by Leticia Chan RN  Outcome: Completed  6/2/2024 1107 by Leticia Chan RN  Outcome: Adequate for Discharge  Flowsheets (Taken 6/2/2024 0800)  Absence of infection at discharge:   Assess and monitor for signs and symptoms of infection   Monitor lab/diagnostic results   Monitor all insertion sites i.e., indwelling lines, tubes and drains   Administer medications as ordered   Instruct and encourage patient and family to use good hand hygiene technique  Goal: Absence of infection during hospitalization  6/2/2024 1325 by Leticia Chan RN  Outcome: Completed  6/2/2024 1107 by Leticia Chan RN  Outcome: Adequate for Discharge  Flowsheets (Taken 6/2/2024 0800)  Absence of infection during hospitalization:   Assess and monitor for signs and symptoms of infection   Monitor all insertion sites i.e., indwelling lines, tubes and drains   Monitor lab/diagnostic results   Instruct and encourage patient and family to use good hand hygiene technique   Administer medications as ordered  Goal: Absence of fever/infection during anticipated neutropenic period  6/2/2024 1325 by Leticia Chan RN  Outcome: Completed  6/2/2024 1107 by Leticia Chan RN  Outcome: Adequate for Discharge  Flowsheets (Taken 6/2/2024 0800)  Absence of fever/infection during anticipated neutropenic period:   Monitor white blood cell count   Administer growth factors as ordered     Problem: Metabolic/Fluid and Electrolytes - Adult  Goal: Electrolytes maintained within normal limits  6/2/2024 1325 by Leticia Chan RN  Outcome: Completed  6/2/2024 1107 by Leticia Chan RN  Outcome: Adequate for Discharge  Flowsheets (Taken 6/2/2024 0800)  Electrolytes maintained within  normal limits:   Monitor labs and assess patient for signs and symptoms of electrolyte imbalances   Administer electrolyte replacement as ordered   Monitor response to electrolyte replacements, including repeat lab results as appropriate  Goal: Hemodynamic stability and optimal renal function maintained  6/2/2024 1325 by Leticia Chan RN  Outcome: Completed  6/2/2024 1107 by Leticia Chan RN  Outcome: Adequate for Discharge  Flowsheets (Taken 6/2/2024 0800)  Hemodynamic stability and optimal renal function maintained:   Monitor labs and assess for signs and symptoms of volume excess or deficit   Monitor intake, output and patient weight   Monitor urine specific gravity, serum osmolarity and serum sodium as indicated or ordered   Monitor response to interventions for patient's volume status, including labs, urine output, blood pressure (other measures as available)   Encourage oral intake as appropriate  Goal: Glucose maintained within prescribed range  6/2/2024 1325 by Leticia Chan RN  Outcome: Completed  6/2/2024 1107 by Leticia Chan RN  Outcome: Adequate for Discharge  Flowsheets (Taken 6/2/2024 0800)  Glucose maintained within prescribed range:   Monitor blood glucose as ordered   Administer ordered medications to maintain glucose within target range   Assess for signs and symptoms of hyperglycemia and hypoglycemia     Problem: Pain  Goal: Verbalizes/displays adequate comfort level or baseline comfort level  6/2/2024 1325 by Leticia Chan RN  Outcome: Completed  6/2/2024 1107 by Leticia Chan RN  Outcome: Adequate for Discharge  Flowsheets (Taken 6/2/2024 0730)  Verbalizes/displays adequate comfort level or baseline comfort level:   Encourage patient to monitor pain and request assistance   Assess pain using appropriate pain scale   Administer analgesics based on type and severity of pain and evaluate response   Implement non-pharmacological measures as appropriate and evaluate

## 2024-06-02 NOTE — PLAN OF CARE
Problem: Discharge Planning  Goal: Discharge to home or other facility with appropriate resources  Outcome: Adequate for Discharge  Flowsheets (Taken 6/2/2024 0800)  Discharge to home or other facility with appropriate resources: Identify barriers to discharge with patient and caregiver     Problem: Safety - Adult  Goal: Free from fall injury  Outcome: Adequate for Discharge     Problem: ABCDS Injury Assessment  Goal: Absence of physical injury  Outcome: Adequate for Discharge     Problem: Infection - Adult  Goal: Absence of infection at discharge  Outcome: Adequate for Discharge  Flowsheets (Taken 6/2/2024 0800)  Absence of infection at discharge:   Assess and monitor for signs and symptoms of infection   Monitor lab/diagnostic results   Monitor all insertion sites i.e., indwelling lines, tubes and drains   Administer medications as ordered   Instruct and encourage patient and family to use good hand hygiene technique  Goal: Absence of infection during hospitalization  Outcome: Adequate for Discharge  Flowsheets (Taken 6/2/2024 0800)  Absence of infection during hospitalization:   Assess and monitor for signs and symptoms of infection   Monitor all insertion sites i.e., indwelling lines, tubes and drains   Monitor lab/diagnostic results   Instruct and encourage patient and family to use good hand hygiene technique   Administer medications as ordered  Goal: Absence of fever/infection during anticipated neutropenic period  Outcome: Adequate for Discharge  Flowsheets (Taken 6/2/2024 0800)  Absence of fever/infection during anticipated neutropenic period:   Monitor white blood cell count   Administer growth factors as ordered     Problem: Metabolic/Fluid and Electrolytes - Adult  Goal: Electrolytes maintained within normal limits  Outcome: Adequate for Discharge  Flowsheets (Taken 6/2/2024 0800)  Electrolytes maintained within normal limits:   Monitor labs and assess patient for signs and symptoms of electrolyte  environmental stimuli, as able  4. Instruct patient/family in relaxation techniques, as appropriate  5. Assess for spiritual pain/suffering and initiate Spiritual Care, Psychosocial Clinical Specialist consults as needed  Outcome: Adequate for Discharge  Flowsheets (Taken 6/2/2024 0800)  Patient/family able to verbalize anxieties, fears, and concerns, and demonstrate effective coping:   Assist patient/family to identify coping skills, available support systems and cultural and spiritual values   Provide emotional support, including active listening and acknowledgement of concerns of patient and caregivers   Instruct patient/family in relaxation techniques, as appropriate   Reduce environmental stimuli, as able   Assess for spiritual pain/suffering and initiate Spiritual Care, Psychosocial Clinical Specialist consults as needed     Problem: Gastrointestinal - Adult  Goal: Minimal or absence of nausea and vomiting  Outcome: Adequate for Discharge  Flowsheets (Taken 6/2/2024 0800)  Minimal or absence of nausea and vomiting:   Administer IV fluids as ordered to ensure adequate hydration   Administer ordered antiemetic medications as needed   Provide nonpharmacologic comfort measures as appropriate   Advance diet as tolerated, if ordered  Goal: Maintains or returns to baseline bowel function  Outcome: Adequate for Discharge  Flowsheets (Taken 6/2/2024 0800)  Maintains or returns to baseline bowel function:   Assess bowel function   Encourage oral fluids to ensure adequate hydration   Administer IV fluids as ordered to ensure adequate hydration   Administer ordered medications as needed   Encourage mobilization and activity  Goal: Maintains adequate nutritional intake  Outcome: Adequate for Discharge  Flowsheets (Taken 6/2/2024 0800)  Maintains adequate nutritional intake:   Monitor percentage of each meal consumed   Monitor intake and output, weight and lab values

## 2024-06-03 ENCOUNTER — TELEPHONE (OUTPATIENT)
Dept: INTERNAL MEDICINE CLINIC | Age: 54
End: 2024-06-03

## 2024-06-03 LAB
EST. AVERAGE GLUCOSE BLD GHB EST-MCNC: 154.2 MG/DL
HBA1C MFR BLD: 7 %
PATH INTERP BLD-IMP: NORMAL

## 2024-06-03 NOTE — TELEPHONE ENCOUNTER
Patient called for Dr. Murcia in regards to medication he was prescribed at the hospital yesterday, the two medications the patients had concerns for ciprofloxacin (CIPRO) 500 MG tablet and the metroNIDAZOLE (FLAGYL) 500 MG tablet. 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 Murcia opinion on still taking the medications.     Please advise and patient would like a callback at [phone]

## 2024-06-03 NOTE — TELEPHONE ENCOUNTER
He must take and complete the medications he was given.  If he is well enough to work, he can certainly use clothing and sunscreen to help protect his skin.

## 2024-06-04 ENCOUNTER — TELEPHONE (OUTPATIENT)
Dept: INTERNAL MEDICINE CLINIC | Age: 54
End: 2024-06-04

## 2024-06-04 LAB
BACTERIA BLD CULT ORG #2: NORMAL
BACTERIA BLD CULT: NORMAL
GI PATHOGENS PNL STL NAA+PROBE: NORMAL

## 2024-06-04 NOTE — TELEPHONE ENCOUNTER
Patient is scheduled for tomorrow at 4:30 for his hospital follow up. Will he need a separate appointment to have the [place] paperwork [place]?

## 2024-06-04 NOTE — TELEPHONE ENCOUNTER
FYI    Hosp Follow up on 6.5.24,Admitted for abdominal pain on 5.31 DC on 6.2.24.per patient needs to [place] paperwork filled out this week for his job.

## 2024-06-05 ENCOUNTER — OFFICE VISIT (OUTPATIENT)
Dept: INTERNAL MEDICINE CLINIC | Age: 54
End: 2024-06-05
Payer: COMMERCIAL

## 2024-06-05 VITALS
TEMPERATURE: 97.2 F | BODY MASS INDEX: 28.89 KG/M2 | DIASTOLIC BLOOD PRESSURE: 80 MMHG | SYSTOLIC BLOOD PRESSURE: 128 MMHG | HEIGHT: 73 IN | HEART RATE: 67 BPM | OXYGEN SATURATION: 95 % | WEIGHT: 218 LBS

## 2024-06-05 DIAGNOSIS — K52.9 GASTROENTERITIS: Primary | ICD-10-CM

## 2024-06-05 DIAGNOSIS — N17.9 AKI (ACUTE KIDNEY INJURY) (HCC): ICD-10-CM

## 2024-06-05 DIAGNOSIS — R19.7 DIARRHEA OF PRESUMED INFECTIOUS ORIGIN: ICD-10-CM

## 2024-06-05 DIAGNOSIS — E86.0 DEHYDRATION: ICD-10-CM

## 2024-06-05 DIAGNOSIS — R19.7 INTRACTABLE DIARRHEA: ICD-10-CM

## 2024-06-05 DIAGNOSIS — Z09 HOSPITAL DISCHARGE FOLLOW-UP: ICD-10-CM

## 2024-06-05 DIAGNOSIS — R53.1 GENERAL WEAKNESS: ICD-10-CM

## 2024-06-05 PROCEDURE — 99214 OFFICE O/P EST MOD 30 MIN: CPT | Performed by: INTERNAL MEDICINE

## 2024-06-05 PROCEDURE — 3079F DIAST BP 80-89 MM HG: CPT | Performed by: INTERNAL MEDICINE

## 2024-06-05 PROCEDURE — 3074F SYST BP LT 130 MM HG: CPT | Performed by: INTERNAL MEDICINE

## 2024-06-05 PROCEDURE — 1111F DSCHRG MED/CURRENT MED MERGE: CPT | Performed by: INTERNAL MEDICINE

## 2024-06-05 RX ORDER — DICYCLOMINE HCL 20 MG
20 TABLET ORAL 4 TIMES DAILY PRN
Qty: 60 TABLET | Refills: 0 | Status: SHIPPED | OUTPATIENT
Start: 2024-06-05

## 2024-06-05 SDOH — ECONOMIC STABILITY: INCOME INSECURITY: HOW HARD IS IT FOR YOU TO PAY FOR THE VERY BASICS LIKE FOOD, HOUSING, MEDICAL CARE, AND HEATING?: NOT HARD AT ALL

## 2024-06-05 SDOH — ECONOMIC STABILITY: FOOD INSECURITY: WITHIN THE PAST 12 MONTHS, YOU WORRIED THAT YOUR FOOD WOULD RUN OUT BEFORE YOU GOT MONEY TO BUY MORE.: NEVER TRUE

## 2024-06-05 SDOH — ECONOMIC STABILITY: FOOD INSECURITY: WITHIN THE PAST 12 MONTHS, THE FOOD YOU BOUGHT JUST DIDN'T LAST AND YOU DIDN'T HAVE MONEY TO GET MORE.: NEVER TRUE

## 2024-06-05 NOTE — PROGRESS NOTES
nightly TAKE 1 TABLET BY MOUTH EVERY DAY)    JANUMET  MG per tablet TAKE 1 TABLET BY MOUTH TWICE A DAY WITH FOOD (Patient taking differently: Take 1 tablet by mouth 2 times daily (with meals) TAKE 1 TABLET BY MOUTH TWICE A DAY WITH FOOD)    allopurinol (ZYLOPRIM) 100 MG tablet TAKE 1 TABLET BY MOUTH TWICE A DAY    rosuvastatin (CRESTOR) 20 MG tablet Take 0.5 tablets by mouth nightly    empagliflozin (JARDIANCE) 25 MG tablet Take 1 tablet by mouth daily    indomethacin (INDOCIN) 50 MG capsule Take 1 capsule by mouth 3 times daily as needed (gout attack) Take as needed for gout    celecoxib (CELEBREX) 200 MG capsule Take 1 capsule by mouth daily    ONE TOUCH ULTRA TEST strip TEST BLOOD SUGAR TWICE A DAY    aspirin EC 81 MG EC tablet Take 1 tablet by mouth daily        Medications patient taking as of now reconciled against medications ordered at time of hospital discharge: Yes    A comprehensive review of systems was negative except for what was noted in the HPI.    Objective:      Vitals:    06/05/24 1638   BP: 128/80   Pulse: 67   Temp: 97.2 °F (36.2 °C)   TempSrc: Temporal   SpO2: 95%   Weight: 98.9 kg (218 lb)   Height: 1.854 m (6' 1\")     Body mass index is 28.76 kg/m².     Wt Readings from Last 3 Encounters:   06/05/24 98.9 kg (218 lb)   06/02/24 100.1 kg (220 lb 10.9 oz)   02/22/24 99.3 kg (219 lb)     BP Readings from Last 3 Encounters:   06/05/24 128/80   06/02/24 (!) 143/79   02/22/24 124/68        GEN:  54 y.o. male who is in NAD, A&O.  He appears stated age and well nourished.  He is cooperative and pleasant.    HEAD:  NC/AT, no lesions.  EYES:  UCHE, EOMI, No scleral icterus or conjunctival injection or discharge.  Visual fields in tact to confrontation.  Fundoscopic (non-dilated) grossly normal.  Disc margins well demarcated.  EARS:  EAC's clear, TM's normal.  MOUTH & THROAT:  Oral cavity is clear without mucosal lesions.  Tongue is midline.  Dentition is in good repair.  No pharyngeal erythema

## 2024-06-10 ENCOUNTER — HOSPITAL ENCOUNTER (OUTPATIENT)
Age: 54
Discharge: HOME | End: 2024-06-10
Payer: COMMERCIAL

## 2024-06-10 DIAGNOSIS — R19.7 DIARRHEA OF PRESUMED INFECTIOUS ORIGIN: ICD-10-CM

## 2024-06-10 DIAGNOSIS — R19.7 INTRACTABLE DIARRHEA: ICD-10-CM

## 2024-06-10 DIAGNOSIS — R53.1 GENERAL WEAKNESS: ICD-10-CM

## 2024-06-10 DIAGNOSIS — E86.0 DEHYDRATION: ICD-10-CM

## 2024-06-10 DIAGNOSIS — N17.9 AKI (ACUTE KIDNEY INJURY) (HCC): ICD-10-CM

## 2024-06-10 LAB
ALBUMIN SERPL-MCNC: 4.1 G/DL (ref 3.4–5)
ALBUMIN/GLOB SERPL: 1.3 {RATIO} (ref 1.1–2.2)
ALP SERPL-CCNC: 81 U/L (ref 40–129)
ALT SERPL-CCNC: 56 U/L (ref 10–40)
ANION GAP SERPL CALCULATED.3IONS-SCNC: 11 MMOL/L (ref 3–16)
AST SERPL-CCNC: 35 U/L (ref 15–37)
BASOPHILS # BLD: 0.2 K/UL (ref 0–0.2)
BASOPHILS NFR BLD: 1.9 %
BILIRUB SERPL-MCNC: 0.4 MG/DL (ref 0–1)
BUN SERPL-MCNC: 11 MG/DL (ref 7–20)
CALCIUM SERPL-MCNC: 9.5 MG/DL (ref 8.3–10.6)
CHLORIDE SERPL-SCNC: 101 MMOL/L (ref 99–110)
CO2 SERPL-SCNC: 25 MMOL/L (ref 21–32)
CREAT SERPL-MCNC: 1 MG/DL (ref 0.9–1.3)
CRP SERPL-MCNC: <3 MG/L (ref 0–5.1)
DEPRECATED RDW RBC AUTO: 13.1 % (ref 12.4–15.4)
EOSINOPHIL # BLD: 1.2 K/UL (ref 0–0.6)
EOSINOPHIL NFR BLD: 12.1 %
ERYTHROCYTE [SEDIMENTATION RATE] IN BLOOD BY WESTERGREN METHOD: 11 MM/HR (ref 0–20)
GFR SERPLBLD CREATININE-BSD FMLA CKD-EPI: 88 ML/MIN/{1.73_M2}
GLUCOSE SERPL-MCNC: 173 MG/DL (ref 70–99)
HCT VFR BLD AUTO: 49.1 % (ref 40.5–52.5)
HGB BLD-MCNC: 16.8 G/DL (ref 13.5–17.5)
LACTOFERRIN STL QL IA: NORMAL
LIPASE SERPL-CCNC: 27 U/L (ref 13–60)
LYMPHOCYTES # BLD: 3 K/UL (ref 1–5.1)
LYMPHOCYTES NFR BLD: 29.1 %
MCH RBC QN AUTO: 30.9 PG (ref 26–34)
MCHC RBC AUTO-ENTMCNC: 34.3 G/DL (ref 31–36)
MONOCYTES # BLD: 0.7 K/UL (ref 0–1.3)
MONOCYTES NFR BLD: 6.9 %
NEUTROPHILS # BLD: 5.1 K/UL (ref 1.7–7.7)
NEUTROPHILS NFR BLD: 50 %
PLATELET # BLD AUTO: 167 K/UL (ref 135–450)
PMV BLD AUTO: 10.9 FL (ref 5–10.5)
POTASSIUM SERPL-SCNC: 4.4 MMOL/L (ref 3.5–5.1)
PROT SERPL-MCNC: 7.2 G/DL (ref 6.4–8.2)
RBC # BLD AUTO: 5.46 M/UL (ref 4.2–5.9)
SODIUM SERPL-SCNC: 137 MMOL/L (ref 136–145)
WBC # BLD AUTO: 10.2 K/UL (ref 4–11)

## 2024-06-10 PROCEDURE — 36415 COLL VENOUS BLD VENIPUNCTURE: CPT

## 2024-06-10 PROCEDURE — 85025 COMPLETE CBC W/AUTO DIFF WBC: CPT

## 2024-06-10 PROCEDURE — 80053 COMPREHEN METABOLIC PANEL: CPT

## 2024-06-10 PROCEDURE — 83630 LACTOFERRIN FECAL (QUAL): CPT

## 2024-06-10 PROCEDURE — 83993 ASSAY FOR CALPROTECTIN FECAL: CPT

## 2024-06-10 PROCEDURE — 87328 CRYPTOSPORIDIUM AG IA: CPT

## 2024-06-10 PROCEDURE — 82653 EL-1 FECAL QUANTITATIVE: CPT

## 2024-06-10 PROCEDURE — 87329 GIARDIA AG IA: CPT

## 2024-06-10 PROCEDURE — 83690 ASSAY OF LIPASE: CPT

## 2024-06-10 PROCEDURE — 86140 C-REACTIVE PROTEIN: CPT

## 2024-06-10 PROCEDURE — 85652 RBC SED RATE AUTOMATED: CPT

## 2024-06-10 PROCEDURE — 87336 ENTAMOEB HIST DISPR AG IA: CPT

## 2024-06-11 LAB
CRYPTOSP AG STL QL IA: NORMAL
E HISTOLYT AG STL QL IA: NORMAL
G LAMBLIA AG STL QL IA: NORMAL

## 2024-06-11 NOTE — PROGRESS NOTES
Physician Progress Note      PATIENT:               ANNIE ROBLEDO  CSN #:                  [id number]  :                       1970  ADMIT DATE:       2024 2:45 PM  DISCH DATE:        2024 1:54 PM  RESPONDING  PROVIDER #:        Cindy Pardo MD          QUERY TEXT:    Pt admitted with nausea, vomiting and diarrhea for 1 week. Pt noted to have   enteritis. Per ED provider note, Septic shock. If possible, please document in   progress notes and discharge summary the relationship, if any, between    The medical record reflects the following:  Risk Factors: Age, Hx- hypertension, hyperlipidemia, diabetes  Clinical Indicators: VS- 98.6, 123, 24, 83/57 - 121/96 - 100/66....lactic acid   5.9 - 6.4, WBC 24.1, Procal 2.38.....CTA Abd-Fluid-filled nondilated loops of   small bowel with liquid stool noted within the right colon. This is   nonspecific but can be seen in the setting of enteritis.  Treatment: NS IV bolus 1979ml, Zosyn IV, NS IV bolus 1000cc    Thank You,  Jennifer Ramos RN BSN CDS CRCR  [email or web address]  Options provided:  -- Severe sepsis due to likely bacterial enteritis with septic shock ruled out  -- Septic shock likely due to bacterial enteritis as evidence by, Please   specify evidence.  -- Other - I will add my own diagnosis  -- Disagree - Not applicable / Not valid  -- Disagree - Clinically unable to determine / Unknown  -- Refer to Clinical Documentation Reviewer    PROVIDER RESPONSE TEXT:    This patient has severe sepsis due to likely bacterial enteritis with septic   shock ruled out.    Query created by: Jennifer Ramos on 2024 10:23 AM      Electronically signed by:  Cindy Pardo MD 2024 9:06 AM

## 2024-06-12 LAB
CALPROTECTIN STL-MCNT: 6 UG/G
ELASTASE PANC STL-MCNT: 463 UG/G

## 2024-06-13 DIAGNOSIS — R19.7 DIARRHEA OF PRESUMED INFECTIOUS ORIGIN: ICD-10-CM

## 2024-06-13 DIAGNOSIS — R19.7 INTRACTABLE DIARRHEA: ICD-10-CM

## 2024-06-13 RX ORDER — DICYCLOMINE HCL 20 MG
20 TABLET ORAL 4 TIMES DAILY PRN
Qty: 360 TABLET | Refills: 1 | OUTPATIENT
Start: 2024-06-13

## 2024-06-13 NOTE — TELEPHONE ENCOUNTER
Last appointment: 6/5/2024  Next appointment:   Last refill:     Pharmacy comment: REQUEST FOR 90 DAYS PRESCRIPTION. DX Code Needed.

## 2024-06-13 NOTE — DISCHARGE SUMMARY
[facility]    Patient Demographics    Patient. Enrique Pulliam  Date of Birth. 1970  MRN. [id number]     Primary care provider. Anibal Murcia DO  (Tel: [phone])    Admit date: 5/31/2024    Discharge date (blank if same as Note Date): 6/2/2024  Note Date: 6/13/2024     Reason for Hospitalization.   Chief Complaint   Patient presents with    Emesis     Pt arrives from home. Pt C/O N/V/D for a week now. Pt rates his pain a 2/10.           Problem-based Hospital Course.  Sepsis d/t enteritis resolved  With leucocytosis , Lactic acidosis, tachycardia and hypotension  CTA abdomen is neg for bowel ischemia or perforation  Treated with IV antibiotics and patient stands up antibiotic  Discharged stable     Consults.  IP CONSULT TO GENERAL SURGERY  IP CONSULT TO HOSPITALIST    Physical examination on discharge day.   BP (!) 143/79   Pulse 66   Temp 97.9 °F (36.6 °C) (Oral)   Resp 18   Wt 100.1 kg (220 lb 10.9 oz)   SpO2 97%   BMI 29.12 kg/m²   General appearance.  Alert. Looks comfortable.  HEENT. Sclera clear. Moist mucus membranes.  Cardiovascular. Regular rate and rhythm, normal S1, S2. No murmur.   Respiratory. Not using accessory muscles.Clear to auscultation bilaterally, no wheeze.  Gastrointestinal. Abdomen soft, non-tender, not distended, normal bowel sounds  Neurology. Facial symmetry. No speech deficits. Moving all extremities equally.  Extremities. No edema in lower extremities.  Skin. Warm, dry, normal turgor    Condition at time of discharge stable     Medication instructions provided to patient at discharge.     Medication List        CHANGE how you take these medications      Janumet  MG per tablet  Generic drug: sitaGLIPtan-metFORMIN  TAKE 1 TABLET BY MOUTH TWICE A DAY WITH FOOD  What changed: additional instructions

## 2024-06-13 NOTE — TELEPHONE ENCOUNTER
Last appointment: 6/5/2024  Next appointment: Visit date not found  Last refill: 6/5/24  15 day supply.     Pharmacy is requesting a 90 day supply.     Unsure if this is appropriate.

## 2024-07-30 ENCOUNTER — OFFICE VISIT (OUTPATIENT)
Dept: ORTHOPEDIC SURGERY | Age: 54
End: 2024-07-30

## 2024-07-30 DIAGNOSIS — M22.42 CHONDROMALACIA OF BOTH PATELLAE: ICD-10-CM

## 2024-07-30 DIAGNOSIS — G89.29 CHRONIC PAIN OF BOTH KNEES: ICD-10-CM

## 2024-07-30 DIAGNOSIS — M17.0 PRIMARY OSTEOARTHRITIS OF BOTH KNEES: Primary | ICD-10-CM

## 2024-07-30 DIAGNOSIS — M25.561 CHRONIC PAIN OF BOTH KNEES: ICD-10-CM

## 2024-07-30 DIAGNOSIS — M25.562 CHRONIC PAIN OF BOTH KNEES: ICD-10-CM

## 2024-07-30 DIAGNOSIS — M22.41 CHONDROMALACIA OF BOTH PATELLAE: ICD-10-CM

## 2024-07-30 RX ORDER — METHYLPREDNISOLONE 4 MG/1
TABLET ORAL
Qty: 21 KIT | Refills: 0 | Status: SHIPPED | OUTPATIENT
Start: 2024-07-30

## 2024-07-30 RX ORDER — CELECOXIB 200 MG/1
200 CAPSULE ORAL DAILY
Qty: 30 CAPSULE | Refills: 5 | Status: SHIPPED | OUTPATIENT
Start: 2024-07-30

## 2024-07-30 NOTE — PROGRESS NOTES
crepitation than actual pop or click.  No evidence of instability.     Skin: There are no rashes, ulcerations or lesions. Distal neurovascular exam is intact.     Gait: Improved gait.     Reflex reflexes are preserved her     Additional Comments:      Additional Examinations:  Right Lower Extremity: Examination of the right lower extremity does not show any tenderness, deformity or injury.  Range of motion is unremarkable.  There is no gross instability.  There are no rashes, ulcerations or lesions.  Strength and tone are normal.  Left Lower Extremity: Examination of the left lower extremity does not show any tenderness, deformity or injury.  Range of motion is unremarkable.  There is no gross instability.  There are no rashes, ulcerations or lesions.  Strength and tone are normal.  Lower Back: Examination of the lower back does not show any tenderness, deformity or injury.  Range of motion is unremarkable.  There is no gross instability.  There are no rashes, ulcerations or lesions.  Strength and tone are normal.  Examination of the bilateral hip reveals intact skin.  The patient demonstrates full painless range of motion with regards to flexion, abduction, internal and external rotation.  There is not tenderness about the greater trochanter.  There is a negative straight leg raise against resistance.  Strength is 5/5 thorough out all planes.        Diagnostic Test Findings: Bilateral knee MRIs performed 5/5/2017 as listed above     Right knee MRI 5/5/2017  CONCLUSION:    1. Grade 3-4 chondromalacia of lateral facet of the patella with foci of osteochondral erosion    and chondral fissuring.    2. No meniscal tear or acute ligamentous injury.    3. Mild chronic lateral capsulitis and popliteal tendinopathy.    4. Enthesopathy of superior patellar tendon, no tear.       Left knee MRI 5/5/2017  CONCLUSION:    1. Stable grade 3-4 chondromalacia of lateral facet of the patella with focal osteochondral    erosion,

## 2024-08-01 ENCOUNTER — PATIENT MESSAGE (OUTPATIENT)
Dept: INTERNAL MEDICINE CLINIC | Age: 54
End: 2024-08-01

## 2024-08-01 DIAGNOSIS — R19.7 INTRACTABLE DIARRHEA: ICD-10-CM

## 2024-08-01 DIAGNOSIS — R19.7 DIARRHEA OF PRESUMED INFECTIOUS ORIGIN: ICD-10-CM

## 2024-08-01 RX ORDER — DICYCLOMINE HCL 20 MG
20 TABLET ORAL 4 TIMES DAILY PRN
Qty: 60 TABLET | Refills: 5 | Status: SHIPPED | OUTPATIENT
Start: 2024-08-01

## 2024-08-01 NOTE — TELEPHONE ENCOUNTER
Last appointment: 6/5/2024    Return in 6 days (on 6/11/2024).  Next appointment: Visit date not found  Last refill: 06/05/2024  Sent [employer] message to schedule due/overdue appointment.

## 2024-08-06 ENCOUNTER — TELEPHONE (OUTPATIENT)
Dept: ORTHOPEDIC SURGERY | Age: 54
End: 2024-08-06

## 2024-08-06 NOTE — TELEPHONE ENCOUNTER
LVM THAT CORTISONE AND EUFLEXXA INJECTIONS HAVE BOTH BEEN APPROVED THROUGH [employer] AND WE CAN GET HIM SCHEDULED AT HIS CONVENIENCE.

## 2024-08-08 ENCOUNTER — OFFICE VISIT (OUTPATIENT)
Dept: ORTHOPEDIC SURGERY | Age: 54
End: 2024-08-08

## 2024-08-08 DIAGNOSIS — G89.29 CHRONIC PAIN OF BOTH KNEES: ICD-10-CM

## 2024-08-08 DIAGNOSIS — M22.41 CHONDROMALACIA OF BOTH PATELLAE: ICD-10-CM

## 2024-08-08 DIAGNOSIS — M25.561 CHRONIC PAIN OF BOTH KNEES: ICD-10-CM

## 2024-08-08 DIAGNOSIS — M25.562 CHRONIC PAIN OF BOTH KNEES: ICD-10-CM

## 2024-08-08 DIAGNOSIS — M17.0 PRIMARY OSTEOARTHRITIS OF BOTH KNEES: Primary | ICD-10-CM

## 2024-08-08 DIAGNOSIS — M22.42 CHONDROMALACIA OF BOTH PATELLAE: ICD-10-CM

## 2024-08-08 RX ORDER — BUPIVACAINE HYDROCHLORIDE 2.5 MG/ML
4 INJECTION, SOLUTION INFILTRATION; PERINEURAL ONCE
Status: COMPLETED | OUTPATIENT
Start: 2024-08-08 | End: 2024-08-08

## 2024-08-08 RX ORDER — BETAMETHASONE SODIUM PHOSPHATE AND BETAMETHASONE ACETATE 3; 3 MG/ML; MG/ML
24 INJECTION, SUSPENSION INTRA-ARTICULAR; INTRALESIONAL; INTRAMUSCULAR; SOFT TISSUE ONCE
Status: COMPLETED | OUTPATIENT
Start: 2024-08-08 | End: 2024-08-08

## 2024-08-08 RX ADMIN — Medication 2 ML: at 08:37

## 2024-08-08 RX ADMIN — BUPIVACAINE HYDROCHLORIDE 10 MG: 2.5 INJECTION, SOLUTION INFILTRATION; PERINEURAL at 08:37

## 2024-08-08 RX ADMIN — BETAMETHASONE SODIUM PHOSPHATE AND BETAMETHASONE ACETATE 24 MG: 3; 3 INJECTION, SUSPENSION INTRA-ARTICULAR; INTRALESIONAL; INTRAMUSCULAR; SOFT TISSUE at 08:36

## 2024-08-08 NOTE — PROGRESS NOTES
Chief Complaint  Knee Pain (WC CK JUDSON KNEES/REQ CORTISONE )      followup recurrent bilateral knee pain with substantial patellofemoral arthropathy with underlying knee osteoarthritis with mild to moderate recurrent synovitis is post completion of the most recent round of viscosupplementation 1/24/2024 using Euflexxa bilateral    Enrique Pulliam is a 54 y.o. 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  He is being seen back today for follow-up on his bilateral knee symptomatic chondromalacia patella with underlying knee osteoarthritis.  He last completed his Visco supplementation on 1/24/2024 which did provide very good pain relief for him but over the last few weeks with the weather change he has become a little bit more sore primarily anteriorly involving his knees..      History of Present Illness for Follow Up Patient:      Enrique is being seen in follow up today for [facility] claim for chronic bilateral knee pain. The patient rates their current pain as a  5-6  out of 10 on the pain scale. Activities aggravating current symptoms include walking long durations and after sitting for long periods of time.  Activities relieving current symptoms include rest. Conservative treatment to date includes: rest, ice, NSAID X2 100 mg daily, knee bracing, home exercises, cortisone injections and Euflexxa most recently completed on 5/21/2019.   Denies locking catching or true instability symptoms.  He has continue with his exercise program does utilize his knee braces periodically.  He is continued to lose weight and has lost about 50 pounds.  Denies locking catching or instability symptoms.    Enrique was last seen in the office on 1/24/2023 for his bilateral knee osteoarthritis and patellofemoral arthropathy.  He has had progression of his knee pain symptoms which not range between a 4-8 out of 10.  He is currently off

## 2024-08-10 DIAGNOSIS — R19.7 INTRACTABLE DIARRHEA: ICD-10-CM

## 2024-08-10 DIAGNOSIS — R19.7 DIARRHEA OF PRESUMED INFECTIOUS ORIGIN: ICD-10-CM

## 2024-08-12 RX ORDER — DICYCLOMINE HCL 20 MG
20 TABLET ORAL 4 TIMES DAILY PRN
Qty: 180 TABLET | Refills: 1 | Status: SHIPPED | OUTPATIENT
Start: 2024-08-12

## 2024-08-12 NOTE — TELEPHONE ENCOUNTER
Last appointment: 6/5/2024  Next appointment: Visit date not found  Last refill: 8/1/24    Requested Prescriptions     Pending Prescriptions Disp Refills    dicyclomine (BENTYL) 20 MG tablet [Pharmacy Med Name: DICYCLOMINE 20 MG TABLET] 360 tablet 1     Sig: TAKE 1 TABLET BY MOUTH 4 TIMES DAILY AS NEEDED (ABDOMINA CRAMPING OR DIARRHEA)

## 2024-08-13 ENCOUNTER — OFFICE VISIT (OUTPATIENT)
Dept: ORTHOPEDIC SURGERY | Age: 54
End: 2024-08-13

## 2024-08-13 DIAGNOSIS — M17.0 PRIMARY OSTEOARTHRITIS OF BOTH KNEES: Primary | ICD-10-CM

## 2024-08-13 DIAGNOSIS — M25.562 CHRONIC PAIN OF BOTH KNEES: ICD-10-CM

## 2024-08-13 DIAGNOSIS — M22.41 CHONDROMALACIA OF BOTH PATELLAE: ICD-10-CM

## 2024-08-13 DIAGNOSIS — M25.561 CHRONIC PAIN OF BOTH KNEES: ICD-10-CM

## 2024-08-13 DIAGNOSIS — M22.42 CHONDROMALACIA OF BOTH PATELLAE: ICD-10-CM

## 2024-08-13 DIAGNOSIS — G89.29 CHRONIC PAIN OF BOTH KNEES: ICD-10-CM

## 2024-08-13 RX ORDER — HYALURONATE SODIUM 10 MG/ML
40 SYRINGE (ML) INTRAARTICULAR ONCE
Status: COMPLETED | OUTPATIENT
Start: 2024-08-13 | End: 2024-08-13

## 2024-08-13 RX ADMIN — Medication 40 MG: at 13:21

## 2024-08-13 NOTE — PROGRESS NOTES
Chief Complaint  Knee Pain (FU  JUDSON KNEES )      followup recurrent bilateral knee pain with substantial patellofemoral arthropathy with underlying knee osteoarthritis with mild to moderate recurrent synovitis is post completion of the most recent round of viscosupplementation 1/24/2024 using Euflexxa bilateral    Enrique Pulliam is a 54 y.o. 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  He is being seen back today for follow-up on his bilateral knee symptomatic chondromalacia patella with underlying knee osteoarthritis.  He last completed his Visco supplementation on 1/24/2024 which did provide very good pain relief for him but over the last few weeks with the weather change he has become a little bit more sore primarily anteriorly involving his knees..      History of Present Illness for Follow Up Patient:      Enrique is being seen in follow up today for [facility] claim for chronic bilateral knee pain. The patient rates their current pain as a  5-6  out of 10 on the pain scale. Activities aggravating current symptoms include walking long durations and after sitting for long periods of time.  Activities relieving current symptoms include rest. Conservative treatment to date includes: rest, ice, NSAID X2 100 mg daily, knee bracing, home exercises, cortisone injections and Euflexxa most recently completed on 5/21/2019.   Denies locking catching or true instability symptoms.  He has continue with his exercise program does utilize his knee braces periodically.  He is continued to lose weight and has lost about 50 pounds.  Denies locking catching or instability symptoms.    Enrique was last seen in the office on 1/24/2023 for his bilateral knee osteoarthritis and patellofemoral arthropathy.  He has had progression of his knee pain symptoms which not range between a 4-8 out of 10.  He is currently off work for his ankle

## 2024-08-20 ENCOUNTER — OFFICE VISIT (OUTPATIENT)
Dept: ORTHOPEDIC SURGERY | Age: 54
End: 2024-08-20

## 2024-08-20 VITALS — BODY MASS INDEX: 28.89 KG/M2 | WEIGHT: 218 LBS | HEIGHT: 73 IN

## 2024-08-20 DIAGNOSIS — G89.29 CHRONIC PAIN OF BOTH KNEES: ICD-10-CM

## 2024-08-20 DIAGNOSIS — M17.0 PRIMARY OSTEOARTHRITIS OF BOTH KNEES: Primary | ICD-10-CM

## 2024-08-20 DIAGNOSIS — M25.562 CHRONIC PAIN OF BOTH KNEES: ICD-10-CM

## 2024-08-20 DIAGNOSIS — M22.42 CHONDROMALACIA OF BOTH PATELLAE: ICD-10-CM

## 2024-08-20 DIAGNOSIS — M22.41 CHONDROMALACIA OF BOTH PATELLAE: ICD-10-CM

## 2024-08-20 DIAGNOSIS — M25.561 CHRONIC PAIN OF BOTH KNEES: ICD-10-CM

## 2024-08-20 RX ORDER — CELECOXIB 200 MG/1
200 CAPSULE ORAL DAILY
Qty: 30 CAPSULE | Refills: 3 | Status: SHIPPED | OUTPATIENT
Start: 2024-08-20

## 2024-08-20 RX ORDER — HYALURONATE SODIUM 10 MG/ML
20 SYRINGE (ML) INTRAARTICULAR ONCE
Status: COMPLETED | OUTPATIENT
Start: 2024-08-20 | End: 2024-08-20

## 2024-08-20 RX ADMIN — Medication 20 MG: at 13:11

## 2024-08-20 NOTE — PROGRESS NOTES
patient was advised to ice the knee for 15-20 minutes to relieve any injection site related pain.    FOLLOW-UP: as directed.  We did send his Celebrex 200 mg daily again to the pharmacy once again and he will continue with his his home exercise program and bracing.  He once again has done reasonably well with viscosupplementation in the past and will continue with his stretching and periodic use of his knee brace.  Will see him back next week to finish up his Euflexxa series which is typically done well with he is aware it may take 6 to 8 weeks to see maximum efficacy with his medications but is typically done well with this in the past.  He is aware that we can have this repeated at 6-month intervals with interim steroid injections as necessary and will continue with his bracing exercise program.  He will contact us in the interim with questions or concerns

## 2024-08-24 DIAGNOSIS — M22.42 CHONDROMALACIA OF BOTH PATELLAE: ICD-10-CM

## 2024-08-24 DIAGNOSIS — M17.0 PRIMARY OSTEOARTHRITIS OF BOTH KNEES: ICD-10-CM

## 2024-08-24 DIAGNOSIS — M22.41 CHONDROMALACIA OF BOTH PATELLAE: ICD-10-CM

## 2024-08-24 DIAGNOSIS — M25.562 CHRONIC PAIN OF BOTH KNEES: ICD-10-CM

## 2024-08-24 DIAGNOSIS — M25.561 CHRONIC PAIN OF BOTH KNEES: ICD-10-CM

## 2024-08-24 DIAGNOSIS — G89.29 CHRONIC PAIN OF BOTH KNEES: ICD-10-CM

## 2024-08-26 RX ORDER — CELECOXIB 200 MG/1
CAPSULE ORAL DAILY
Qty: 90 CAPSULE | Refills: 1 | Status: SHIPPED | OUTPATIENT
Start: 2024-08-26

## 2024-08-26 RX ORDER — EMPAGLIFLOZIN 25 MG/1
25 TABLET, FILM COATED ORAL DAILY
Qty: 90 TABLET | Refills: 3 | Status: SHIPPED | OUTPATIENT
Start: 2024-08-26

## 2024-08-27 ENCOUNTER — OFFICE VISIT (OUTPATIENT)
Dept: ORTHOPEDIC SURGERY | Age: 54
End: 2024-08-27

## 2024-08-27 DIAGNOSIS — M22.41 CHONDROMALACIA OF BOTH PATELLAE: ICD-10-CM

## 2024-08-27 DIAGNOSIS — M22.42 CHONDROMALACIA OF BOTH PATELLAE: ICD-10-CM

## 2024-08-27 DIAGNOSIS — G89.29 CHRONIC PAIN OF BOTH KNEES: ICD-10-CM

## 2024-08-27 DIAGNOSIS — M25.562 CHRONIC PAIN OF BOTH KNEES: ICD-10-CM

## 2024-08-27 DIAGNOSIS — M25.561 CHRONIC PAIN OF BOTH KNEES: ICD-10-CM

## 2024-08-27 DIAGNOSIS — M17.0 PRIMARY OSTEOARTHRITIS OF BOTH KNEES: Primary | ICD-10-CM

## 2024-08-27 RX ORDER — HYALURONATE SODIUM 10 MG/ML
20 SYRINGE (ML) INTRAARTICULAR ONCE
Status: COMPLETED | OUTPATIENT
Start: 2024-08-27 | End: 2024-08-27

## 2024-08-27 RX ADMIN — Medication 20 MG: at 13:08

## 2024-08-27 NOTE — PROGRESS NOTES
CC:  FU Knee Osteoarthritis with Viscosupplementation      Knee Pain     followup recurrent bilateral knee pain with substantial patellofemoral arthropathy with underlying knee osteoarthritis with mild to moderate recurrent synovitis is post completion of the most recent round of viscosupplementation 3/21/2023 using Euflexxa bilateral    Enrique Pulliam is a 52 y.o. 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  He is being seen back today for follow-up on his bilateral knee symptomatic chondromalacia patella with underlying knee osteoarthritis.  He last completed his Visco supplementation on 3/21/2023 which did provide very good pain relief for him but over the last few weeks with the weather change he has become a little bit more sore primarily anteriorly involving his knees..      History of Present Illness for Follow Up Patient:      Enrique is being seen in follow up today for [facility] claim for chronic bilateral knee pain. The patient rates their current pain as a 5-6 out of 10 on the pain scale. Activities aggravating current symptoms include walking long durations and after sitting for long periods of time.  Activities relieving current symptoms include rest. Conservative treatment to date includes: rest, ice, NSAID X2 100 mg daily, knee bracing, home exercises, cortisone injections and Euflexxa most recently completed on 5/21/2019.   Denies locking catching or true instability symptoms.  He has continue with his exercise program does utilize his knee braces periodically.  He is continued to lose weight and has lost about 50 pounds.  Denies locking catching or instability symptoms.    With I saw Enrique in the office on 2/1/2022 when we completed his Euflexxa bilaterally for his underlying knee osteoarthritis and chondromalacia patella.  He did reasonably well throughout the remainder of [place] in early spring but over the last several  in the office on 8/8/2024 for continued treatment of his bilateral knee osteoarthritis with patellofemoral arthropathy.  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  He does continue with the Celebrex does utilize his brace and has been very good about maintaining his weight loss and performing his home-based exercises.  Denies locking catching true instability symptoms or swelling.  He has been contemplating viscosupplementation once again and would like to avoid surgery if at all possible.  Denies substantial rest or night pain currently.    With I saw Enrique in the office on 8/13/2024 and was continued on treatment for his bilateral knee osteoarthritis patellofemoral arthropathy.  He has noticed some improvement of his knee pain symptoms with his walking and stair climbing at work.  He apparently had run out of his Celebrex although we did put in a refill authorization towards the end of July.  He has not been on this consistently.  He is here today for second Euflexxa injection and once again would like to avoid surgery if at all possible.  Denies locking catching or true instability symptoms.  He does utilize his brace and has been very good about his weight loss maintenance.    Enrique was last seen in the office on 8/20/2024 was continued on treatment for his bilateral knee osteoarthritis with patellofemoral arthropathy.  Overall he is doing much better at this point.  He is now back on his Celebrex and has been working his exercise program.  He is here today for his final Euflexxa injection and would like to avoid surgery if at all possible.  Will occasionally utilize his knee brace has been very good about maintaining his weight loss and keeping up with his exercise program.  Denies locking catching true instability symptoms rest or night pain    PE: no substantial change in exam.    Assessment:  Knee

## 2024-09-15 DIAGNOSIS — R19.7 DIARRHEA OF PRESUMED INFECTIOUS ORIGIN: ICD-10-CM

## 2024-09-15 DIAGNOSIS — R19.7 INTRACTABLE DIARRHEA: ICD-10-CM

## 2024-09-16 RX ORDER — DICYCLOMINE HCL 20 MG
20 TABLET ORAL 4 TIMES DAILY PRN
Qty: 360 TABLET | Refills: 1 | Status: SHIPPED | OUTPATIENT
Start: 2024-09-16

## 2024-10-07 DIAGNOSIS — I10 BENIGN ESSENTIAL HTN: ICD-10-CM

## 2024-10-08 RX ORDER — LISINOPRIL 5 MG/1
TABLET ORAL
Qty: 90 TABLET | Refills: 1 | OUTPATIENT
Start: 2024-10-08

## 2024-10-08 NOTE — TELEPHONE ENCOUNTER
Medication:   Requested Prescriptions     Pending Prescriptions Disp Refills    lisinopril (PRINIVIL;ZESTRIL) 5 MG tablet 90 tablet 1     Sig: TAKE 1 TABLET BY MOUTH EVERY DAY     Last Filled:  5/10/2024 not due until November     Last appt: 6/5/2024   Next appt: Visit date not found    Last OARRS:        No data to display

## 2024-11-26 DIAGNOSIS — E11.40 TYPE 2 DIABETES MELLITUS WITH DIABETIC NEUROPATHY, WITHOUT LONG-TERM CURRENT USE OF INSULIN (HCC): ICD-10-CM

## 2024-11-26 DIAGNOSIS — E78.2 HYPERLIPIDEMIA, MIXED: ICD-10-CM

## 2024-11-26 RX ORDER — ROSUVASTATIN CALCIUM 20 MG/1
20 TABLET, COATED ORAL NIGHTLY
Qty: 90 TABLET | Refills: 0 | Status: SHIPPED | OUTPATIENT
Start: 2024-11-26

## 2024-11-26 NOTE — TELEPHONE ENCOUNTER
Last appointment: 6/5/2024  Next appointment: Visit date not found  Last refill: 2/26/24  Requested Prescriptions     Pending Prescriptions Disp Refills    rosuvastatin (CRESTOR) 20 MG tablet [Pharmacy Med Name: ROSUVASTATIN CALCIUM 20 MG TAB] 90 tablet 1     Sig: TAKE 1 TABLET BY MOUTH EVERY DAY AT NIGHT     Sent [employer] message to schedule due/overdue appointment.

## 2025-02-13 ENCOUNTER — OFFICE VISIT (OUTPATIENT)
Dept: ORTHOPEDIC SURGERY | Age: 55
End: 2025-02-13

## 2025-02-13 VITALS — HEIGHT: 73 IN | WEIGHT: 218 LBS | BODY MASS INDEX: 28.89 KG/M2

## 2025-02-13 DIAGNOSIS — M22.42 CHONDROMALACIA OF BOTH PATELLAE: ICD-10-CM

## 2025-02-13 DIAGNOSIS — M17.0 PRIMARY OSTEOARTHRITIS OF BOTH KNEES: Primary | ICD-10-CM

## 2025-02-13 DIAGNOSIS — M22.41 CHONDROMALACIA OF BOTH PATELLAE: ICD-10-CM

## 2025-02-13 RX ORDER — METHYLPREDNISOLONE 4 MG/1
TABLET ORAL
Qty: 21 KIT | Refills: 0 | Status: SHIPPED | OUTPATIENT
Start: 2025-02-13

## 2025-02-13 NOTE — PROGRESS NOTES
[facility] [place], [place] [address]           Patient Name: Enrique Pulliam   Case ID: [id number]   Patient : 1970   Referring Physician: Ge Long MD   Exam Date: 2021   Exam Description: MR Right Knee w/o Contrast            HISTORY:  Evaluate weightbearing osteoarthritis. Previously documented grade 3-4 chondromalacia    of patella.  Evaluate for medial meniscus tear.       TECHNICAL FACTORS:  Long- and short-axis fat- and water-weighted images were performed.       COMPARISON:  None.       FINDINGS:  ACL, PCL, LCL, MCL, flexor mechanism, and extensor mechanism are intact.       Thickened MCL.  Sprain, scarring and capsulitis present.       No fracture, AVN, or mass demonstrated.       Patellar and quadriceps tendons are intact.  Mild patellar and quadriceps tendon thickening at    the patellar attachment.  Traction related bony hypertrophy present.       Inflammation present within Hoffa's fat.  Traction related bony hypertrophy involves the    anterior tibial tubercle.       CONCLUSION:   1. Intermediate-grade to high-grade patellofemoral compartment chondromalacia.   2. Thickened MCL.  Chronic sprain, scarring and capsulitis present.   3. No meniscal or cruciate tear.   4. No fracture, AVN or mass.   5. Distal quadriceps and proximal patellar tendon tendinopathy at their attachment to the    patella.  No tear.       Thank you for the opportunity to provide your interpretation.               Ronni Cerda MD       A: YOBANY/ 2021 10:00 PM   T:  2021 7:59 PM       We did do updated bilateral knee AP and PA weightbearing sunrise and lateral films in the office on 2021 which does show stable appearance to his underlying patellofemoral arthropathy with mild to moderate narrowing of his medial compartments.    Assessment & Plan:    Encounter Diagnoses   Name Primary?    Primary osteoarthritis of both knees Yes    Chondromalacia of both patellae

## 2025-02-25 ENCOUNTER — TELEPHONE (OUTPATIENT)
Dept: ORTHOPEDIC SURGERY | Age: 55
End: 2025-02-25

## 2025-02-25 NOTE — TELEPHONE ENCOUNTER
SPOKE TO PATIENT AND LET HIM KNOW THAT CORTISONE AND EUFLEXXA BOTH APPROVED THROUGH [facility]. SCHEDULED APPT FOR CORTISONE, WILL SCHEDULE EUFLEXXA WHEN HE COMES IN FOR CORTISONE INJECTION.

## 2025-03-09 DIAGNOSIS — E11.9 CONTROLLED TYPE 2 DIABETES MELLITUS WITHOUT COMPLICATION, WITHOUT LONG-TERM CURRENT USE OF INSULIN (HCC): ICD-10-CM

## 2025-03-10 RX ORDER — SITAGLIPTIN AND METFORMIN HYDROCHLORIDE 1000; 50 MG/1; MG/1
1 TABLET, FILM COATED ORAL 2 TIMES DAILY WITH MEALS
Qty: 60 TABLET | Refills: 0 | OUTPATIENT
Start: 2025-03-10

## 2025-03-11 ENCOUNTER — OFFICE VISIT (OUTPATIENT)
Dept: ORTHOPEDIC SURGERY | Age: 55
End: 2025-03-11

## 2025-03-11 DIAGNOSIS — M25.562 CHRONIC PAIN OF BOTH KNEES: ICD-10-CM

## 2025-03-11 DIAGNOSIS — M17.0 PRIMARY OSTEOARTHRITIS OF BOTH KNEES: Primary | ICD-10-CM

## 2025-03-11 DIAGNOSIS — M22.41 CHONDROMALACIA OF BOTH PATELLAE: ICD-10-CM

## 2025-03-11 DIAGNOSIS — G89.29 CHRONIC PAIN OF BOTH KNEES: ICD-10-CM

## 2025-03-11 DIAGNOSIS — M22.42 CHONDROMALACIA OF BOTH PATELLAE: ICD-10-CM

## 2025-03-11 DIAGNOSIS — M25.561 CHRONIC PAIN OF BOTH KNEES: ICD-10-CM

## 2025-03-11 RX ORDER — BETAMETHASONE SODIUM PHOSPHATE AND BETAMETHASONE ACETATE 3; 3 MG/ML; MG/ML
24 INJECTION, SUSPENSION INTRA-ARTICULAR; INTRALESIONAL; INTRAMUSCULAR; SOFT TISSUE ONCE
Status: COMPLETED | OUTPATIENT
Start: 2025-03-11 | End: 2025-03-11

## 2025-03-11 RX ORDER — BUPIVACAINE HYDROCHLORIDE 2.5 MG/ML
4 INJECTION, SOLUTION INFILTRATION; PERINEURAL ONCE
Status: COMPLETED | OUTPATIENT
Start: 2025-03-11 | End: 2025-03-11

## 2025-03-11 RX ADMIN — BUPIVACAINE HYDROCHLORIDE 10 MG: 2.5 INJECTION, SOLUTION INFILTRATION; PERINEURAL at 13:50

## 2025-03-11 RX ADMIN — BETAMETHASONE SODIUM PHOSPHATE AND BETAMETHASONE ACETATE 24 MG: 3; 3 INJECTION, SUSPENSION INTRA-ARTICULAR; INTRALESIONAL; INTRAMUSCULAR; SOFT TISSUE at 13:49

## 2025-03-11 RX ADMIN — Medication 2 ML: at 13:52

## 2025-03-11 NOTE — PROGRESS NOTES
History Form dated on 12/17/2019 and available in the patient's chart under the Media tab.       Vital Signs     There were no vitals taken for this visit.      General Exam:   Constitutional: Patient is adequately groomed with no evidence of malnutrition  DTRs: Deep tendon reflexes are intact  Mental Status: The patient is oriented to time, place and person.  The patient's mood and affect are appropriate.  Lymphatic: The lymphatic examination bilaterally reveals all areas to be without enlargement or induration.  Vascular: Examination reveals no swelling or calf tenderness.  Peripheral pulses are palpable and 2+.  Neurological: The patient has good coordination.  There is no weakness or sensory deficit.      Knee Examination  Inspection:  He is in slight varus and has trace  effusions bilaterally.  There is marked patellofemoral crepitation.  No high-grade malalignment.     Palpation: He has mild to moderate recurrent tenderness to palpation over the medial joint line and also over the retropatellar joint.  He does have a mildly to moderately positive grind testing.  This is certainly more prominent than last visit.  He is most tender along the anterior 3rd medial joint lines.  He rates this at about of 5-6 out of 10.     Rang of Motion:  Range of motion -5-120 bilaterally.     Strength:  Strength testing 4+ out of 5 knee flexion extension.     Special Tests:  He is a mildly to moderately positive grind test and pain with medial Helen's.  I do not feel high-grade click but certainly this worsens his pain bilaterally.  This is remarkable for crepitation than actual pop or click.  No evidence of instability.     Skin: There are no rashes, ulcerations or lesions. Distal neurovascular exam is intact.     Gait: Improved gait.     Reflex reflexes are preserved her     Additional Comments:      Additional Examinations:  Right Lower Extremity: Examination of the right lower extremity does not show any tenderness,

## 2025-03-18 ENCOUNTER — OFFICE VISIT (OUTPATIENT)
Dept: ORTHOPEDIC SURGERY | Age: 55
End: 2025-03-18

## 2025-03-18 VITALS — HEIGHT: 73 IN | BODY MASS INDEX: 28.89 KG/M2 | WEIGHT: 218 LBS

## 2025-03-18 DIAGNOSIS — M17.0 PRIMARY OSTEOARTHRITIS OF BOTH KNEES: Primary | ICD-10-CM

## 2025-03-18 DIAGNOSIS — M25.561 CHRONIC PAIN OF BOTH KNEES: ICD-10-CM

## 2025-03-18 DIAGNOSIS — G89.29 CHRONIC PAIN OF BOTH KNEES: ICD-10-CM

## 2025-03-18 DIAGNOSIS — M22.41 CHONDROMALACIA OF BOTH PATELLAE: ICD-10-CM

## 2025-03-18 DIAGNOSIS — M22.42 CHONDROMALACIA OF BOTH PATELLAE: ICD-10-CM

## 2025-03-18 DIAGNOSIS — M25.562 CHRONIC PAIN OF BOTH KNEES: ICD-10-CM

## 2025-03-18 DIAGNOSIS — E11.9 CONTROLLED TYPE 2 DIABETES MELLITUS WITHOUT COMPLICATION, WITHOUT LONG-TERM CURRENT USE OF INSULIN (HCC): ICD-10-CM

## 2025-03-18 RX ORDER — SITAGLIPTIN AND METFORMIN HYDROCHLORIDE 1000; 50 MG/1; MG/1
1 TABLET, FILM COATED ORAL 2 TIMES DAILY WITH MEALS
Qty: 180 TABLET | Refills: 1 | OUTPATIENT
Start: 2025-03-18

## 2025-03-18 NOTE — PROGRESS NOTES
Chief Complaint  Knee Pain (FU JUDSON knee )      followup recurrent bilateral knee pain with substantial patellofemoral arthropathy with underlying knee osteoarthritis with mild to moderate recurrent synovitis is post completion of the most recent round of viscosupplementation.  8/27/2024 using Euflexxa bilateral    Enrique Pulliam is a 55 y.o. 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  He is being seen back today for follow-up on his bilateral knee symptomatic chondromalacia patella with underlying knee osteoarthritis.  He last completed his Visco supplementation on 8/27/2024 which did provide very good pain relief for him but over the last few weeks with the weather change he has become a little bit more sore primarily anteriorly involving his knees..      History of Present Illness for Follow Up Patient:      Enrique is being seen in follow up today for [facility] claim for chronic bilateral knee pain. The patient rates their current pain as a  5-6  out of 10 on the pain scale. Activities aggravating current symptoms include walking long durations and after sitting for long periods of time.  Activities relieving current symptoms include rest. Conservative treatment to date includes: rest, ice, NSAID X2 100 mg daily, knee bracing, home exercises, cortisone injections and Euflexxa most recently completed on 5/21/2019.   Denies locking catching or true instability symptoms.  He has continue with his exercise program does utilize his knee braces periodically.  He is continued to lose weight and has lost about 50 pounds.  Denies locking catching or instability symptoms.    Enrique was last seen in the office on 1/24/2023 for his bilateral knee osteoarthritis and patellofemoral arthropathy.  He has had progression of his knee pain symptoms which not range between a 4-8 out of 10.  He is currently off work for his ankle

## 2025-03-19 DIAGNOSIS — E11.9 CONTROLLED TYPE 2 DIABETES MELLITUS WITHOUT COMPLICATION, WITHOUT LONG-TERM CURRENT USE OF INSULIN (HCC): ICD-10-CM

## 2025-03-20 RX ORDER — SITAGLIPTIN AND METFORMIN HYDROCHLORIDE 1000; 50 MG/1; MG/1
1 TABLET, FILM COATED ORAL 2 TIMES DAILY WITH MEALS
Qty: 180 TABLET | Refills: 1 | OUTPATIENT
Start: 2025-03-20

## 2025-03-21 ENCOUNTER — PATIENT MESSAGE (OUTPATIENT)
Dept: INTERNAL MEDICINE CLINIC | Age: 55
End: 2025-03-21

## 2025-03-21 DIAGNOSIS — E11.9 CONTROLLED TYPE 2 DIABETES MELLITUS WITHOUT COMPLICATION, WITHOUT LONG-TERM CURRENT USE OF INSULIN (HCC): ICD-10-CM

## 2025-03-21 DIAGNOSIS — E11.9 CONTROLLED TYPE 2 DIABETES MELLITUS WITHOUT COMPLICATION, WITHOUT LONG-TERM CURRENT USE OF INSULIN: ICD-10-CM

## 2025-03-21 RX ORDER — SITAGLIPTIN AND METFORMIN HYDROCHLORIDE 1000; 50 MG/1; MG/1
TABLET, FILM COATED ORAL
Qty: 60 TABLET | Refills: 0 | Status: SHIPPED | OUTPATIENT
Start: 2025-03-21

## 2025-03-21 NOTE — TELEPHONE ENCOUNTER
Refills of both medications have already been teed up and sent to provider.     He will address them there.

## 2025-03-25 ENCOUNTER — OFFICE VISIT (OUTPATIENT)
Dept: ORTHOPEDIC SURGERY | Age: 55
End: 2025-03-25

## 2025-03-25 ENCOUNTER — TELEPHONE (OUTPATIENT)
Dept: INTERNAL MEDICINE CLINIC | Age: 55
End: 2025-03-25

## 2025-03-25 ENCOUNTER — PATIENT MESSAGE (OUTPATIENT)
Dept: INTERNAL MEDICINE CLINIC | Age: 55
End: 2025-03-25

## 2025-03-25 DIAGNOSIS — G89.29 CHRONIC PAIN OF BOTH KNEES: ICD-10-CM

## 2025-03-25 DIAGNOSIS — M25.562 CHRONIC PAIN OF LEFT KNEE: ICD-10-CM

## 2025-03-25 DIAGNOSIS — M25.562 CHRONIC PAIN OF BOTH KNEES: ICD-10-CM

## 2025-03-25 DIAGNOSIS — G89.29 CHRONIC PAIN OF RIGHT KNEE: ICD-10-CM

## 2025-03-25 DIAGNOSIS — M25.561 CHRONIC PAIN OF BOTH KNEES: ICD-10-CM

## 2025-03-25 DIAGNOSIS — M17.0 PRIMARY OSTEOARTHRITIS OF BOTH KNEES: Primary | ICD-10-CM

## 2025-03-25 DIAGNOSIS — M22.42 CHONDROMALACIA OF BOTH PATELLAE: ICD-10-CM

## 2025-03-25 DIAGNOSIS — M22.41 CHONDROMALACIA OF BOTH PATELLAE: ICD-10-CM

## 2025-03-25 DIAGNOSIS — M25.561 CHRONIC PAIN OF RIGHT KNEE: ICD-10-CM

## 2025-03-25 DIAGNOSIS — G89.29 CHRONIC PAIN OF LEFT KNEE: ICD-10-CM

## 2025-03-25 NOTE — TELEPHONE ENCOUNTER
Impossible to know what is going on.  What are his sugars?  If your sugars are 250-300 or higher, the frequent urination is likely from that.  He needs to be on his medication.  He has not followed up

## 2025-03-25 NOTE — TELEPHONE ENCOUNTER
Patient is calling in for  in regards to being at the pharmacy and was told his insurance will not cover janumet so he is needing a verbal to have medication changed to a generic brand.     Please call patient and pharmacy to advise.

## 2025-03-25 NOTE — PROGRESS NOTES
CC:  FU Knee Osteoarthritis with Viscosupplementation      Knee Pain     followup recurrent bilateral knee pain with substantial patellofemoral arthropathy with underlying knee osteoarthritis with mild to moderate recurrent synovitis is post completion of the most recent round of viscosupplementation 3/21/2023 using Euflexxa bilateral    Ernique Pulliam is a 52 y.o. 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  He is being seen back today for follow-up on his bilateral knee symptomatic chondromalacia patella with underlying knee osteoarthritis.  He last completed his Visco supplementation on 3/21/2023 which did provide very good pain relief for him but over the last few weeks with the weather change he has become a little bit more sore primarily anteriorly involving his knees..      History of Present Illness for Follow Up Patient:      Enrique is being seen in follow up today for [facility] claim for chronic bilateral knee pain. The patient rates their current pain as a 5-6 out of 10 on the pain scale. Activities aggravating current symptoms include walking long durations and after sitting for long periods of time.  Activities relieving current symptoms include rest. Conservative treatment to date includes: rest, ice, NSAID X2 100 mg daily, knee bracing, home exercises, cortisone injections and Euflexxa most recently completed on 5/21/2019.   Denies locking catching or true instability symptoms.  He has continue with his exercise program does utilize his knee braces periodically.  He is continued to lose weight and has lost about 50 pounds.  Denies locking catching or instability symptoms.    With I saw Enrique in the office on 2/1/2022 when we completed his Euflexxa bilaterally for his underlying knee osteoarthritis and chondromalacia patella.  He did reasonably well throughout the remainder of [place] in early spring but over the last several

## 2025-03-26 DIAGNOSIS — E11.9 TYPE 2 DIABETES MELLITUS WITHOUT COMPLICATION, WITHOUT LONG-TERM CURRENT USE OF INSULIN: Primary | ICD-10-CM

## 2025-03-26 DIAGNOSIS — E11.9 CONTROLLED TYPE 2 DIABETES MELLITUS WITHOUT COMPLICATION, WITHOUT LONG-TERM CURRENT USE OF INSULIN: ICD-10-CM

## 2025-03-26 DIAGNOSIS — E11.65 TYPE 2 DIABETES MELLITUS WITH HYPERGLYCEMIA, WITHOUT LONG-TERM CURRENT USE OF INSULIN (HCC): ICD-10-CM

## 2025-03-26 RX ORDER — SAXAGLIPTIN AND METFORMIN HYDROCHLORIDE 1000; 2.5 MG/1; MG/1
1 TABLET, FILM COATED, EXTENDED RELEASE ORAL 2 TIMES DAILY
Qty: 180 TABLET | Refills: 3 | Status: SHIPPED | OUTPATIENT
Start: 2025-03-26

## 2025-03-26 RX ORDER — SITAGLIPTIN AND METFORMIN HYDROCHLORIDE 1000; 50 MG/1; MG/1
TABLET, FILM COATED ORAL
Refills: 0 | OUTPATIENT
Start: 2025-03-26

## 2025-03-26 NOTE — TELEPHONE ENCOUNTER
Submitted PA for Janumet 50-1000MG tablets   [facility]ia [facility] Key: [id number] STATUS: PENDING.    Follow up done daily; if no decision with in three days we will refax.  If another three days goes by with no decision will call the insurance for status.

## 2025-03-26 NOTE — TELEPHONE ENCOUNTER
Pharmacy comment: Alternative Requested:THE PRESCRIBED MEDICATION IS NOT COVERED BY INSURANCE. PLEASE CONSIDER CHANGING TO ONE OF THE SUGGESTED COVERED ALTERNATIVES.       This is a duplicate request. Closing this encounter .

## 2025-03-26 NOTE — TELEPHONE ENCOUNTER
Per alternate encounter pa was submitted for the janumet today at 1:38 pm     There is not a generic available for this medication.   If pa is denied, there would need to be an alternate therapy ordered.

## 2025-03-26 NOTE — TELEPHONE ENCOUNTER
Called pharmacy, was told that this medication does not have a generic on the market yet.   And his insurance will not cover it at all.     I did research this on the  site, and the suggested go live for a generic is not until may 2026    Do you have a preferred alternate therapy to try?

## 2025-03-26 NOTE — TELEPHONE ENCOUNTER
Denial letter recommended preferred drug saxagliptin-metformin.  This was called into the pharmacy for 90-day supply with refills.  Dose is 2.5 mg - 1000 mg tab

## 2025-04-01 ENCOUNTER — OFFICE VISIT (OUTPATIENT)
Dept: ORTHOPEDIC SURGERY | Age: 55
End: 2025-04-01

## 2025-04-01 DIAGNOSIS — G89.29 CHRONIC PAIN OF LEFT KNEE: ICD-10-CM

## 2025-04-01 DIAGNOSIS — E78.2 HYPERLIPIDEMIA, MIXED: ICD-10-CM

## 2025-04-01 DIAGNOSIS — E11.40 TYPE 2 DIABETES MELLITUS WITH DIABETIC NEUROPATHY, WITHOUT LONG-TERM CURRENT USE OF INSULIN (HCC): ICD-10-CM

## 2025-04-01 DIAGNOSIS — G89.29 CHRONIC PAIN OF RIGHT KNEE: ICD-10-CM

## 2025-04-01 DIAGNOSIS — G89.29 CHRONIC PAIN OF BOTH KNEES: ICD-10-CM

## 2025-04-01 DIAGNOSIS — M22.42 CHONDROMALACIA OF BOTH PATELLAE: ICD-10-CM

## 2025-04-01 DIAGNOSIS — M25.562 CHRONIC PAIN OF BOTH KNEES: ICD-10-CM

## 2025-04-01 DIAGNOSIS — M22.41 CHONDROMALACIA OF BOTH PATELLAE: ICD-10-CM

## 2025-04-01 DIAGNOSIS — M17.0 PRIMARY OSTEOARTHRITIS OF BOTH KNEES: Primary | ICD-10-CM

## 2025-04-01 DIAGNOSIS — M25.561 CHRONIC PAIN OF BOTH KNEES: ICD-10-CM

## 2025-04-01 DIAGNOSIS — M25.562 CHRONIC PAIN OF LEFT KNEE: ICD-10-CM

## 2025-04-01 DIAGNOSIS — M25.561 CHRONIC PAIN OF RIGHT KNEE: ICD-10-CM

## 2025-04-01 RX ORDER — ROSUVASTATIN CALCIUM 20 MG/1
20 TABLET, COATED ORAL NIGHTLY
Qty: 90 TABLET | Refills: 0 | Status: SHIPPED | OUTPATIENT
Start: 2025-04-01

## 2025-04-01 NOTE — PROGRESS NOTES
CC:  FU Knee Osteoarthritis with Viscosupplementation      Knee Pain     followup recurrent bilateral knee pain with substantial patellofemoral arthropathy with underlying knee osteoarthritis with mild to moderate recurrent synovitis is post completion of the most recent round of viscosupplementation 3/21/2023 using Euflexxa bilateral    Enrique Pulliam is a 52 y.o. 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  He is being seen back today for follow-up on his bilateral knee symptomatic chondromalacia patella with underlying knee osteoarthritis.  He last completed his Visco supplementation on 3/21/2023 which did provide very good pain relief for him but over the last few weeks with the weather change he has become a little bit more sore primarily anteriorly involving his knees..      History of Present Illness for Follow Up Patient:      Enrique is being seen in follow up today for [facility] claim for chronic bilateral knee pain. The patient rates their current pain as a 5-6 out of 10 on the pain scale. Activities aggravating current symptoms include walking long durations and after sitting for long periods of time.  Activities relieving current symptoms include rest. Conservative treatment to date includes: rest, ice, NSAID X2 100 mg daily, knee bracing, home exercises, cortisone injections and Euflexxa most recently completed on 5/21/2019.   Denies locking catching or true instability symptoms.  He has continue with his exercise program does utilize his knee braces periodically.  He is continued to lose weight and has lost about 50 pounds.  Denies locking catching or instability symptoms.    With I saw Enrique in the office on 2/1/2022 when we completed his Euflexxa bilaterally for his underlying knee osteoarthritis and chondromalacia patella.  He did reasonably well throughout the remainder of [place] in early spring but over the last several

## 2025-04-02 ENCOUNTER — RESULTS FOLLOW-UP (OUTPATIENT)
Dept: INTERNAL MEDICINE CLINIC | Age: 55
End: 2025-04-02

## 2025-04-02 ENCOUNTER — OFFICE VISIT (OUTPATIENT)
Dept: INTERNAL MEDICINE CLINIC | Age: 55
End: 2025-04-02

## 2025-04-02 VITALS
SYSTOLIC BLOOD PRESSURE: 118 MMHG | DIASTOLIC BLOOD PRESSURE: 72 MMHG | TEMPERATURE: 97.3 F | OXYGEN SATURATION: 96 % | WEIGHT: 210 LBS | BODY MASS INDEX: 27.71 KG/M2 | HEART RATE: 77 BPM

## 2025-04-02 DIAGNOSIS — E11.65 TYPE 2 DIABETES MELLITUS WITH HYPERGLYCEMIA, WITHOUT LONG-TERM CURRENT USE OF INSULIN (HCC): ICD-10-CM

## 2025-04-02 DIAGNOSIS — E11.40 TYPE 2 DIABETES MELLITUS WITH DIABETIC NEUROPATHY, WITHOUT LONG-TERM CURRENT USE OF INSULIN (HCC): ICD-10-CM

## 2025-04-02 DIAGNOSIS — K75.81 NASH (NONALCOHOLIC STEATOHEPATITIS): ICD-10-CM

## 2025-04-02 DIAGNOSIS — I10 BENIGN ESSENTIAL HTN: ICD-10-CM

## 2025-04-02 DIAGNOSIS — R35.0 URINARY FREQUENCY: Primary | ICD-10-CM

## 2025-04-02 DIAGNOSIS — Z86.0100 HISTORY OF COLON POLYPS: ICD-10-CM

## 2025-04-02 DIAGNOSIS — M10.9 GOUT, UNSPECIFIED CAUSE, UNSPECIFIED CHRONICITY, UNSPECIFIED SITE: ICD-10-CM

## 2025-04-02 DIAGNOSIS — E78.2 HYPERLIPIDEMIA, MIXED: ICD-10-CM

## 2025-04-02 DIAGNOSIS — Z12.5 SCREENING FOR PROSTATE CANCER: ICD-10-CM

## 2025-04-02 DIAGNOSIS — Z12.11 SCREEN FOR COLON CANCER: ICD-10-CM

## 2025-04-02 PROBLEM — A41.9 SEVERE SEPSIS: Status: RESOLVED | Noted: 2024-05-31 | Resolved: 2025-04-02

## 2025-04-02 PROBLEM — M54.42 LEFT-SIDED LOW BACK PAIN WITH LEFT-SIDED SCIATICA: Status: RESOLVED | Noted: 2018-11-30 | Resolved: 2025-04-02

## 2025-04-02 PROBLEM — R65.20 SEVERE SEPSIS: Status: RESOLVED | Noted: 2024-05-31 | Resolved: 2025-04-02

## 2025-04-02 LAB
BACTERIA URNS QL MICRO: ABNORMAL /HPF
BILIRUB UR QL STRIP.AUTO: NEGATIVE
CHP ED QC CHECK: NORMAL
CLARITY UR: CLEAR
COLOR UR: YELLOW
CREAT UR-MCNC: 45.9 MG/DL (ref 39–259)
EPI CELLS #/AREA URNS AUTO: 0 /HPF (ref 0–5)
GLUCOSE BLD-MCNC: 228 MG/DL
GLUCOSE UR STRIP.AUTO-MCNC: >=1000 MG/DL
HGB UR QL STRIP.AUTO: NEGATIVE
HYALINE CASTS #/AREA URNS AUTO: 0 /LPF (ref 0–8)
KETONES UR STRIP.AUTO-MCNC: NEGATIVE MG/DL
LEUKOCYTE ESTERASE UR QL STRIP.AUTO: NEGATIVE
MICROALBUMIN UR DL<=1MG/L-MCNC: <1.2 MG/DL
MICROALBUMIN/CREAT UR: NORMAL MG/G (ref 0–30)
NITRITE UR QL STRIP.AUTO: NEGATIVE
PH UR STRIP.AUTO: 6 [PH] (ref 5–8)
PROT UR STRIP.AUTO-MCNC: NEGATIVE MG/DL
RBC CLUMPS #/AREA URNS AUTO: 0 /HPF (ref 0–4)
SP GR UR STRIP.AUTO: 1.04 (ref 1–1.03)
UA DIPSTICK W REFLEX MICRO PNL UR: ABNORMAL
URN SPEC COLLECT METH UR: ABNORMAL
UROBILINOGEN UR STRIP-ACNC: 0.2 E.U./DL
WBC #/AREA URNS AUTO: 0 /HPF (ref 0–5)

## 2025-04-02 RX ORDER — SAXAGLIPTIN AND METFORMIN HYDROCHLORIDE 1000; 2.5 MG/1; MG/1
1 TABLET, FILM COATED, EXTENDED RELEASE ORAL 2 TIMES DAILY
Qty: 180 TABLET | Refills: 3 | Status: SHIPPED | OUTPATIENT
Start: 2025-04-02

## 2025-04-02 SDOH — ECONOMIC STABILITY: FOOD INSECURITY: WITHIN THE PAST 12 MONTHS, THE FOOD YOU BOUGHT JUST DIDN'T LAST AND YOU DIDN'T HAVE MONEY TO GET MORE.: NEVER TRUE

## 2025-04-02 ASSESSMENT — PATIENT HEALTH QUESTIONNAIRE - PHQ9
SUM OF ALL RESPONSES TO PHQ QUESTIONS 1-9: 0
SUM OF ALL RESPONSES TO PHQ QUESTIONS 1-9: 0
2. FEELING DOWN, DEPRESSED OR HOPELESS: NOT AT ALL
SUM OF ALL RESPONSES TO PHQ QUESTIONS 1-9: 0
1. LITTLE INTEREST OR PLEASURE IN DOING THINGS: NOT AT ALL
SUM OF ALL RESPONSES TO PHQ QUESTIONS 1-9: 0

## 2025-04-02 NOTE — PROGRESS NOTES
[facility]  Internal Medicine  Patient Encounter  Anibal Murcia D.O., [facility]         Chief Complaint   Patient presents with    Check-Up         HPI: 55 y.o. male who has been noncompliant with routine follow-up seen today requesting a checkup regarding status of current issues as below with a medication review and reconciliation.  He was last seen for a checkup in February 2024.  He was seen for a hospital follow-up on 6/5/2024.    He does report a slow stream, occasional nocturia.  He does not want to address.      Health maintenance items overdue:  Colorectal cancer screening  Diabetic foot exam  Urine microalbuminuria test  COVID-19 vaccine  Lipid profile  Diabetic retinal eye exam  Depression screen  Shingles vaccine    Diabetes Mellitus Type II, Follow-up--   Lab Results   Component Value Date    LABA1C 7.0 06/02/2024     Lab Results   Component Value Date    .2 06/02/2024      Previous history---->  his A1c in October 2017 was 9.0%.  His highest A1c was in 2014 at 11.3%.    A1c had worsened from 3/10/2021 to 9/28/2022.  His A1c back in March 2021 was 5.7%.  Patient has a history of intermittently poorly controlled diabetes.  His blood sugars can increase into the 300s.  He will then develop polyuria and polydipsia.  He has had the symptoms recently due to running out of his medication.  He was also getting steroid knee injection.     Sugars-- 2 weeks his sugars reached 200's-568.    He states he ran out of 51fanli.  Insurance won't cover. Saxagliptin-Met sent in instead.  He states this is helping.  Pt is C/O increased urinary frequency for the last 3 weeks.  Not improving with decreasing blood sugars.    He states he lost 10#.    Sugars NOW-- AM--150's-180's.    U.Microalbumin/Cr--8/10/2023, overdue  Last Eye exam--3/19/2024  + ACEi-- Lisinopril  Complications-- None.     Insulin Treated? No.  + ASA  + Statin  Last LDL--27  Foot exam-8/10/42900, overdue    Hyperlipidemia:    Lab Results

## 2025-04-02 NOTE — PATIENT INSTRUCTIONS
To Do List:    #1 Schedule repeat eye exam.  Need every year    #2 recommend checking blood sugars 3 times a day (before each meal).  Report these readings in 2 weeks    #3 continue efforts with Lifestyle modification including low calorie diet focusing on Low fat/low cholesterol and low carbohydrate intake, along with  increasing cardiovascular (aerobic) exercise.

## 2025-04-03 LAB — BACTERIA UR CULT: NORMAL

## 2025-04-09 ENCOUNTER — HOSPITAL ENCOUNTER (OUTPATIENT)
Age: 55
Discharge: HOME | End: 2025-04-09
Payer: OTHER GOVERNMENT

## 2025-04-09 DIAGNOSIS — K75.81 NASH (NONALCOHOLIC STEATOHEPATITIS): ICD-10-CM

## 2025-04-09 DIAGNOSIS — I10 BENIGN ESSENTIAL HTN: ICD-10-CM

## 2025-04-09 DIAGNOSIS — E78.2 HYPERLIPIDEMIA, MIXED: ICD-10-CM

## 2025-04-09 DIAGNOSIS — E11.40 TYPE 2 DIABETES MELLITUS WITH DIABETIC NEUROPATHY, WITHOUT LONG-TERM CURRENT USE OF INSULIN (HCC): ICD-10-CM

## 2025-04-09 DIAGNOSIS — E11.65 TYPE 2 DIABETES MELLITUS WITH HYPERGLYCEMIA, WITHOUT LONG-TERM CURRENT USE OF INSULIN (HCC): ICD-10-CM

## 2025-04-09 DIAGNOSIS — Z12.5 SCREENING FOR PROSTATE CANCER: ICD-10-CM

## 2025-04-09 DIAGNOSIS — M10.9 GOUT, UNSPECIFIED CAUSE, UNSPECIFIED CHRONICITY, UNSPECIFIED SITE: ICD-10-CM

## 2025-04-09 LAB
ALBUMIN SERPL-MCNC: 4.2 G/DL (ref 3.4–5)
ALBUMIN/GLOB SERPL: 1.6 {RATIO} (ref 1.1–2.2)
ALP SERPL-CCNC: 92 U/L (ref 40–129)
ALT SERPL-CCNC: 37 U/L (ref 10–40)
ANION GAP SERPL CALCULATED.3IONS-SCNC: 11 MMOL/L (ref 3–16)
AST SERPL-CCNC: 29 U/L (ref 15–37)
BASOPHILS # BLD: 0 K/UL (ref 0–0.2)
BASOPHILS NFR BLD: 0.5 %
BILIRUB SERPL-MCNC: 0.4 MG/DL (ref 0–1)
BUN SERPL-MCNC: 13 MG/DL (ref 7–20)
CALCIUM SERPL-MCNC: 9.4 MG/DL (ref 8.3–10.6)
CHLORIDE SERPL-SCNC: 100 MMOL/L (ref 99–110)
CHOLEST SERPL-MCNC: 76 MG/DL (ref 0–199)
CO2 SERPL-SCNC: 28 MMOL/L (ref 21–32)
CREAT SERPL-MCNC: 0.9 MG/DL (ref 0.9–1.3)
DEPRECATED RDW RBC AUTO: 13.3 % (ref 12.4–15.4)
EOSINOPHIL # BLD: 0.1 K/UL (ref 0–0.6)
EOSINOPHIL NFR BLD: 1.7 %
EST. AVERAGE GLUCOSE BLD GHB EST-MCNC: 260.4 MG/DL
GFR SERPLBLD CREATININE-BSD FMLA CKD-EPI: >90 ML/MIN/{1.73_M2}
GLUCOSE SERPL-MCNC: 186 MG/DL (ref 70–99)
HBA1C MFR BLD: 10.7 %
HCT VFR BLD AUTO: 45.4 % (ref 40.5–52.5)
HDLC SERPL-MCNC: 29 MG/DL (ref 40–60)
HGB BLD-MCNC: 15.3 G/DL (ref 13.5–17.5)
LDLC SERPL CALC-MCNC: 28 MG/DL
LYMPHOCYTES # BLD: 2.1 K/UL (ref 1–5.1)
LYMPHOCYTES NFR BLD: 25.7 %
MCH RBC QN AUTO: 30.7 PG (ref 26–34)
MCHC RBC AUTO-ENTMCNC: 33.7 G/DL (ref 31–36)
MCV RBC AUTO: 91 FL (ref 80–100)
MONOCYTES # BLD: 0.5 K/UL (ref 0–1.3)
MONOCYTES NFR BLD: 5.9 %
NEUTROPHILS # BLD: 5.4 K/UL (ref 1.7–7.7)
NEUTROPHILS NFR BLD: 66.2 %
PLATELET # BLD AUTO: 154 K/UL (ref 135–450)
PMV BLD AUTO: 10.9 FL (ref 5–10.5)
POTASSIUM SERPL-SCNC: 4.4 MMOL/L (ref 3.5–5.1)
PROT SERPL-MCNC: 6.9 G/DL (ref 6.4–8.2)
PSA SERPL DL<=0.01 NG/ML-MCNC: 0.37 NG/ML (ref 0–4)
RBC # BLD AUTO: 4.99 M/UL (ref 4.2–5.9)
SODIUM SERPL-SCNC: 139 MMOL/L (ref 136–145)
TRIGL SERPL-MCNC: 93 MG/DL (ref 0–150)
TSH SERPL DL<=0.005 MIU/L-ACNC: 1.71 UIU/ML (ref 0.27–4.2)
URATE SERPL-MCNC: 4.2 MG/DL (ref 3.5–7.2)
VLDLC SERPL CALC-MCNC: 19 MG/DL
WBC # BLD AUTO: 8.1 K/UL (ref 4–11)

## 2025-04-09 PROCEDURE — 85025 COMPLETE CBC W/AUTO DIFF WBC: CPT

## 2025-04-09 PROCEDURE — 84550 ASSAY OF BLOOD/URIC ACID: CPT

## 2025-04-09 PROCEDURE — 80053 COMPREHEN METABOLIC PANEL: CPT

## 2025-04-09 PROCEDURE — 84153 ASSAY OF PSA TOTAL: CPT

## 2025-04-09 PROCEDURE — 83036 HEMOGLOBIN GLYCOSYLATED A1C: CPT

## 2025-04-09 PROCEDURE — 84443 ASSAY THYROID STIM HORMONE: CPT

## 2025-04-09 PROCEDURE — 36415 COLL VENOUS BLD VENIPUNCTURE: CPT

## 2025-04-09 PROCEDURE — 80061 LIPID PANEL: CPT

## 2025-04-10 ENCOUNTER — RESULTS FOLLOW-UP (OUTPATIENT)
Dept: INTERNAL MEDICINE CLINIC | Age: 55
End: 2025-04-10

## 2025-06-14 DIAGNOSIS — I10 BENIGN ESSENTIAL HTN: ICD-10-CM

## 2025-06-16 RX ORDER — LISINOPRIL 5 MG/1
5 TABLET ORAL DAILY
Qty: 90 TABLET | Refills: 1 | Status: SHIPPED | OUTPATIENT
Start: 2025-06-16

## 2025-06-16 NOTE — TELEPHONE ENCOUNTER
Last appointment: 4/2/2025  Next appointment: Visit date not found  Return in about 3 months (around 7/2/2025) for check up for chronic medical problems.      Sent [employer] message to schedule next appointment due in July.    Last refill: (5/10/2024) by Anibal Murcia DO

## 2025-06-25 DIAGNOSIS — E78.2 HYPERLIPIDEMIA, MIXED: ICD-10-CM

## 2025-06-25 DIAGNOSIS — E11.65 TYPE 2 DIABETES MELLITUS WITH HYPERGLYCEMIA, WITHOUT LONG-TERM CURRENT USE OF INSULIN (HCC): ICD-10-CM

## 2025-06-25 DIAGNOSIS — E11.40 TYPE 2 DIABETES MELLITUS WITH DIABETIC NEUROPATHY, WITHOUT LONG-TERM CURRENT USE OF INSULIN (HCC): ICD-10-CM

## 2025-06-25 DIAGNOSIS — E11.9 CONTROLLED TYPE 2 DIABETES MELLITUS WITHOUT COMPLICATION, WITHOUT LONG-TERM CURRENT USE OF INSULIN (HCC): ICD-10-CM

## 2025-06-25 RX ORDER — SITAGLIPTIN AND METFORMIN HYDROCHLORIDE 1000; 50 MG/1; MG/1
1 TABLET, FILM COATED ORAL 2 TIMES DAILY WITH MEALS
Qty: 180 TABLET | Refills: 1 | OUTPATIENT
Start: 2025-06-25

## 2025-06-25 RX ORDER — ROSUVASTATIN CALCIUM 20 MG/1
20 TABLET, COATED ORAL NIGHTLY
Qty: 90 TABLET | Refills: 0 | OUTPATIENT
Start: 2025-06-25

## 2025-06-25 NOTE — TELEPHONE ENCOUNTER
I called and spoke to patient and he confirmed he is not on Janumet and he also stated he does not need Rosuvastatin.    He stated he just needs sAXagliptin-metFORMIN ER 2.5-1000 MG TB24

## 2025-06-25 NOTE — TELEPHONE ENCOUNTER
Last appointment: 4/2/2025  Return in about 3 months (around 7/2/2025) for check up for chronic medical problems.  Next appointment: Visit date not found  Last refill:   Rosuvastatin: 04/01/2025  Janumet: looks to be discontinued: Please advise  Discontinued by: Anibal Murcia DO on 4/2/2025 08:56   Reason: Alternate therapy     Sent [employer] message to schedule next appointment due in July.

## 2025-06-25 NOTE — TELEPHONE ENCOUNTER
Anibal Murcia, DO to Me (Selected Message)      6/25/25  1:32 PM  I do not think patient is on Janumet.  He is on saxagliptin-metformin.  Please confirm

## 2025-06-26 RX ORDER — SAXAGLIPTIN AND METFORMIN HYDROCHLORIDE 1000; 2.5 MG/1; MG/1
1 TABLET, FILM COATED, EXTENDED RELEASE ORAL 2 TIMES DAILY
Qty: 180 TABLET | Refills: 1 | Status: SHIPPED | OUTPATIENT
Start: 2025-06-26

## 2025-08-01 DIAGNOSIS — E11.40 TYPE 2 DIABETES MELLITUS WITH DIABETIC NEUROPATHY, WITHOUT LONG-TERM CURRENT USE OF INSULIN (HCC): ICD-10-CM

## 2025-08-01 DIAGNOSIS — E78.2 HYPERLIPIDEMIA, MIXED: ICD-10-CM

## 2025-08-01 RX ORDER — ROSUVASTATIN CALCIUM 20 MG/1
20 TABLET, COATED ORAL NIGHTLY
Qty: 90 TABLET | Refills: 1 | Status: SHIPPED | OUTPATIENT
Start: 2025-08-01

## 2025-08-01 NOTE — TELEPHONE ENCOUNTER
Last appointment: 4/2/2025  Next appointment: 8/13/2025        Last refill: (4/1/2025) by Anibal Murcia DO

## 2025-08-13 ENCOUNTER — OFFICE VISIT (OUTPATIENT)
Dept: INTERNAL MEDICINE CLINIC | Age: 55
End: 2025-08-13
Payer: COMMERCIAL

## 2025-08-13 VITALS
WEIGHT: 219 LBS | DIASTOLIC BLOOD PRESSURE: 72 MMHG | SYSTOLIC BLOOD PRESSURE: 120 MMHG | TEMPERATURE: 97.3 F | OXYGEN SATURATION: 98 % | BODY MASS INDEX: 28.89 KG/M2 | HEART RATE: 61 BPM

## 2025-08-13 DIAGNOSIS — E11.40 TYPE 2 DIABETES MELLITUS WITH DIABETIC NEUROPATHY, WITHOUT LONG-TERM CURRENT USE OF INSULIN (HCC): Primary | ICD-10-CM

## 2025-08-13 DIAGNOSIS — E78.2 HYPERLIPIDEMIA, MIXED: ICD-10-CM

## 2025-08-13 DIAGNOSIS — N40.1 BENIGN PROSTATIC HYPERPLASIA WITH URINARY FREQUENCY: ICD-10-CM

## 2025-08-13 DIAGNOSIS — E11.65 TYPE 2 DIABETES MELLITUS WITH HYPERGLYCEMIA, WITHOUT LONG-TERM CURRENT USE OF INSULIN (HCC): ICD-10-CM

## 2025-08-13 DIAGNOSIS — R35.0 URINARY FREQUENCY: ICD-10-CM

## 2025-08-13 DIAGNOSIS — R35.0 BENIGN PROSTATIC HYPERPLASIA WITH URINARY FREQUENCY: ICD-10-CM

## 2025-08-13 DIAGNOSIS — I10 BENIGN ESSENTIAL HTN: ICD-10-CM

## 2025-08-13 DIAGNOSIS — K75.81 NASH (NONALCOHOLIC STEATOHEPATITIS): ICD-10-CM

## 2025-08-13 DIAGNOSIS — E11.40 TYPE 2 DIABETES MELLITUS WITH DIABETIC NEUROPATHY, WITHOUT LONG-TERM CURRENT USE OF INSULIN (HCC): ICD-10-CM

## 2025-08-13 DIAGNOSIS — M10.9 GOUT, UNSPECIFIED CAUSE, UNSPECIFIED CHRONICITY, UNSPECIFIED SITE: ICD-10-CM

## 2025-08-13 LAB
ALBUMIN SERPL-MCNC: 4.8 G/DL (ref 3.4–5)
ALBUMIN/GLOB SERPL: 1.8 {RATIO} (ref 1.1–2.2)
ALP SERPL-CCNC: 66 U/L (ref 40–129)
ALT SERPL-CCNC: 40 U/L (ref 10–40)
ANION GAP SERPL CALCULATED.3IONS-SCNC: 13 MMOL/L (ref 3–16)
AST SERPL-CCNC: 34 U/L (ref 15–37)
BASOPHILS # BLD: 0 K/UL (ref 0–0.2)
BASOPHILS NFR BLD: 0.4 %
BILIRUB SERPL-MCNC: 0.6 MG/DL (ref 0–1)
BUN SERPL-MCNC: 16 MG/DL (ref 7–20)
CALCIUM SERPL-MCNC: 9.7 MG/DL (ref 8.3–10.6)
CHLORIDE SERPL-SCNC: 101 MMOL/L (ref 99–110)
CHOLEST SERPL-MCNC: 91 MG/DL (ref 0–199)
CO2 SERPL-SCNC: 22 MMOL/L (ref 21–32)
CREAT SERPL-MCNC: 0.9 MG/DL (ref 0.9–1.3)
DEPRECATED RDW RBC AUTO: 12.8 % (ref 12.4–15.4)
EOSINOPHIL # BLD: 0.1 K/UL (ref 0–0.6)
EOSINOPHIL NFR BLD: 0.5 %
EST. AVERAGE GLUCOSE BLD GHB EST-MCNC: 177.2 MG/DL
GFR SERPLBLD CREATININE-BSD FMLA CKD-EPI: >90 ML/MIN/{1.73_M2}
GLUCOSE SERPL-MCNC: 136 MG/DL (ref 70–99)
HBA1C MFR BLD: 7.8 %
HCT VFR BLD AUTO: 47.6 % (ref 40.5–52.5)
HDLC SERPL-MCNC: 29 MG/DL (ref 40–60)
HGB BLD-MCNC: 16.5 G/DL (ref 13.5–17.5)
LDLC SERPL CALC-MCNC: 34 MG/DL
LYMPHOCYTES # BLD: 2.2 K/UL (ref 1–5.1)
LYMPHOCYTES NFR BLD: 20.1 %
MCH RBC QN AUTO: 31 PG (ref 26–34)
MCHC RBC AUTO-ENTMCNC: 34.6 G/DL (ref 31–36)
MCV RBC AUTO: 89.5 FL (ref 80–100)
MONOCYTES # BLD: 0.6 K/UL (ref 0–1.3)
MONOCYTES NFR BLD: 5.6 %
NEUTROPHILS # BLD: 8 K/UL (ref 1.7–7.7)
NEUTROPHILS NFR BLD: 73.4 %
PLATELET # BLD AUTO: 159 K/UL (ref 135–450)
PMV BLD AUTO: 11.4 FL (ref 5–10.5)
POTASSIUM SERPL-SCNC: 4.7 MMOL/L (ref 3.5–5.1)
PROT SERPL-MCNC: 7.5 G/DL (ref 6.4–8.2)
RBC # BLD AUTO: 5.32 M/UL (ref 4.2–5.9)
SODIUM SERPL-SCNC: 136 MMOL/L (ref 136–145)
TRIGL SERPL-MCNC: 142 MG/DL (ref 0–150)
VLDLC SERPL CALC-MCNC: 28 MG/DL
WBC # BLD AUTO: 10.9 K/UL (ref 4–11)

## 2025-08-13 PROCEDURE — 3046F HEMOGLOBIN A1C LEVEL >9.0%: CPT | Performed by: INTERNAL MEDICINE

## 2025-08-13 PROCEDURE — 3074F SYST BP LT 130 MM HG: CPT | Performed by: INTERNAL MEDICINE

## 2025-08-13 PROCEDURE — 3078F DIAST BP <80 MM HG: CPT | Performed by: INTERNAL MEDICINE

## 2025-08-13 PROCEDURE — 99214 OFFICE O/P EST MOD 30 MIN: CPT | Performed by: INTERNAL MEDICINE

## 2025-08-13 RX ORDER — TAMSULOSIN HYDROCHLORIDE 0.4 MG/1
0.4 CAPSULE ORAL DAILY
Qty: 90 CAPSULE | Refills: 1 | Status: SHIPPED | OUTPATIENT
Start: 2025-08-13

## 2025-08-13 SDOH — ECONOMIC STABILITY: FOOD INSECURITY: WITHIN THE PAST 12 MONTHS, THE FOOD YOU BOUGHT JUST DIDN'T LAST AND YOU DIDN'T HAVE MONEY TO GET MORE.: NEVER TRUE

## 2025-08-13 SDOH — ECONOMIC STABILITY: FOOD INSECURITY: WITHIN THE PAST 12 MONTHS, YOU WORRIED THAT YOUR FOOD WOULD RUN OUT BEFORE YOU GOT MONEY TO BUY MORE.: NEVER TRUE

## 2025-08-21 ENCOUNTER — OFFICE VISIT (OUTPATIENT)
Dept: ORTHOPEDIC SURGERY | Age: 55
End: 2025-08-21

## 2025-08-21 DIAGNOSIS — M17.0 PRIMARY OSTEOARTHRITIS OF BOTH KNEES: Primary | ICD-10-CM

## 2025-08-21 DIAGNOSIS — M22.42 CHONDROMALACIA OF BOTH PATELLAE: ICD-10-CM

## 2025-08-21 DIAGNOSIS — M25.562 CHRONIC PAIN OF BOTH KNEES: ICD-10-CM

## 2025-08-21 DIAGNOSIS — G89.29 CHRONIC PAIN OF BOTH KNEES: ICD-10-CM

## 2025-08-21 DIAGNOSIS — M25.561 CHRONIC PAIN OF BOTH KNEES: ICD-10-CM

## 2025-08-21 DIAGNOSIS — M22.41 CHONDROMALACIA OF BOTH PATELLAE: ICD-10-CM
